# Patient Record
Sex: MALE | Race: WHITE | NOT HISPANIC OR LATINO | URBAN - METROPOLITAN AREA
[De-identification: names, ages, dates, MRNs, and addresses within clinical notes are randomized per-mention and may not be internally consistent; named-entity substitution may affect disease eponyms.]

---

## 2019-04-30 ENCOUNTER — INPATIENT (INPATIENT)
Facility: HOSPITAL | Age: 74
LOS: 3 days | Discharge: HOME | End: 2019-05-04
Attending: HOSPITALIST | Admitting: HOSPITALIST
Payer: COMMERCIAL

## 2019-04-30 VITALS
DIASTOLIC BLOOD PRESSURE: 60 MMHG | TEMPERATURE: 100 F | RESPIRATION RATE: 18 BRPM | OXYGEN SATURATION: 99 % | HEART RATE: 78 BPM | SYSTOLIC BLOOD PRESSURE: 148 MMHG

## 2019-04-30 DIAGNOSIS — I96 GANGRENE, NOT ELSEWHERE CLASSIFIED: ICD-10-CM

## 2019-04-30 LAB
ALBUMIN SERPL ELPH-MCNC: 4.1 G/DL — SIGNIFICANT CHANGE UP (ref 3.3–5)
ALP SERPL-CCNC: 60 U/L — SIGNIFICANT CHANGE UP (ref 40–120)
ALT FLD-CCNC: 32 U/L — SIGNIFICANT CHANGE UP (ref 4–41)
ANION GAP SERPL CALC-SCNC: 14 MMO/L — SIGNIFICANT CHANGE UP (ref 7–14)
APTT BLD: 29.9 SEC — SIGNIFICANT CHANGE UP (ref 27.5–36.3)
AST SERPL-CCNC: 25 U/L — SIGNIFICANT CHANGE UP (ref 4–40)
BASE EXCESS BLDV CALC-SCNC: 4.3 MMOL/L — SIGNIFICANT CHANGE UP
BASOPHILS # BLD AUTO: 0.03 K/UL — SIGNIFICANT CHANGE UP (ref 0–0.2)
BASOPHILS NFR BLD AUTO: 0.4 % — SIGNIFICANT CHANGE UP (ref 0–2)
BILIRUB SERPL-MCNC: 0.6 MG/DL — SIGNIFICANT CHANGE UP (ref 0.2–1.2)
BLD GP AB SCN SERPL QL: NEGATIVE — SIGNIFICANT CHANGE UP
BLOOD GAS VENOUS - CREATININE: 0.7 MG/DL — SIGNIFICANT CHANGE UP (ref 0.5–1.3)
BUN SERPL-MCNC: 15 MG/DL — SIGNIFICANT CHANGE UP (ref 7–23)
CALCIUM SERPL-MCNC: 9.4 MG/DL — SIGNIFICANT CHANGE UP (ref 8.4–10.5)
CHLORIDE BLDV-SCNC: 103 MMOL/L — SIGNIFICANT CHANGE UP (ref 96–108)
CHLORIDE SERPL-SCNC: 99 MMOL/L — SIGNIFICANT CHANGE UP (ref 98–107)
CO2 SERPL-SCNC: 25 MMOL/L — SIGNIFICANT CHANGE UP (ref 22–31)
CREAT SERPL-MCNC: 0.83 MG/DL — SIGNIFICANT CHANGE UP (ref 0.5–1.3)
EOSINOPHIL # BLD AUTO: 0.03 K/UL — SIGNIFICANT CHANGE UP (ref 0–0.5)
EOSINOPHIL NFR BLD AUTO: 0.4 % — SIGNIFICANT CHANGE UP (ref 0–6)
ERYTHROCYTE [SEDIMENTATION RATE] IN BLOOD: 29 MM/HR — HIGH (ref 1–15)
GAS PNL BLDV: 137 MMOL/L — SIGNIFICANT CHANGE UP (ref 136–146)
GLUCOSE BLDV-MCNC: 210 — HIGH (ref 70–99)
GLUCOSE SERPL-MCNC: 221 MG/DL — HIGH (ref 70–99)
GRAM STN SPEC: SIGNIFICANT CHANGE UP
HCO3 BLDV-SCNC: 26 MMOL/L — SIGNIFICANT CHANGE UP (ref 20–27)
HCT VFR BLD CALC: 40.4 % — SIGNIFICANT CHANGE UP (ref 39–50)
HCT VFR BLDV CALC: 42.7 % — SIGNIFICANT CHANGE UP (ref 39–51)
HGB BLD-MCNC: 13.9 G/DL — SIGNIFICANT CHANGE UP (ref 13–17)
HGB BLDV-MCNC: 13.9 G/DL — SIGNIFICANT CHANGE UP (ref 13–17)
IMM GRANULOCYTES NFR BLD AUTO: 0.8 % — SIGNIFICANT CHANGE UP (ref 0–1.5)
INR BLD: 1.04 — SIGNIFICANT CHANGE UP (ref 0.88–1.17)
LACTATE BLDV-MCNC: 1.9 MMOL/L — SIGNIFICANT CHANGE UP (ref 0.5–2)
LYMPHOCYTES # BLD AUTO: 1.6 K/UL — SIGNIFICANT CHANGE UP (ref 1–3.3)
LYMPHOCYTES # BLD AUTO: 20 % — SIGNIFICANT CHANGE UP (ref 13–44)
MCHC RBC-ENTMCNC: 31.2 PG — SIGNIFICANT CHANGE UP (ref 27–34)
MCHC RBC-ENTMCNC: 34.4 % — SIGNIFICANT CHANGE UP (ref 32–36)
MCV RBC AUTO: 90.6 FL — SIGNIFICANT CHANGE UP (ref 80–100)
MONOCYTES # BLD AUTO: 0.77 K/UL — SIGNIFICANT CHANGE UP (ref 0–0.9)
MONOCYTES NFR BLD AUTO: 9.6 % — SIGNIFICANT CHANGE UP (ref 2–14)
NEUTROPHILS # BLD AUTO: 5.5 K/UL — SIGNIFICANT CHANGE UP (ref 1.8–7.4)
NEUTROPHILS NFR BLD AUTO: 68.8 % — SIGNIFICANT CHANGE UP (ref 43–77)
NRBC # FLD: 0.02 K/UL — SIGNIFICANT CHANGE UP (ref 0–0)
PCO2 BLDV: 50 MMHG — SIGNIFICANT CHANGE UP (ref 41–51)
PH BLDV: 7.39 PH — SIGNIFICANT CHANGE UP (ref 7.32–7.43)
PLATELET # BLD AUTO: 164 K/UL — SIGNIFICANT CHANGE UP (ref 150–400)
PMV BLD: 11 FL — SIGNIFICANT CHANGE UP (ref 7–13)
PO2 BLDV: 23 MMHG — LOW (ref 35–40)
POTASSIUM BLDV-SCNC: 3.5 MMOL/L — SIGNIFICANT CHANGE UP (ref 3.4–4.5)
POTASSIUM SERPL-MCNC: 3.7 MMOL/L — SIGNIFICANT CHANGE UP (ref 3.5–5.3)
POTASSIUM SERPL-SCNC: 3.7 MMOL/L — SIGNIFICANT CHANGE UP (ref 3.5–5.3)
PROT SERPL-MCNC: 7.8 G/DL — SIGNIFICANT CHANGE UP (ref 6–8.3)
PROTHROM AB SERPL-ACNC: 11.6 SEC — SIGNIFICANT CHANGE UP (ref 9.8–13.1)
RBC # BLD: 4.46 M/UL — SIGNIFICANT CHANGE UP (ref 4.2–5.8)
RBC # FLD: 12.6 % — SIGNIFICANT CHANGE UP (ref 10.3–14.5)
RH IG SCN BLD-IMP: POSITIVE — SIGNIFICANT CHANGE UP
SAO2 % BLDV: 38.6 % — LOW (ref 60–85)
SODIUM SERPL-SCNC: 138 MMOL/L — SIGNIFICANT CHANGE UP (ref 135–145)
SPECIMEN SOURCE: SIGNIFICANT CHANGE UP
WBC # BLD: 7.99 K/UL — SIGNIFICANT CHANGE UP (ref 3.8–10.5)
WBC # FLD AUTO: 7.99 K/UL — SIGNIFICANT CHANGE UP (ref 3.8–10.5)

## 2019-04-30 PROCEDURE — 71045 X-RAY EXAM CHEST 1 VIEW: CPT | Mod: 26

## 2019-04-30 PROCEDURE — 99223 1ST HOSP IP/OBS HIGH 75: CPT | Mod: GC

## 2019-04-30 PROCEDURE — 73630 X-RAY EXAM OF FOOT: CPT | Mod: 26,LT

## 2019-04-30 RX ORDER — VANCOMYCIN HCL 1 G
1000 VIAL (EA) INTRAVENOUS ONCE
Qty: 0 | Refills: 0 | Status: COMPLETED | OUTPATIENT
Start: 2019-04-30 | End: 2019-04-30

## 2019-04-30 RX ORDER — SODIUM CHLORIDE 9 MG/ML
1000 INJECTION, SOLUTION INTRAVENOUS ONCE
Qty: 0 | Refills: 0 | Status: COMPLETED | OUTPATIENT
Start: 2019-04-30 | End: 2019-04-30

## 2019-04-30 RX ORDER — PIPERACILLIN AND TAZOBACTAM 4; .5 G/20ML; G/20ML
3.38 INJECTION, POWDER, LYOPHILIZED, FOR SOLUTION INTRAVENOUS ONCE
Qty: 0 | Refills: 0 | Status: COMPLETED | OUTPATIENT
Start: 2019-04-30 | End: 2019-04-30

## 2019-04-30 RX ADMIN — Medication 250 MILLIGRAM(S): at 21:26

## 2019-04-30 RX ADMIN — PIPERACILLIN AND TAZOBACTAM 200 GRAM(S): 4; .5 INJECTION, POWDER, LYOPHILIZED, FOR SOLUTION INTRAVENOUS at 20:24

## 2019-04-30 RX ADMIN — SODIUM CHLORIDE 1000 MILLILITER(S): 9 INJECTION, SOLUTION INTRAVENOUS at 20:24

## 2019-04-30 NOTE — H&P ADULT - NEGATIVE CARDIOVASCULAR SYMPTOMS
no chest pain/no claudication/no peripheral edema/no palpitations/no dyspnea on exertion/no orthopnea/no paroxysmal nocturnal dyspnea

## 2019-04-30 NOTE — H&P ADULT - NSHPLABSRESULTS_GEN_ALL_CORE
LABS:                        13.9   7.99  )-----------( 164      ( 30 Apr 2019 20:18 )             40.4     04-30    138  |  99  |  15  ----------------------------<  221<H>  3.7   |  25  |  0.83    Ca    9.4      30 Apr 2019 20:18    TPro  7.8  /  Alb  4.1  /  TBili  0.6  /  DBili  x   /  AST  25  /  ALT  32  /  AlkPhos  60  04-30    PT/INR - ( 30 Apr 2019 20:18 )   PT: 11.6 SEC;   INR: 1.04          PTT - ( 30 Apr 2019 20:18 )  PTT:29.9 SEC    RADIOLOGY & ADDITIONAL TESTS:  INTERPRETA    < end of copied text >  Xray L foot:  Osteomyelitis of the left distal 2nd phalanx.  Imaging Personally Reviewed:  Yes LABS:                        13.9   7.99  )-----------( 164      ( 30 Apr 2019 20:18 )             40.4     04-30    138  |  99  |  15  ----------------------------<  221<H>  3.7   |  25  |  0.83    Ca    9.4      30 Apr 2019 20:18    TPro  7.8  /  Alb  4.1  /  TBili  0.6  /  DBili  x   /  AST  25  /  ALT  32  /  AlkPhos  60  04-30    PT/INR - ( 30 Apr 2019 20:18 )   PT: 11.6 SEC;   INR: 1.04          PTT - ( 30 Apr 2019 20:18 )  PTT:29.9 SEC    EKG: NSR 73, RBBB, TWI II, III, aVF, V1-V6    RADIOLOGY & ADDITIONAL TESTS:  INTERPRETA    < end of copied text >  Xray L foot:  Osteomyelitis of the left distal 2nd phalanx.  Imaging Personally Reviewed:  Yes

## 2019-04-30 NOTE — ED PROVIDER NOTE - OBJECTIVE STATEMENT
73 y/o male 73 y/o male pmh cardiac stents, bypass surgey, was sent in by his podiatrist Dr. Nicholson, had an injury to second left toe 2 weeks ago when cutting h is toe nail, developed an infection, was seen at an urgent care, given PO oral abx, however when he went to dr nicholson today his entire second toe was erythematous, tender and infected, + has an area of cellulitis on ant chin [facility] down 3 cm x 4 cm, denies any headaches, neck pain, cough, f/c/n/v/d, chest pain, son, abdominal pain, urinary symptoms, numbness/weakness/tingling, recent travel, sick contact, social history.  sent pt in for admission for osteo, podiatry/vascular consult

## 2019-04-30 NOTE — ED ADULT TRIAGE NOTE - CHIEF COMPLAINT QUOTE
pt sent from podiatrist for left 2nd toe infection, as per rx from podiatry r/o osteo and requires IV abx. Denies DM, low grade temp in triage

## 2019-04-30 NOTE — H&P ADULT - PROBLEM SELECTOR PLAN 1
-OM found on L 2nd toe, ESR 29, gram + cocci on wound cx  -f/u CRP, BCx2  -c/w broad spec abx w/ vanco and zosyn and downgrade afterwards  -would need possible bone culture and OR management  -f/u podiatry recs  -cardio clearance? METS 4 and may not be necessary for low risk surgery -OM found on L 2nd toe, ESR 29, gram + cocci on wound cx  -f/u CRP, BCx2  -c/w broad spec abx w/ vanco and zosyn and downgrade afterwards  -would need possible bone culture and OR management  -f/u podiatry recs  -cardio clearance? METS 4 and may not be necessary for low risk surgery  -f/u LILIA/PVR and if significant may need vascular surgery consult -OM found on L 2nd toe w/ overlying leg cellulitis, ESR 29, gram + cocci on wound cx  -f/u CRP, BCx2  -c/w broad spec abx w/ vanco and zosyn and downgrade afterwards  -would need possible bone culture and OR management  -f/u podiatry recs  -cardio clearance? METS 4 and may not be necessary for low risk surgery  -f/u LILIA/PVR and if significant may need vascular surgery consult -OM found on L 2nd toe w/ overlying leg cellulitis, ESR 29, gram + cocci on wound cx  -f/u CRP, BCx2  -c/w broad spec abx w/ vanco and zosyn and downgrade afterwards  -would need possible bone culture and OR management  -f/u further podiatry recs  -f/u LILIA/PVR and if significant may need vascular surgery consult

## 2019-04-30 NOTE — H&P ADULT - NSHPPHYSICALEXAM_GEN_ALL_CORE
General: NAD, well nourished, appears stated age  HEENT:  Head: NT, AT  Eyes: EOMI, scleara non-icteric, conjunctiva clear, PERRLA, visual fields full to confrontration   Ears: hearing intact b/l  Nose: no discharge, obstruction, non-deviated  Mouth: no exudates, injected in pharynx, uvula midlines   Neck: Supple, No JVD, no carotid bruits no lymphadenopathy, no thyroidomegaly  Cardiac: RRR, S1 S2, No M/R/G, PMI in 5th IC  Pulmonary: CTA b/l, Breathing unlabored, No Rhonchi/Rales/Wheezing, diaphragm moves symmetrically b/l  Abdomen: Soft, Non -tender, +BS x 4 quads, no hepatomegaly or splenomegaly  Back: straight, no step-offs, no kyphosis, no CVA  Extremities: Warm, nontender, No Rashes, lesions, bruises, No pitting edema, venous stasis  Neuro: AO x 3, No focal deficits, CNII-CNXII grossly intact, Motor: strength 5/5 b/l UE and LE. Sensory: touch and pinprick sensation intact b/l. Cerebellar: no dysmetria, no tremor  Reflexes: 2+ b/l in LE and UE, no Babinski sign b/l General: NAD, well nourished, appears stated age  HEENT:  Head: NT, AT  Eyes: EOMI, scleara non-icteric  Neck: Supple, No JVD  Cardiac: RRR, S1 S2, No M/R/G  Pulmonary: CTA b/l, Breathing unlabored, No Rhonchi/Rales/Wheezing  Abdomen: Soft, Non -tender, +BS x 4 quads  Extremities: LLE erythematous up to his knees, nontender, warm to touch compared to R leg, wrapped in gauze  Palpable DP +2 on R and unable to feel DP on L, PT +1 on L and 2+ on R  Neuro: AO x 3, No focal deficits, CNII-CNXII grossly intact, Motor: strength 5/5 b/l UE and LE. Sensory: sensation to touch intact b/l LE General: NAD, well nourished, appears stated age  HEENT:  Head: NT, AT  Eyes: EOMI, scleara non-icteric  Neck: Supple, No JVD  Cardiac: RRR, S1 S2, No M/R/G  Pulmonary: CTA b/l, Breathing unlabored, No Rhonchi/Rales/Wheezing  Abdomen: Soft, Non -tender, +BS x 4 quads  Extremities: LLE erythematous up to his knees, nontender, nonpurulent, warm to touch compared to R leg, wrapped in gauze  Palpable DP +2 on R and unable to feel DP on L, PT +1 on L and 2+ on R  Neuro: AO x 3, No focal deficits, CNII-CNXII grossly intact, Motor: strength 5/5 b/l UE and LE. Sensory: sensation to touch intact b/l LE

## 2019-04-30 NOTE — H&P ADULT - NSHPOUTPATIENTPROVIDERS_GEN_ALL_CORE
Podiatry: Dr. Ferguson  Endo: Dr. Morgan Podiatry: Dr. Ferguson  Cardiologist: Dr. Toni Lopez ([place])   Endo: Dr. Morgan

## 2019-04-30 NOTE — ED ADULT NURSE NOTE - OBJECTIVE STATEMENT
Pt aa&ox3 PMH DM presents from podiatrist office for infection to second toe on left foot. Pt denies fevers/pain.  Podiatry at bedside. 20g IV place in RT AC, labs sent. Will monitor.

## 2019-04-30 NOTE — CONSULT NOTE ADULT - SUBJECTIVE AND OBJECTIVE BOX
Patient is a 74y old  Male who presents with a chief complaint of     HPI: 73 y/o male pmh cardiac stents, bypass surgey, was sent in by his podiatrist Dr. Nicholson, had an injury to second left toe 2 weeks ago when cutting h is toe nail, developed an infection, was seen at an urgent care, given PO oral abx, however when he went to dr nicholson today his entire second toe was erythematous, tender and infected, + has an area of cellulitis on ant chin nursing home down 3 cm x 4 cm, denies any headaches, neck pain, cough, f/c/n/v/d, chest pain, son, abdominal pain, urinary symptoms, numbness/weakness/tingling, recent travel, sick contact, social history.  sent pt in for admission for osteo, podiatry/vascular consult      PAST MEDICAL & SURGICAL HISTORY:  No pertinent past medical history      MEDICATIONS  (STANDING):  vancomycin  IVPB 1000 milliGRAM(s) IV Intermittent once    MEDICATIONS  (PRN):      Allergies    No Known Allergies    Intolerances        VITALS:    Vital Signs Last 24 Hrs  T(C): 36.6 (30 Apr 2019 18:28), Max: 37.8 (30 Apr 2019 16:37)  T(F): 97.9 (30 Apr 2019 18:28), Max: 100 (30 Apr 2019 16:37)  HR: 79 (30 Apr 2019 18:28) (78 - 79)  BP: 159/78 (30 Apr 2019 18:28) (148/60 - 159/78)  BP(mean): --  RR: 18 (30 Apr 2019 18:28) (18 - 18)  SpO2: 99% (30 Apr 2019 18:28) (99% - 99%)    LABS:                CAPILLARY BLOOD GLUCOSE              LOWER EXTREMITY PHYSICAL EXAM:    Vascular: DP 2/4 PT 0/4, B/L, CFT <3 seconds B/L, Temperature gradient increased to LLE, B/L.   Neuro: Epicritic sensation diminished to the level of toes, B/L.  Skin: erythema local to toe only, proximally noted to be some swelling at distal calf although with disconnect from digit as source possibly separate cellulitis, nonpitting edema, positive probe to bone of 2nd toe ulceration left foot  Wound #1:   Location: 2nd toe distal tipe  Size: .3cm x .3cm  Depth: bone  Wound bed: fibrous   Drainage: purulence minimal  Odor: none   Periwound: fibrous/hyperkeratotic  Etiology: neuropathic? not sensate no hx of DM    RADIOLOGY & ADDITIONAL STUDIES:

## 2019-04-30 NOTE — H&P ADULT - ASSESSMENT
75 y/o M PMH osteoporesis, hyperparathyroidism, CAD s/p cardiac stents and CABG p/w 2nd L toe OM 73 y/o M PMH CAD s/p 4 stents (2009) that failed and subsequent CABG (2009) was sent in from his podiatrist's office for 2nd L toe and found OM 75 y/o M PMH CAD s/p 4 stents (2009) that failed and subsequent CABG (2009) was sent in from his podiatrist's office for 2nd L toe infection and now found to have OM

## 2019-04-30 NOTE — H&P ADULT - HISTORY OF PRESENT ILLNESS
73 y/o M PMH osteoporesis, hyperparathyroidism, CAD s/p cardiac stents and CABG was sent in from his podiatrist's office for 2nd L toe that was injuried after cutting his toe nail and it became infected. Was given a PO abx at an urgent care center and when he went to Dr. Ferguson today was found to have an erythematous and tender toe concerning for cellulitis or osteomyelitis. No f/n/v/d/CP/SOB/abd pain/dysuria, sick contacts or recent travels. 73 y/o M PMH osteoporesis, hyperparathyroidism, CAD s/p cardiac stents and CABG was sent in from his podiatrist's office for 2nd L toe that was injuried after cutting his toe nail and it became infected. Was given a PO abx at an urgent care center and when he went to Dr. Ferguson today was found to have an erythematous and tender toe concerning for cellulitis or osteomyelitis. No f/n/v/d/CP/SOB/abd pain/dysuria, sick contacts or recent travels.     Found on X Xray of L 2nd toe to have OM. 75 y/o M PMH CAD s/p 4 stents (200() that failed and subsequent CABG (2009) was sent in from his podiatrist's office for 2nd L toe that was injuried after cutting his toe nail 2 weeks ago. He was triming his nails and then cut himself and it started to bleed. He tried putting neosporin but it kept getting worse with erythema. He denied seeing any pus but it did become more tender and painful. He went to his podiatrist Dr. Ferguson today was found to have an erythematous and tender toe concerning for cellulitis or osteomyelitis. No f/n/v/d/CP/SOB/abd pain/dysuria, sick contacts and recently came back from [place]. He denies having DM but hasn't seen a PMD in years. Found on X Xray of L 2nd toe to have OM. Given in the ED vanco and zosyn and 1L LR. 75 y/o M PMH CAD s/p 4 stents (2009) that failed and subsequent CABG (2009) was sent in from his podiatrist's office for 2nd L toe that was injured after cutting his toe nail 2 weeks ago. He was triming his nails and then cut himself and it started to bleed. He tried putting neosporin but it kept getting worse with erythema. He denied seeing any pus but it did become more tender and painful. He went to his podiatrist Dr. Ferguson today was found to have an erythematous and tender toe concerning for cellulitis or osteomyelitis and told to go to ED. No f/n/v/d/CP/SOB/abd pain/dysuria, sick contacts and recently came back from [place]. He denies having DM but hasn't seen a PMD in years. Found on X Xray of L 2nd toe to have OM. Given in the ED vanco and zosyn and 1L LR.

## 2019-04-30 NOTE — H&P ADULT - PROBLEM SELECTOR PLAN 2
-f/u A1c, elevated hyperglycemia and undiagnosed DM possibly New diagnosis of DMII, per patient he never had it before or any meds  -c/w ISS and FS monitoring and adjust  -DM education especially w/ OM and CAD

## 2019-04-30 NOTE — H&P ADULT - PROBLEM SELECTOR PLAN 3
s/p 4 stents (that failed) and subsequent CABG in 2009  -was on home ASA  -hold ASA for now pending whether OR or not s/p 4 stents (that failed) and subsequent CABG in 2009  -was on home ASA  -c/w ASA for now s/p 4 stents (that failed) and subsequent CABG in 2009, EKG: NSR 73, RBBB, TWI II, III, aVF, V1-V6  -was on home ASA  -c/w ASA for now  -monitor for CP or SOB, EKG shows ischemia from before but unclear how much is baseline

## 2019-04-30 NOTE — ED ADULT NURSE NOTE - NSIMPLEMENTINTERV_GEN_ALL_ED
Implemented All Universal Safety Interventions:  [place] to call system. Call bell, personal items and telephone within reach. Instruct patient to call for assistance. Room bathroom lighting operational. Non-slip footwear when patient is off stretcher. Physically safe environment: no spills, clutter or unnecessary equipment. Stretcher in lowest position, wheels locked, appropriate side rails in place.

## 2019-04-30 NOTE — CONSULT NOTE ADULT - ASSESSMENT
73yo M w/ OM confirmed on outpatient XR of left foot 2nd toe distal phalanx  ·	Pt seen evaluated  ·	Culture taken of 2nd toe ulceration  ·	XR, labs pending, erythema focal to toe w/ separate erythema noted at distal leg (not noted to be connected)  ·	LILIA ordered per attending w/ pt not having palpable PT pulse, if abnormal would require vascular evaluation  ·	Continue IV abx on admission  ·	Will likely plan for OR/toe amputation during this admission, please document medical & likely cardiac optimization (pt has bypass/stenting hx) for light sedation/local anesthesia  ·	Wet dry dsd applied  ·	Will follow up appearance/improvement in AM & update medicine team on likely surgical plan  ·	d/w attending

## 2019-04-30 NOTE — H&P ADULT - NEGATIVE ENMT SYMPTOMS
no nasal congestion/no nasal obstruction/no sinus symptoms/no post-nasal discharge/no hearing difficulty

## 2019-04-30 NOTE — H&P ADULT - NSHPSOCIALHISTORY_GEN_ALL_CORE
Smoking, alcohol or IVDU, lives with, works as Former smoker, quit 2009 and used smoke 1ppd for 35 yrs, ETOH quit 27 years ago and used to drink 2-3 beers a day for 30 years, no IVDU, works as an

## 2019-05-01 DIAGNOSIS — Z01.818 ENCOUNTER FOR OTHER PREPROCEDURAL EXAMINATION: ICD-10-CM

## 2019-05-01 DIAGNOSIS — I25.10 ATHEROSCLEROTIC HEART DISEASE OF NATIVE CORONARY ARTERY WITHOUT ANGINA PECTORIS: ICD-10-CM

## 2019-05-01 DIAGNOSIS — Z29.9 ENCOUNTER FOR PROPHYLACTIC MEASURES, UNSPECIFIED: ICD-10-CM

## 2019-05-01 DIAGNOSIS — M86.9 OSTEOMYELITIS, UNSPECIFIED: ICD-10-CM

## 2019-05-01 DIAGNOSIS — E11.9 TYPE 2 DIABETES MELLITUS WITHOUT COMPLICATIONS: ICD-10-CM

## 2019-05-01 DIAGNOSIS — I10 ESSENTIAL (PRIMARY) HYPERTENSION: ICD-10-CM

## 2019-05-01 DIAGNOSIS — Z95.1 PRESENCE OF AORTOCORONARY BYPASS GRAFT: Chronic | ICD-10-CM

## 2019-05-01 DIAGNOSIS — R73.9 HYPERGLYCEMIA, UNSPECIFIED: ICD-10-CM

## 2019-05-01 LAB
ANION GAP SERPL CALC-SCNC: 12 MMO/L — SIGNIFICANT CHANGE UP (ref 7–14)
BASOPHILS # BLD AUTO: 0.04 K/UL — SIGNIFICANT CHANGE UP (ref 0–0.2)
BASOPHILS NFR BLD AUTO: 0.7 % — SIGNIFICANT CHANGE UP (ref 0–2)
BUN SERPL-MCNC: 11 MG/DL — SIGNIFICANT CHANGE UP (ref 7–23)
CALCIUM SERPL-MCNC: 8.7 MG/DL — SIGNIFICANT CHANGE UP (ref 8.4–10.5)
CHLORIDE SERPL-SCNC: 102 MMOL/L — SIGNIFICANT CHANGE UP (ref 98–107)
CO2 SERPL-SCNC: 25 MMOL/L — SIGNIFICANT CHANGE UP (ref 22–31)
CREAT SERPL-MCNC: 0.77 MG/DL — SIGNIFICANT CHANGE UP (ref 0.5–1.3)
EOSINOPHIL # BLD AUTO: 0.05 K/UL — SIGNIFICANT CHANGE UP (ref 0–0.5)
EOSINOPHIL NFR BLD AUTO: 0.8 % — SIGNIFICANT CHANGE UP (ref 0–6)
GLUCOSE SERPL-MCNC: 223 MG/DL — HIGH (ref 70–99)
HBA1C BLD-MCNC: 9.1 % — HIGH (ref 4–5.6)
HCT VFR BLD CALC: 39 % — SIGNIFICANT CHANGE UP (ref 39–50)
HCV AB S/CO SERPL IA: 0.21 S/CO — SIGNIFICANT CHANGE UP (ref 0–0.99)
HCV AB SERPL-IMP: SIGNIFICANT CHANGE UP
HGB BLD-MCNC: 13.5 G/DL — SIGNIFICANT CHANGE UP (ref 13–17)
IMM GRANULOCYTES NFR BLD AUTO: 0.5 % — SIGNIFICANT CHANGE UP (ref 0–1.5)
LYMPHOCYTES # BLD AUTO: 1.42 K/UL — SIGNIFICANT CHANGE UP (ref 1–3.3)
LYMPHOCYTES # BLD AUTO: 23.8 % — SIGNIFICANT CHANGE UP (ref 13–44)
MAGNESIUM SERPL-MCNC: 1.9 MG/DL — SIGNIFICANT CHANGE UP (ref 1.6–2.6)
MCHC RBC-ENTMCNC: 31.8 PG — SIGNIFICANT CHANGE UP (ref 27–34)
MCHC RBC-ENTMCNC: 34.6 % — SIGNIFICANT CHANGE UP (ref 32–36)
MCV RBC AUTO: 92 FL — SIGNIFICANT CHANGE UP (ref 80–100)
MONOCYTES # BLD AUTO: 0.67 K/UL — SIGNIFICANT CHANGE UP (ref 0–0.9)
MONOCYTES NFR BLD AUTO: 11.2 % — SIGNIFICANT CHANGE UP (ref 2–14)
NEUTROPHILS # BLD AUTO: 3.76 K/UL — SIGNIFICANT CHANGE UP (ref 1.8–7.4)
NEUTROPHILS NFR BLD AUTO: 63 % — SIGNIFICANT CHANGE UP (ref 43–77)
NRBC # FLD: 0 K/UL — SIGNIFICANT CHANGE UP (ref 0–0)
NT-PROBNP SERPL-SCNC: 115.3 PG/ML — SIGNIFICANT CHANGE UP
PHOSPHATE SERPL-MCNC: 2.6 MG/DL — SIGNIFICANT CHANGE UP (ref 2.5–4.5)
PLATELET # BLD AUTO: 145 K/UL — LOW (ref 150–400)
PMV BLD: 10.6 FL — SIGNIFICANT CHANGE UP (ref 7–13)
POTASSIUM SERPL-MCNC: 3.7 MMOL/L — SIGNIFICANT CHANGE UP (ref 3.5–5.3)
POTASSIUM SERPL-SCNC: 3.7 MMOL/L — SIGNIFICANT CHANGE UP (ref 3.5–5.3)
RBC # BLD: 4.24 M/UL — SIGNIFICANT CHANGE UP (ref 4.2–5.8)
RBC # FLD: 12.6 % — SIGNIFICANT CHANGE UP (ref 10.3–14.5)
SODIUM SERPL-SCNC: 139 MMOL/L — SIGNIFICANT CHANGE UP (ref 135–145)
SPECIMEN SOURCE: SIGNIFICANT CHANGE UP
SPECIMEN SOURCE: SIGNIFICANT CHANGE UP
WBC # BLD: 5.97 K/UL — SIGNIFICANT CHANGE UP (ref 3.8–10.5)
WBC # FLD AUTO: 5.97 K/UL — SIGNIFICANT CHANGE UP (ref 3.8–10.5)

## 2019-05-01 PROCEDURE — 99233 SBSQ HOSP IP/OBS HIGH 50: CPT | Mod: GC

## 2019-05-01 PROCEDURE — 93923 UPR/LXTR ART STDY 3+ LVLS: CPT | Mod: 26

## 2019-05-01 RX ORDER — SODIUM CHLORIDE 9 MG/ML
1000 INJECTION, SOLUTION INTRAVENOUS
Qty: 0 | Refills: 0 | Status: DISCONTINUED | OUTPATIENT
Start: 2019-05-01 | End: 2019-05-04

## 2019-05-01 RX ORDER — INSULIN LISPRO 100/ML
VIAL (ML) SUBCUTANEOUS AT BEDTIME
Qty: 0 | Refills: 0 | Status: DISCONTINUED | OUTPATIENT
Start: 2019-05-01 | End: 2019-05-04

## 2019-05-01 RX ORDER — INSULIN LISPRO 100/ML
VIAL (ML) SUBCUTANEOUS
Qty: 0 | Refills: 0 | Status: DISCONTINUED | OUTPATIENT
Start: 2019-05-01 | End: 2019-05-04

## 2019-05-01 RX ORDER — VANCOMYCIN HCL 1 G
1500 VIAL (EA) INTRAVENOUS EVERY 12 HOURS
Qty: 0 | Refills: 0 | Status: DISCONTINUED | OUTPATIENT
Start: 2019-05-01 | End: 2019-05-04

## 2019-05-01 RX ORDER — HEPARIN SODIUM 5000 [USP'U]/ML
5000 INJECTION INTRAVENOUS; SUBCUTANEOUS EVERY 8 HOURS
Qty: 0 | Refills: 0 | Status: DISCONTINUED | OUTPATIENT
Start: 2019-05-01 | End: 2019-05-02

## 2019-05-01 RX ORDER — INSULIN LISPRO 100/ML
5 VIAL (ML) SUBCUTANEOUS
Qty: 0 | Refills: 0 | Status: DISCONTINUED | OUTPATIENT
Start: 2019-05-01 | End: 2019-05-04

## 2019-05-01 RX ORDER — DEXTROSE 50 % IN WATER 50 %
15 SYRINGE (ML) INTRAVENOUS ONCE
Qty: 0 | Refills: 0 | Status: DISCONTINUED | OUTPATIENT
Start: 2019-05-01 | End: 2019-05-04

## 2019-05-01 RX ORDER — DEXTROSE 50 % IN WATER 50 %
25 SYRINGE (ML) INTRAVENOUS ONCE
Qty: 0 | Refills: 0 | Status: DISCONTINUED | OUTPATIENT
Start: 2019-05-01 | End: 2019-05-04

## 2019-05-01 RX ORDER — ASPIRIN/CALCIUM CARB/MAGNESIUM 324 MG
81 TABLET ORAL DAILY
Qty: 0 | Refills: 0 | Status: DISCONTINUED | OUTPATIENT
Start: 2019-05-01 | End: 2019-05-04

## 2019-05-01 RX ORDER — ACETAMINOPHEN 500 MG
650 TABLET ORAL EVERY 6 HOURS
Qty: 0 | Refills: 0 | Status: DISCONTINUED | OUTPATIENT
Start: 2019-05-01 | End: 2019-05-04

## 2019-05-01 RX ORDER — ASPIRIN/CALCIUM CARB/MAGNESIUM 324 MG
1 TABLET ORAL
Qty: 0 | Refills: 0 | COMMUNITY

## 2019-05-01 RX ORDER — GLUCAGON INJECTION, SOLUTION 0.5 MG/.1ML
1 INJECTION, SOLUTION SUBCUTANEOUS ONCE
Qty: 0 | Refills: 0 | Status: DISCONTINUED | OUTPATIENT
Start: 2019-05-01 | End: 2019-05-04

## 2019-05-01 RX ORDER — DEXTROSE 50 % IN WATER 50 %
12.5 SYRINGE (ML) INTRAVENOUS ONCE
Qty: 0 | Refills: 0 | Status: DISCONTINUED | OUTPATIENT
Start: 2019-05-01 | End: 2019-05-04

## 2019-05-01 RX ORDER — PIPERACILLIN AND TAZOBACTAM 4; .5 G/20ML; G/20ML
3.38 INJECTION, POWDER, LYOPHILIZED, FOR SOLUTION INTRAVENOUS EVERY 8 HOURS
Qty: 0 | Refills: 0 | Status: DISCONTINUED | OUTPATIENT
Start: 2019-05-01 | End: 2019-05-04

## 2019-05-01 RX ORDER — METOPROLOL TARTRATE 50 MG
1 TABLET ORAL
Qty: 0 | Refills: 0 | COMMUNITY

## 2019-05-01 RX ORDER — INSULIN GLARGINE 100 [IU]/ML
15 INJECTION, SOLUTION SUBCUTANEOUS AT BEDTIME
Qty: 0 | Refills: 0 | Status: DISCONTINUED | OUTPATIENT
Start: 2019-05-01 | End: 2019-05-04

## 2019-05-01 RX ADMIN — INSULIN GLARGINE 15 UNIT(S): 100 INJECTION, SOLUTION SUBCUTANEOUS at 22:25

## 2019-05-01 RX ADMIN — Medication 5 UNIT(S): at 18:15

## 2019-05-01 RX ADMIN — PIPERACILLIN AND TAZOBACTAM 25 GRAM(S): 4; .5 INJECTION, POWDER, LYOPHILIZED, FOR SOLUTION INTRAVENOUS at 22:25

## 2019-05-01 RX ADMIN — PIPERACILLIN AND TAZOBACTAM 25 GRAM(S): 4; .5 INJECTION, POWDER, LYOPHILIZED, FOR SOLUTION INTRAVENOUS at 04:40

## 2019-05-01 RX ADMIN — HEPARIN SODIUM 5000 UNIT(S): 5000 INJECTION INTRAVENOUS; SUBCUTANEOUS at 22:24

## 2019-05-01 RX ADMIN — Medication 2: at 13:00

## 2019-05-01 RX ADMIN — Medication 300 MILLIGRAM(S): at 10:01

## 2019-05-01 RX ADMIN — PIPERACILLIN AND TAZOBACTAM 25 GRAM(S): 4; .5 INJECTION, POWDER, LYOPHILIZED, FOR SOLUTION INTRAVENOUS at 14:31

## 2019-05-01 RX ADMIN — Medication 2: at 09:16

## 2019-05-01 RX ADMIN — Medication 81 MILLIGRAM(S): at 12:59

## 2019-05-01 RX ADMIN — Medication 2: at 18:15

## 2019-05-01 RX ADMIN — HEPARIN SODIUM 5000 UNIT(S): 5000 INJECTION INTRAVENOUS; SUBCUTANEOUS at 14:33

## 2019-05-01 RX ADMIN — Medication 300 MILLIGRAM(S): at 21:45

## 2019-05-01 RX ADMIN — HEPARIN SODIUM 5000 UNIT(S): 5000 INJECTION INTRAVENOUS; SUBCUTANEOUS at 05:02

## 2019-05-01 NOTE — PROGRESS NOTE ADULT - PROBLEM SELECTOR PLAN 3
s/p 4 stents (that failed) and subsequent CABG in 2009, EKG: NSR 73, RBBB, TWI II, III, aVF, V1-V6  -was on home ASA  -c/w ASA for now  -monitor for CP or SOB, EKG shows ischemia from before but unclear how much is baseline  - will obtain records from pt's cardiologist, who he says he sees regularly every 4 or 5 months  - pending records available from outpt cardiologist, may pursue echo and further cardiac workup

## 2019-05-01 NOTE — PROGRESS NOTE ADULT - SUBJECTIVE AND OBJECTIVE BOX
Patient is a 74y old  Male who presents with a chief complaint of left 2nd toe wound (01 May 2019 08:53)     INTERVAL HPI/OVERNIGHT EVENTS: No acute events overnight. Says he feels well. No complaints.     REVIEW OF SYSTEMS:  Constitutional: Denies fever, weight loss, fatigue  Neuro: Denies HA, weakness, numbness  Resp: Denies SOB, cough, hemoptysis  CV: Denies chest pain, palpitations, dizziness  GI: Denies abdominal pain, N/V/D/C, melena, hematochezia  : Denies dysuria, increased frequency, hematuria  Skin: Denies rashes, lesions  MSK: Denies joint pain, swelling    MEDICATIONS  (STANDING):  aspirin  chewable 81 milliGRAM(s) Oral daily  dextrose 5%. 1000 milliLiter(s) (50 mL/Hr) IV Continuous <Continuous>  dextrose 50% Injectable 12.5 Gram(s) IV Push once  dextrose 50% Injectable 25 Gram(s) IV Push once  dextrose 50% Injectable 25 Gram(s) IV Push once  heparin  Injectable 5000 Unit(s) SubCutaneous every 8 hours  insulin lispro (HumaLOG) corrective regimen sliding scale   SubCutaneous three times a day before meals  insulin lispro (HumaLOG) corrective regimen sliding scale   SubCutaneous at bedtime  piperacillin/tazobactam IVPB. 3.375 Gram(s) IV Intermittent every 8 hours  vancomycin  IVPB 1500 milliGRAM(s) IV Intermittent every 12 hours    MEDICATIONS  (PRN):  acetaminophen   Tablet .. 650 milliGRAM(s) Oral every 6 hours PRN Temp greater or equal to 38C (100.4F), Mild Pain (1 - 3), Moderate Pain (4 - 6)  dextrose 40% Gel 15 Gram(s) Oral once PRN Blood Glucose LESS THAN 70 milliGRAM(s)/deciliter  glucagon  Injectable 1 milliGRAM(s) IntraMuscular once PRN Glucose LESS THAN 70 milligrams/deciliter    Allergies:  No Known Allergies      VITAL SIGNS:  T(C): 37.1 (05-01-19 @ 06:08), Max: 37.8 (04-30-19 @ 16:37)  HR: 63 (05-01-19 @ 06:08) (58 - 79)  BP: 141/67 (05-01-19 @ 06:08) (141/67 - 159/78)  RR: 19 (05-01-19 @ 06:08) (17 - 19)  SpO2: 100% (05-01-19 @ 06:08) (98% - 100%)    PHYSICAL EXAM:  General: NAD  Neuro: AAOx3. No FND.  HEENT: Normocephalic, atraumatic. EOMI. PERRL. Conjunctiva and sclera clear. Mucous membranes moist, without lesions. Neck supple. No JVD.  Resp: Non-labored breathing. Clear to auscultation bilaterally; no rales, rhonchi, or wheezing  Cardiovascular: Regular rate and rhythm; no murmurs, rubs, or gallops.   Abd: +BS, soft, nontender, obese but nondistended  Ext:  L foot wrapped in bandage, mild serosanguinous / purulence drainage from left second toe, L foot erythematous. No LE edema  Skin: Warm, dry    LABS:                        13.5   5.97  )-----------( 145      ( 01 May 2019 06:10 )             39.0     01 May 2019 06:10    139    |  102    |  11     ----------------------------<  223    3.7     |  25     |  0.77     Ca    8.7        01 May 2019 06:10  Phos  2.6       01 May 2019 06:10  Mg     1.9       01 May 2019 06:10    TPro  7.8    /  Alb  4.1    /  TBili  0.6    /  DBili  x      /  AST  25     /  ALT  32     /  AlkPhos  60     30 Apr 2019 20:18  PT/INR - ( 30 Apr 2019 20:18 )   PT: 11.6 SEC;   INR: 1.04          PTT - ( 30 Apr 2019 20:18 )  PTT:29.9 SEC  CAPILLARY BLOOD GLUCOSE      POCT Blood Glucose.: 217 mg/dL (01 May 2019 09:10)    BLOOD CULTURE    Culture - Abscess with Gram Stain (collected 30 Apr 2019 20:41)  Source: OTHER        RADIOLOGY & ADDITIONAL TESTS:      EXAM:  RAD FOOT MIN 3 VIEWS LT      PROCEDURE DATE:  Apr 30 2019     INTERPRETATION:  CLINICAL INDICATION: Left second toe injury. Evaluate   for osteomyelitis.    TECHNIQUE: 3 views of the left foot.    COMPARISON: None.    IMPRESSION:    Significant soft tissue swelling about the distal second digit. Cortical   osseous destruction of the distal second phalanx consistent with   osteomyelitis. No subcutaneous emphysema.    No acute fracture or dislocation. Joint spaces are maintained. Calcaneal   enthesophyte formation. Mild, diffuse soft tissue swelling about the   foot. Vascular calcifications. There are surgical clips within the distal   leg.        Consultant(s) Notes Reviewed:  yes    ----------------------------------------  CONTACT: Raquel Collins  Internal Medicine, PGY-1  [facility] Pager: [phone]  [facility] Pager [phone]  -----------------------------------------

## 2019-05-01 NOTE — PROGRESS NOTE ADULT - PROBLEM SELECTOR PLAN 2
New diagnosis of DMII, per patient he never had it before or any meds  -c/w ISS and FS monitoring and adjust  -DM education especially w/ OM and CAD New diagnosis of DMII, per patient he never had it before or any meds  -c/w ISS and FS monitoring and adjust  - likely will need to be on insulin as outpatient - patient had been on short course of insulin in the past.  -DM education especially w/ OM and CAD

## 2019-05-01 NOTE — PROGRESS NOTE ADULT - ASSESSMENT
73yo M w/ OM confirmed on outpatient XR of left foot 2nd toe distal phalanx  ·	Pt seen evaluated  ·	f/u culture  ·	f/u LILIA  ·	Continue IV abx on admission  ·	Will likely plan for OR/toe amputation during this admission, please document medical & likely cardiac optimization (pt has bypass/stenting hx) for light sedation/local anesthesia  ·	Wet dry dsd applied  ·	d/w attending 73yo M w/ OM confirmed on outpatient XR of left foot 2nd toe distal phalanx  ·	Pt seen evaluated  ·	f/u culture  ·	f/u LILIA  ·	Continue IV abx on admission  ·	plan for OR/toe amputation on Friday 5/3, please document medical & likely cardiac optimization (pt has bypass/stenting hx) for light sedation/local anesthesia  ·	1 cc pus expressed from left 2nd toe wound. WBC 5.97, VSS   ·	Wet dry dsd applied  ·	d/w attending

## 2019-05-01 NOTE — PROGRESS NOTE ADULT - PROBLEM SELECTOR PLAN 6
Patient states that he had TTE done last week.  Awaiting to obtain old TTE report from primary cardiologist.  Patient not exhibiting any ACS, CHF symptoms.  RCRI score of 2. Good functional status at baseline.  Review EKG.  Continue perioperative beta-blocker.  Will re-assess after obtaining old cardiac work up result.

## 2019-05-01 NOTE — PROGRESS NOTE ADULT - ASSESSMENT
75 y/o M PMH CAD s/p 4 stents (2009) that failed and subsequent CABG (2009), was sent in from his podiatrist's office for 2nd L toe infection and now found to have OM. Planning for amputation on 5/3.

## 2019-05-01 NOTE — ADVANCED PRACTICE NURSE CONSULT - REASON FOR CONSULT
Patient appropriately being seeing by Podiatry for left second toe infection with OM. Podiatry will follow up.

## 2019-05-01 NOTE — PROGRESS NOTE ADULT - SUBJECTIVE AND OBJECTIVE BOX
Patient is a 74y old  Male who presents with a chief complaint of 2nd toe wound    INTERVAL HPI/OVERNIGHT EVENTS:  Patient seen and evaluated at bedside.  Pt is resting comfortable in NAD. Denies N/V/F/C.     Allergies    No Known Allergies    Intolerances        Vital Signs Last 24 Hrs  T(C): 37.1 (01 May 2019 06:08), Max: 37.8 (30 Apr 2019 16:37)  T(F): 98.7 (01 May 2019 06:08), Max: 100 (30 Apr 2019 16:37)  HR: 63 (01 May 2019 06:08) (58 - 79)  BP: 141/67 (01 May 2019 06:08) (141/67 - 159/78)  BP(mean): --  RR: 19 (01 May 2019 06:08) (17 - 19)  SpO2: 100% (01 May 2019 06:08) (98% - 100%)    LABS:                        13.5   5.97  )-----------( 145      ( 01 May 2019 06:10 )             39.0     05-01    139  |  102  |  11  ----------------------------<  223<H>  3.7   |  25  |  0.77    Ca    8.7      01 May 2019 06:10  Phos  2.6     05-01  Mg     1.9     05-01    TPro  7.8  /  Alb  4.1  /  TBili  0.6  /  DBili  x   /  AST  25  /  ALT  32  /  AlkPhos  60  04-30    PT/INR - ( 30 Apr 2019 20:18 )   PT: 11.6 SEC;   INR: 1.04          PTT - ( 30 Apr 2019 20:18 )  PTT:29.9 SEC    CAPILLARY BLOOD GLUCOSE          Lower Extremity Physical Exam:  Vascular: DP 2/4 PT 0/4, B/L, CFT <3 seconds B/L, Temperature gradient increased to LLE, B/L.   Neuro: Epicritic sensation diminished to the level of toes, B/L.  Skin: erythema local to toe only, proximally noted to be some swelling at distal calf although with disconnect from digit as source possibly separate cellulitis, nonpitting edema, positive probe to bone of 2nd toe ulceration left foot  Wound #1:   Location: 2nd toe distal tipe  Size: .3cm x .3cm  Depth: bone  Wound bed: fibrous   Drainage: scant purulence  Odor: none   Periwound: fibrous/hyperkeratotic  Etiology: neuropathic? not sensate no hx of DM    RADIOLOGY & ADDITIONAL TESTS:

## 2019-05-02 ENCOUNTER — TRANSCRIPTION ENCOUNTER (OUTPATIENT)
Age: 74
End: 2019-05-02

## 2019-05-02 LAB
ALBUMIN SERPL ELPH-MCNC: 3.5 G/DL — SIGNIFICANT CHANGE UP (ref 3.3–5)
ALP SERPL-CCNC: 53 U/L — SIGNIFICANT CHANGE UP (ref 40–120)
ALT FLD-CCNC: 27 U/L — SIGNIFICANT CHANGE UP (ref 4–41)
ANION GAP SERPL CALC-SCNC: 11 MMO/L — SIGNIFICANT CHANGE UP (ref 7–14)
AST SERPL-CCNC: 23 U/L — SIGNIFICANT CHANGE UP (ref 4–40)
BASOPHILS # BLD AUTO: 0.04 K/UL — SIGNIFICANT CHANGE UP (ref 0–0.2)
BASOPHILS NFR BLD AUTO: 0.8 % — SIGNIFICANT CHANGE UP (ref 0–2)
BILIRUB SERPL-MCNC: 0.6 MG/DL — SIGNIFICANT CHANGE UP (ref 0.2–1.2)
BUN SERPL-MCNC: 13 MG/DL — SIGNIFICANT CHANGE UP (ref 7–23)
CALCIUM SERPL-MCNC: 8.8 MG/DL — SIGNIFICANT CHANGE UP (ref 8.4–10.5)
CHLORIDE SERPL-SCNC: 102 MMOL/L — SIGNIFICANT CHANGE UP (ref 98–107)
CO2 SERPL-SCNC: 27 MMOL/L — SIGNIFICANT CHANGE UP (ref 22–31)
CREAT SERPL-MCNC: 0.98 MG/DL — SIGNIFICANT CHANGE UP (ref 0.5–1.3)
EOSINOPHIL # BLD AUTO: 0.04 K/UL — SIGNIFICANT CHANGE UP (ref 0–0.5)
EOSINOPHIL NFR BLD AUTO: 0.8 % — SIGNIFICANT CHANGE UP (ref 0–6)
GLUCOSE SERPL-MCNC: 204 MG/DL — HIGH (ref 70–99)
HCT VFR BLD CALC: 39.3 % — SIGNIFICANT CHANGE UP (ref 39–50)
HGB BLD-MCNC: 13.3 G/DL — SIGNIFICANT CHANGE UP (ref 13–17)
IMM GRANULOCYTES NFR BLD AUTO: 0.8 % — SIGNIFICANT CHANGE UP (ref 0–1.5)
LYMPHOCYTES # BLD AUTO: 1.51 K/UL — SIGNIFICANT CHANGE UP (ref 1–3.3)
LYMPHOCYTES # BLD AUTO: 30.1 % — SIGNIFICANT CHANGE UP (ref 13–44)
MAGNESIUM SERPL-MCNC: 2 MG/DL — SIGNIFICANT CHANGE UP (ref 1.6–2.6)
MCHC RBC-ENTMCNC: 31.7 PG — SIGNIFICANT CHANGE UP (ref 27–34)
MCHC RBC-ENTMCNC: 33.8 % — SIGNIFICANT CHANGE UP (ref 32–36)
MCV RBC AUTO: 93.6 FL — SIGNIFICANT CHANGE UP (ref 80–100)
MONOCYTES # BLD AUTO: 0.52 K/UL — SIGNIFICANT CHANGE UP (ref 0–0.9)
MONOCYTES NFR BLD AUTO: 10.4 % — SIGNIFICANT CHANGE UP (ref 2–14)
NEUTROPHILS # BLD AUTO: 2.86 K/UL — SIGNIFICANT CHANGE UP (ref 1.8–7.4)
NEUTROPHILS NFR BLD AUTO: 57.1 % — SIGNIFICANT CHANGE UP (ref 43–77)
NRBC # FLD: 0 K/UL — SIGNIFICANT CHANGE UP (ref 0–0)
PHOSPHATE SERPL-MCNC: 3 MG/DL — SIGNIFICANT CHANGE UP (ref 2.5–4.5)
PLATELET # BLD AUTO: 137 K/UL — LOW (ref 150–400)
PMV BLD: 10.6 FL — SIGNIFICANT CHANGE UP (ref 7–13)
POTASSIUM SERPL-MCNC: 3.7 MMOL/L — SIGNIFICANT CHANGE UP (ref 3.5–5.3)
POTASSIUM SERPL-SCNC: 3.7 MMOL/L — SIGNIFICANT CHANGE UP (ref 3.5–5.3)
PROT SERPL-MCNC: 6.5 G/DL — SIGNIFICANT CHANGE UP (ref 6–8.3)
RBC # BLD: 4.2 M/UL — SIGNIFICANT CHANGE UP (ref 4.2–5.8)
RBC # FLD: 12.6 % — SIGNIFICANT CHANGE UP (ref 10.3–14.5)
SODIUM SERPL-SCNC: 140 MMOL/L — SIGNIFICANT CHANGE UP (ref 135–145)
VANCOMYCIN TROUGH SERPL-MCNC: 14.8 UG/ML — SIGNIFICANT CHANGE UP (ref 10–20)
WBC # BLD: 5.01 K/UL — SIGNIFICANT CHANGE UP (ref 3.8–10.5)
WBC # FLD AUTO: 5.01 K/UL — SIGNIFICANT CHANGE UP (ref 3.8–10.5)

## 2019-05-02 PROCEDURE — 93010 ELECTROCARDIOGRAM REPORT: CPT

## 2019-05-02 PROCEDURE — 99233 SBSQ HOSP IP/OBS HIGH 50: CPT | Mod: GC

## 2019-05-02 RX ORDER — ATORVASTATIN CALCIUM 80 MG/1
40 TABLET, FILM COATED ORAL AT BEDTIME
Qty: 0 | Refills: 0 | Status: DISCONTINUED | OUTPATIENT
Start: 2019-05-02 | End: 2019-05-04

## 2019-05-02 RX ORDER — SODIUM CHLORIDE 9 MG/ML
1000 INJECTION INTRAMUSCULAR; INTRAVENOUS; SUBCUTANEOUS
Qty: 0 | Refills: 0 | Status: DISCONTINUED | OUTPATIENT
Start: 2019-05-02 | End: 2019-05-04

## 2019-05-02 RX ADMIN — INSULIN GLARGINE 15 UNIT(S): 100 INJECTION, SOLUTION SUBCUTANEOUS at 22:43

## 2019-05-02 RX ADMIN — Medication 300 MILLIGRAM(S): at 21:34

## 2019-05-02 RX ADMIN — Medication 81 MILLIGRAM(S): at 11:15

## 2019-05-02 RX ADMIN — HEPARIN SODIUM 5000 UNIT(S): 5000 INJECTION INTRAVENOUS; SUBCUTANEOUS at 06:39

## 2019-05-02 RX ADMIN — Medication 1: at 18:16

## 2019-05-02 RX ADMIN — Medication 5 UNIT(S): at 18:16

## 2019-05-02 RX ADMIN — PIPERACILLIN AND TAZOBACTAM 25 GRAM(S): 4; .5 INJECTION, POWDER, LYOPHILIZED, FOR SOLUTION INTRAVENOUS at 14:42

## 2019-05-02 RX ADMIN — SODIUM CHLORIDE 82 MILLILITER(S): 9 INJECTION INTRAMUSCULAR; INTRAVENOUS; SUBCUTANEOUS at 23:32

## 2019-05-02 RX ADMIN — Medication 2: at 09:07

## 2019-05-02 RX ADMIN — Medication 5 UNIT(S): at 12:49

## 2019-05-02 RX ADMIN — Medication 2: at 12:49

## 2019-05-02 RX ADMIN — HEPARIN SODIUM 5000 UNIT(S): 5000 INJECTION INTRAVENOUS; SUBCUTANEOUS at 14:43

## 2019-05-02 RX ADMIN — PIPERACILLIN AND TAZOBACTAM 25 GRAM(S): 4; .5 INJECTION, POWDER, LYOPHILIZED, FOR SOLUTION INTRAVENOUS at 23:32

## 2019-05-02 RX ADMIN — PIPERACILLIN AND TAZOBACTAM 25 GRAM(S): 4; .5 INJECTION, POWDER, LYOPHILIZED, FOR SOLUTION INTRAVENOUS at 06:39

## 2019-05-02 RX ADMIN — Medication 300 MILLIGRAM(S): at 11:13

## 2019-05-02 RX ADMIN — Medication 5 UNIT(S): at 09:07

## 2019-05-02 NOTE — PROGRESS NOTE ADULT - PROBLEM SELECTOR PLAN 6
waiting to obtain old TTE report from primary cardiologist.  Patient not exhibiting any ACS, CHF symptoms.  RCRI score of 2. Good functional status at baseline.  Review EKG.  Continue perioperative beta-blocker.  Will re-assess after obtaining old cardiac work up result. waiting to obtain old TTE report from primary cardiologist.  Patient not exhibiting any ACS, CHF symptoms.  RCRI score of 2. Good functional status at baseline.  Reviewed EKG and old cardiology records.  Continue perioperative beta-blocker.  Medically optimized for the procedure.

## 2019-05-02 NOTE — PROGRESS NOTE ADULT - ASSESSMENT
73yo M w/ OM confirmed on outpatient XR of left foot 2nd toe distal phalanx  Pt seen and evaluated  Vitals stable, afebrile, no leukocytosis  Prelim wound culture growing STAU and serratia  Continue IV abx  Booked for OR for toe amputation Friday 5/3  Please document medical & likely cardiac optimization (pt has bypass/stenting hx) for light sedation/local anesthesia  Dressed toe w/ betadine and DSD  Discussed w/ attending

## 2019-05-02 NOTE — PROGRESS NOTE ADULT - PROBLEM SELECTOR PLAN 3
s/p 4 stents (that failed) and subsequent CABG in 2009, EKG: NSR 73, RBBB, TWI II, III, aVF, V1-V6  -c/w ASA for now  -monitor for CP or SOB, EKG shows ischemia from before but unclear how much is baseline. requested prior records from pt's cardiologist's office  - last echo in 2011. will obtain records from pt's cardiologist, who he says he sees regularly every 4 or 5 months  - started on statin s/p 4 stents (that failed) and subsequent CABG in 2009, EKG: NSR 73, RBBB, TWI II, III, aVF, V1-V6 - compared to baseline from the cardiologist's office, no change noted.  -c/w ASA for now  -monitor for CP or SOB, EKG shows ischemia from before but unclear how much is baseline. requested prior records from pt's cardiologist's office  - last echo in 2011. will obtain records from pt's cardiologist, who he says he sees regularly every 4 or 5 months  - started on statin

## 2019-05-02 NOTE — PROGRESS NOTE ADULT - PROBLEM SELECTOR PLAN 2
New diagnosis of DMII. Pt was previously diagnosed with insulin dependent diabetes many years ago, took insulin for 1 week and then self d/c'd.  -c/w ISS and FS monitoring and adjust  - likely will need to be on insulin as outpatient - patient had been on short course of insulin in the past.  -DM education especially w/ OM and CAD

## 2019-05-02 NOTE — CHART NOTE - NSCHARTNOTEFT_GEN_A_CORE
Obtained prior records from pt's cardiologist, Dr. Toni Lopez ([place])     Most recent echo 2011 (report in chart). Impression: "LV size, wall thickness, and systolic function are normal, with an EF of 60%. The diastolic filling pattern indicates impaired relaxation. The pericardium is normal."    Holter Report Summary (2011) results "normal sinus rhythm with occasional PVCs. No significant atrial or ventricular ectopy seen. No pauses."    Pt with no clinical change in cardiopulmonary status since most recent echo. Patient not exhibiting any signs of symptoms of ACS nor CHF.  RCRI score of 2. Good functional status at baseline. >5 Mets. Continuing perioperative beta-blocker. Medically optimized with beta blocker, ASA 81, statin, and glucose control. Obtained prior records from pt's cardiologist, Dr. Toni Lopez ([place])     Most recent echo 2011 (report in chart). Impression: "LV size, wall thickness, and systolic function are normal, with an EF of 60%. The diastolic filling pattern indicates impaired relaxation. The pericardium is normal."    Holter Report Summary (2011) results "normal sinus rhythm with occasional PVCs. No significant atrial or ventricular ectopy seen. No pauses."    EKG compared to ones from the office.  No change to baseline EKG - RBBB present.    Pt with no clinical change in cardiopulmonary status since most recent echo. Patient not exhibiting any signs of symptoms of ACS nor CHF.  RCRI score of 2. Good functional status at baseline. >5 Mets. Continuing perioperative beta-blocker. Medically optimized with beta blocker, ASA 81, statin, and glucose control.    Attending addendum:  Agree with resident note and place above. Patient does not require any further cardiac imaging modalities.  Medically optimized for the procedure.    Marcelino Lewis MD  pager# [phone]

## 2019-05-02 NOTE — PROGRESS NOTE ADULT - ASSESSMENT
73 y/o M PMH CAD s/p 4 stents (2009) that failed and subsequent CABG (2009), was sent in from his podiatrist's office for 2nd L toe infection and now found to have OM. Planning for amputation on 5/3.

## 2019-05-02 NOTE — PROGRESS NOTE ADULT - SUBJECTIVE AND OBJECTIVE BOX
Patient is a 74y old  Male who presents with a chief complaint of Osteomyelitis (01 May 2019 11:57)     INTERVAL HPI/OVERNIGHT EVENTS:   Yesterday evening, provider sat down with pt and wife at bedside and had very lengthy, in-depth discussion about his newly diagnosed diabetes requiring insulin. Recommended lifestyle modification in addition to strict medication compliance, and explained adverse health consequences of longstanding uncontrolled diabetes. Pt expressed understanding at the time.  No acute events overnight. This morning, pt says he feels well. No complaints.     REVIEW OF SYSTEMS:  Constitutional: Denies fever, weight loss, fatigue  Neuro: Denies HA, weakness, numbness  Resp: Denies SOB, cough, hemoptysis  CV: Denies chest pain, palpitations, dizziness  GI: Denies abdominal pain, N/V/D/C, melena, hematochezia  : Denies dysuria, increased frequency, hematuria  Skin: Denies rashes, lesions  MSK: Denies joint pain, swelling    MEDICATIONS  (STANDING):  aspirin  chewable 81 milliGRAM(s) Oral daily  atorvastatin 40 milliGRAM(s) Oral at bedtime  dextrose 5%. 1000 milliLiter(s) (50 mL/Hr) IV Continuous <Continuous>  dextrose 50% Injectable 12.5 Gram(s) IV Push once  dextrose 50% Injectable 25 Gram(s) IV Push once  dextrose 50% Injectable 25 Gram(s) IV Push once  heparin  Injectable 5000 Unit(s) SubCutaneous every 8 hours  insulin glargine Injectable (LANTUS) 15 Unit(s) SubCutaneous at bedtime  insulin lispro (HumaLOG) corrective regimen sliding scale   SubCutaneous three times a day before meals  insulin lispro (HumaLOG) corrective regimen sliding scale   SubCutaneous at bedtime  insulin lispro Injectable (HumaLOG) 5 Unit(s) SubCutaneous three times a day with meals  piperacillin/tazobactam IVPB. 3.375 Gram(s) IV Intermittent every 8 hours  vancomycin  IVPB 1500 milliGRAM(s) IV Intermittent every 12 hours    MEDICATIONS  (PRN):  acetaminophen   Tablet .. 650 milliGRAM(s) Oral every 6 hours PRN Temp greater or equal to 38C (100.4F), Mild Pain (1 - 3), Moderate Pain (4 - 6)  dextrose 40% Gel 15 Gram(s) Oral once PRN Blood Glucose LESS THAN 70 milliGRAM(s)/deciliter  glucagon  Injectable 1 milliGRAM(s) IntraMuscular once PRN Glucose LESS THAN 70 milligrams/deciliter    Allergies:  No Known Allergies      VITAL SIGNS:  T(C): 36.5 (05-02-19 @ 06:45), Max: 36.8 (05-01-19 @ 15:00)  HR: 62 (05-02-19 @ 06:45) (59 - 75)  BP: 141/61 (05-02-19 @ 06:45) (129/61 - 147/68)  RR: 18 (05-02-19 @ 06:45) (18 - 20)  SpO2: 98% (05-02-19 @ 06:45) (98% - 100%)    PHYSICAL EXAM:  General: NAD  Neuro: AAOx3. No FND.  HEENT: Normocephalic, atraumatic. EOMI. PERRL. Conjunctiva and sclera clear. Mucous membranes moist, without lesions. Neck supple. No JVD.  Resp: Non-labored breathing. Clear to auscultation bilaterally; no rales, rhonchi, or wheezing  Cardiovascular: Regular rate and rhythm; no murmurs, rubs, or gallops.   Abd: +BS, soft, nontender, obese but nondistended  Ext:  L foot wrapped in bandage, mild serosanguinous drainage from left second toe, L foot erythematous. No LE edema  Skin: Warm, dry    LABS:                        13.3   5.01  )-----------( 137      ( 02 May 2019 06:55 )             39.3     02 May 2019 06:55    140    |  102    |  13     ----------------------------<  204    3.7     |  27     |  0.98     Ca    8.8        02 May 2019 06:55  Phos  3.0       02 May 2019 06:55  Mg     2.0       02 May 2019 06:55    TPro  6.5    /  Alb  3.5    /  TBili  0.6    /  DBili  x      /  AST  23     /  ALT  27     /  AlkPhos  53     02 May 2019 06:55    CAPILLARY BLOOD GLUCOSE      POCT Blood Glucose.: 213 mg/dL (02 May 2019 09:00)  POCT Blood Glucose.: 198 mg/dL (01 May 2019 22:11)  POCT Blood Glucose.: 217 mg/dL (01 May 2019 18:12)    BLOOD CULTURE  04-30 @ 20:41   STAU^Staphylococcus aureus  SM^Serratia marcescens    NO ORGANISMS ISOLATED  NO ORGANISMS ISOLATED AT 24 HOURS  --    RADIOLOGY & ADDITIONAL TESTS:      Patient name: DEONTE PATTERSON  Date of test: 5/1/2019  MR#: [id number]  [facility] #: [id number]    Location: [facility] Physician(s): , Not Available Doctor, MD  Interpreted by: Filemon Cole MD, [facility], [facility], [facility]  Tech: Imtiaz Ponce [facility]  Type of Test: LE Arterial: LILIA/Segm.  ------------------------------------------------------------------------  Procedure: Segmental Pressure/Waveform - complete  noninvasive physiologic studies of the bilateral lower  extremity arteries.  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 (I70.25)  ------------------------------------------------------------------------  PRESSURE (mm Hg):  ------------------------------------------------------------------------  RIGHT (BP):  Brachial: 159  High Thigh:  Low Thigh:  Calf:  Ankle-PT:  Ankle-DP:  Greate Toe: 118  LILIA:  ------------------------------------------------------------------------  LEFT (BP):  Brachial: 160  High Thigh:  Low Thigh:  Calf:  Ankle-PT:  Ankle-DP:  Greate Toe: 115  LILIA:  ------------------------------------------------------------------------  WAVEFORM:  ------------------------------------------------------------------------  RIGHT:  CFA:  Popliteal:  Ankle-PT:  Ankle-DP:  ------------------------------------------------------------------------  LEFT:  CFA:  Popliteal:  Ankle-PT:  Ankle-DP:  ------------------------------------------------------------------------  PSA/AVF:  ------------------------------------------------------------------------  RIGHT:  Pseudoaneurysm:  A-V fistula:  TBI: 0.74  ------------------------------------------------------------------------  LEFT:  Pseudoaneurysm:  A-V fistula:  TBI: 0.72  ------------------------------------------------------------------------  Right Findings: The ankle-brachial index (LILIA) on the  right cannot be accurately assessed because of  non-compressible (calcified) vessels.  The toe-brachial index (TBI) on the right is normal  (0.74). The toe pressure is 118 mmHg.  Pulse volume recording (PVR) waveforms appear triphasic  with normal amplitudes throughout the right lower  extremity.  Left Findings: The ankle-brachial index (LILIA) on the left  cannot be accurately assessed because of non-compressible  (calcified) vessels.  The toe-brachial index (TBI) on the left is normal (0.72).  The toe pressure is 115 mmHg.  Pulse volume recording (PVR) waveforms appear triphasic  with normal amplitudes throughout the left lower  extremity.  ------------------------------------------------------------------------  Summary/Impressions:  1. Right LILIA was not accurately assessed because of  non-compressible (calcified) vessels. Right TBI is normal  (0.74). Waveform analysis reveals no significant occlusive  arterial disease in the right lower extremity.  2. Left LILIA was not accurately assessed because of  non-compressible (calcified) vessels. Left TBI is normal  (0.72). Waveform analysis reveals no significant occlusive  arterial disease in the left lower extremity.  ------------------------------------------------------------------------      Consultant(s) Notes Reviewed:      Care Discussed with Consultants/Other Providers:    ----------------------------------------  CONTACT: Raquel Collins  Internal Medicine, PGY-1  [facility] Pager: [phone]  [facility] Pager [phone]  -----------------------------------------

## 2019-05-02 NOTE — PROGRESS NOTE ADULT - SUBJECTIVE AND OBJECTIVE BOX
Podiatry pager #: [phone] ([place])/ [phone] ([facility])    Patient is a 74y old  Male who presents with a chief complaint of Osteomyelitis (01 May 2019 11:57)       INTERVAL HPI/OVERNIGHT EVENTS:  Patient seen and evaluated at bedside.  Pt is resting comfortable in NAD. Denies N/V/F/C.     Allergies    No Known Allergies    Intolerances        Vital Signs Last 24 Hrs  T(C): 36.5 (02 May 2019 06:45), Max: 36.8 (01 May 2019 15:00)  T(F): 97.7 (02 May 2019 06:45), Max: 98.3 (01 May 2019 15:00)  HR: 62 (02 May 2019 06:45) (59 - 75)  BP: 141/61 (02 May 2019 06:45) (129/61 - 147/68)  BP(mean): --  RR: 18 (02 May 2019 06:45) (18 - 20)  SpO2: 98% (02 May 2019 06:45) (98% - 100%)    LABS:                        13.3   5.01  )-----------( 137      ( 02 May 2019 06:55 )             39.3     05-02    140  |  102  |  13  ----------------------------<  204<H>  3.7   |  27  |  0.98    Ca    8.8      02 May 2019 06:55  Phos  3.0     05-02  Mg     2.0     05-02    TPro  6.5  /  Alb  3.5  /  TBili  0.6  /  DBili  x   /  AST  23  /  ALT  27  /  AlkPhos  53  05-02    PT/INR - ( 30 Apr 2019 20:18 )   PT: 11.6 SEC;   INR: 1.04          PTT - ( 30 Apr 2019 20:18 )  PTT:29.9 SEC    CAPILLARY BLOOD GLUCOSE      POCT Blood Glucose.: 213 mg/dL (02 May 2019 09:00)  POCT Blood Glucose.: 198 mg/dL (01 May 2019 22:11)  POCT Blood Glucose.: 217 mg/dL (01 May 2019 18:12)      Lower Extremity Physical Exam:  Vascular: DP 2/4 PT 0/4, B/L, CFT <3 seconds B/L, Temperature gradient increased to LLE, B/L.   Neuro: Epicritic sensation diminished to the level of toes, B/L.  Skin: erythema local to toe only, proximally noted to be some swelling at distal calf although with disconnect from digit as source possibly separate cellulitis, nonpitting edema, positive probe to bone of 2nd toe ulceration left foot  Wound #1:   Location: 2nd toe distal tipe  Size: .3cm x .3cm  Depth: bone  Wound bed: fibrous   Drainage: scant purulence  Odor: none   Periwound: fibrous/hyperkeratotic  Etiology: neuropathic? not sensate no hx of DM    RADIOLOGY & ADDITIONAL TESTS:  < from: Xray Foot AP + Lateral + Oblique, Left (04.30.19 @ 20:31) >    EXAM:  RAD FOOT MIN 3 VIEWS LT        PROCEDURE DATE:  Apr 30 2019         INTERPRETATION:  CLINICAL INDICATION: Left second toe injury. Evaluate   for osteomyelitis.    TECHNIQUE: 3 views of the left foot.    COMPARISON: None.    IMPRESSION:    Significant soft tissue swelling about the distal second digit. Cortical   osseous destruction of the distal second phalanx consistent with   osteomyelitis. No subcutaneous emphysema.    No acute fracture or dislocation. Joint spaces are maintained. Calcaneal   enthesophyte formation. Mild, diffuse soft tissue swelling about the   foot. Vascular calcifications. There are surgical clips within the distal   leg.              LAILA CARPIO M.D., RADIOLOGY RESIDENT  This document has been electronically signed.  ALEX BAKER M.D., ATTENDING RADIOLOGIST  This document has been electronically signed. May  1 2019  6:10AM        < end of copied text >

## 2019-05-03 ENCOUNTER — TRANSCRIPTION ENCOUNTER (OUTPATIENT)
Age: 74
End: 2019-05-03

## 2019-05-03 ENCOUNTER — RESULT REVIEW (OUTPATIENT)
Age: 74
End: 2019-05-03

## 2019-05-03 LAB
-  AMIKACIN: SIGNIFICANT CHANGE UP
-  AMPICILLIN/SULBACTAM: SIGNIFICANT CHANGE UP
-  AMPICILLIN: SIGNIFICANT CHANGE UP
-  AZTREONAM: SIGNIFICANT CHANGE UP
-  CEFAZOLIN: SIGNIFICANT CHANGE UP
-  CEFAZOLIN: SIGNIFICANT CHANGE UP
-  CEFEPIME: SIGNIFICANT CHANGE UP
-  CEFOXITIN: SIGNIFICANT CHANGE UP
-  CEFTAZIDIME: SIGNIFICANT CHANGE UP
-  CEFTRIAXONE: SIGNIFICANT CHANGE UP
-  CIPROFLOXACIN: SIGNIFICANT CHANGE UP
-  CIPROFLOXACIN: SIGNIFICANT CHANGE UP
-  CLINDAMYCIN: SIGNIFICANT CHANGE UP
-  ERTAPENEM: SIGNIFICANT CHANGE UP
-  ERYTHROMYCIN: SIGNIFICANT CHANGE UP
-  GENTAMICIN: SIGNIFICANT CHANGE UP
-  GENTAMICIN: SIGNIFICANT CHANGE UP
-  IMIPENEM: SIGNIFICANT CHANGE UP
-  LEVOFLOXACIN: SIGNIFICANT CHANGE UP
-  LEVOFLOXACIN: SIGNIFICANT CHANGE UP
-  MEROPENEM: SIGNIFICANT CHANGE UP
-  MOXIFLOXACIN(AEROBIC): SIGNIFICANT CHANGE UP
-  OXACILLIN: SIGNIFICANT CHANGE UP
-  PENICILLIN: SIGNIFICANT CHANGE UP
-  PIPERACILLIN/TAZOBACTAM: SIGNIFICANT CHANGE UP
-  RIFAMPIN.: SIGNIFICANT CHANGE UP
-  TETRACYCLINE: SIGNIFICANT CHANGE UP
-  TIGECYCLINE: SIGNIFICANT CHANGE UP
-  TOBRAMYCIN: SIGNIFICANT CHANGE UP
-  TRIMETHOPRIM/SULFAMETHOXAZOLE: SIGNIFICANT CHANGE UP
-  TRIMETHOPRIM/SULFAMETHOXAZOLE: SIGNIFICANT CHANGE UP
-  VANCOMYCIN: SIGNIFICANT CHANGE UP
ANION GAP SERPL CALC-SCNC: 10 MMO/L — SIGNIFICANT CHANGE UP (ref 7–14)
APTT BLD: 29.4 SEC — SIGNIFICANT CHANGE UP (ref 27.5–36.3)
BLD GP AB SCN SERPL QL: NEGATIVE — SIGNIFICANT CHANGE UP
BUN SERPL-MCNC: 13 MG/DL — SIGNIFICANT CHANGE UP (ref 7–23)
CALCIUM SERPL-MCNC: 8.7 MG/DL — SIGNIFICANT CHANGE UP (ref 8.4–10.5)
CHLORIDE SERPL-SCNC: 106 MMOL/L — SIGNIFICANT CHANGE UP (ref 98–107)
CHOLEST SERPL-MCNC: 115 MG/DL — LOW (ref 120–199)
CO2 SERPL-SCNC: 26 MMOL/L — SIGNIFICANT CHANGE UP (ref 22–31)
CREAT SERPL-MCNC: 0.88 MG/DL — SIGNIFICANT CHANGE UP (ref 0.5–1.3)
GLUCOSE SERPL-MCNC: 164 MG/DL — HIGH (ref 70–99)
GRAM STN SPEC: SIGNIFICANT CHANGE UP
GRAM STN WND: SIGNIFICANT CHANGE UP
GRAM STN WND: SIGNIFICANT CHANGE UP
HCT VFR BLD CALC: 38.3 % — LOW (ref 39–50)
HDLC SERPL-MCNC: 34 MG/DL — LOW (ref 35–55)
HGB BLD-MCNC: 13 G/DL — SIGNIFICANT CHANGE UP (ref 13–17)
INR BLD: 1.06 — SIGNIFICANT CHANGE UP (ref 0.88–1.17)
LIPID PNL WITH DIRECT LDL SERPL: 75 MG/DL — SIGNIFICANT CHANGE UP
MCHC RBC-ENTMCNC: 31.2 PG — SIGNIFICANT CHANGE UP (ref 27–34)
MCHC RBC-ENTMCNC: 33.9 % — SIGNIFICANT CHANGE UP (ref 32–36)
MCV RBC AUTO: 91.8 FL — SIGNIFICANT CHANGE UP (ref 80–100)
METHOD TYPE: SIGNIFICANT CHANGE UP
METHOD TYPE: SIGNIFICANT CHANGE UP
NRBC # FLD: 0.02 K/UL — SIGNIFICANT CHANGE UP (ref 0–0)
ORGANISM # SPEC MICROSCOPIC CNT: SIGNIFICANT CHANGE UP
PLATELET # BLD AUTO: 154 K/UL — SIGNIFICANT CHANGE UP (ref 150–400)
PMV BLD: 10.7 FL — SIGNIFICANT CHANGE UP (ref 7–13)
POTASSIUM SERPL-MCNC: 3.7 MMOL/L — SIGNIFICANT CHANGE UP (ref 3.5–5.3)
POTASSIUM SERPL-SCNC: 3.7 MMOL/L — SIGNIFICANT CHANGE UP (ref 3.5–5.3)
PROTHROM AB SERPL-ACNC: 12.1 SEC — SIGNIFICANT CHANGE UP (ref 9.8–13.1)
RBC # BLD: 4.17 M/UL — LOW (ref 4.2–5.8)
RBC # FLD: 12.7 % — SIGNIFICANT CHANGE UP (ref 10.3–14.5)
RH IG SCN BLD-IMP: POSITIVE — SIGNIFICANT CHANGE UP
SODIUM SERPL-SCNC: 142 MMOL/L — SIGNIFICANT CHANGE UP (ref 135–145)
SPECIMEN SOURCE: SIGNIFICANT CHANGE UP
SPECIMEN SOURCE: SIGNIFICANT CHANGE UP
TRIGL SERPL-MCNC: 117 MG/DL — SIGNIFICANT CHANGE UP (ref 10–149)
VANCOMYCIN TROUGH SERPL-MCNC: 13.9 UG/ML — SIGNIFICANT CHANGE UP (ref 10–20)
WBC # BLD: 6.07 K/UL — SIGNIFICANT CHANGE UP (ref 3.8–10.5)
WBC # FLD AUTO: 6.07 K/UL — SIGNIFICANT CHANGE UP (ref 3.8–10.5)

## 2019-05-03 PROCEDURE — 88311 DECALCIFY TISSUE: CPT | Mod: 26

## 2019-05-03 PROCEDURE — 99233 SBSQ HOSP IP/OBS HIGH 50: CPT | Mod: GC

## 2019-05-03 PROCEDURE — 88305 TISSUE EXAM BY PATHOLOGIST: CPT | Mod: 26

## 2019-05-03 PROCEDURE — 73630 X-RAY EXAM OF FOOT: CPT | Mod: 26,LT

## 2019-05-03 RX ORDER — OXYCODONE AND ACETAMINOPHEN 5; 325 MG/1; MG/1
1 TABLET ORAL EVERY 4 HOURS
Qty: 0 | Refills: 0 | Status: DISCONTINUED | OUTPATIENT
Start: 2019-05-03 | End: 2019-05-04

## 2019-05-03 RX ORDER — ACETAMINOPHEN 500 MG
650 TABLET ORAL EVERY 6 HOURS
Qty: 0 | Refills: 0 | Status: DISCONTINUED | OUTPATIENT
Start: 2019-05-03 | End: 2019-05-04

## 2019-05-03 RX ADMIN — SODIUM CHLORIDE 82 MILLILITER(S): 9 INJECTION INTRAMUSCULAR; INTRAVENOUS; SUBCUTANEOUS at 12:55

## 2019-05-03 RX ADMIN — Medication 300 MILLIGRAM(S): at 22:19

## 2019-05-03 RX ADMIN — Medication 1: at 08:56

## 2019-05-03 RX ADMIN — INSULIN GLARGINE 15 UNIT(S): 100 INJECTION, SOLUTION SUBCUTANEOUS at 22:45

## 2019-05-03 RX ADMIN — PIPERACILLIN AND TAZOBACTAM 25 GRAM(S): 4; .5 INJECTION, POWDER, LYOPHILIZED, FOR SOLUTION INTRAVENOUS at 22:16

## 2019-05-03 RX ADMIN — Medication 1: at 12:55

## 2019-05-03 RX ADMIN — PIPERACILLIN AND TAZOBACTAM 25 GRAM(S): 4; .5 INJECTION, POWDER, LYOPHILIZED, FOR SOLUTION INTRAVENOUS at 06:25

## 2019-05-03 RX ADMIN — Medication 5 UNIT(S): at 17:14

## 2019-05-03 RX ADMIN — ATORVASTATIN CALCIUM 40 MILLIGRAM(S): 80 TABLET, FILM COATED ORAL at 22:17

## 2019-05-03 RX ADMIN — PIPERACILLIN AND TAZOBACTAM 25 GRAM(S): 4; .5 INJECTION, POWDER, LYOPHILIZED, FOR SOLUTION INTRAVENOUS at 15:22

## 2019-05-03 RX ADMIN — Medication 1: at 17:14

## 2019-05-03 RX ADMIN — Medication 300 MILLIGRAM(S): at 11:04

## 2019-05-03 NOTE — DISCHARGE NOTE PROVIDER - HOSPITAL COURSE
HPI on admission:     73 y/o M PMH CAD s/p 4 stents (2009) that failed and subsequent CABG (2009) was sent in from his podiatrist's office for 2nd L toe that was injured after cutting his toe nail 2 weeks ago. He was triming his nails and then cut himself and it started to bleed. He tried putting neosporin but it kept getting worse with erythema. He denied seeing any pus but it did become more tender and painful. He went to his podiatrist Dr. Ferguson today was found to have an erythematous and tender toe concerning for cellulitis or osteomyelitis and told to go to ED. No f/n/v/d/CP/SOB/abd pain/dysuria, sick contacts and recently came back from [place]. He denies having DM but hasn't seen a PMD in years. Found on X Xray of L 2nd toe to have OM. Given in the ED vanco and zosyn and 1L LR.         Hospital Course:    Pt was started on broad spectrum antibiotics and seen by podiatry who decided to amputate 2nd toe due to evidence of osteomyelitis on XR foot. Pt was found to have an elevated 9.1% and pt was started on insulin. Pt underwent amputation of LF 2nd toe without complications. Pt underwent diabetes education and was informed to follow up with both podiatry and PCP. Pt to have 7 day course of Bactrim as per Podiatry. Pt hemodynamically stable, able to ambulate without assistance, and ready for discharge.

## 2019-05-03 NOTE — PROGRESS NOTE ADULT - PROBLEM SELECTOR PLAN 3
s/p 4 stents (that failed) and subsequent CABG in 2009, EKG: NSR 73, RBBB, TWI II, III, aVF, V1-V6 - compared to baseline from the cardiologist's office, no change noted.  -c/w ASA   -obtained records from pt's outpt cardiologist. EKG at baseline. most recent echo w/ normal LV systolic function, EF 60%.  - c/w statin

## 2019-05-03 NOTE — DISCHARGE NOTE PROVIDER - NSDCCPCAREPLAN_GEN_ALL_CORE_FT
PRINCIPAL DISCHARGE DIAGNOSIS  Diagnosis: Osteomyelitis  Assessment and Plan of Treatment: [facility]. That is to say you have an infection of your bone. For this, you were given IV antibiotics and you underwent surgery to remove the infected bone. Podiatry recomends taking 7 more days of oral antibiotics and close follow up.      SECONDARY DISCHARGE DIAGNOSES  Diagnosis: Type 2 diabetes mellitus  Assessment and Plan of Treatment: You were found to have diabetes mellitus type 2, that is to say that your blood sugar level is chronically elevated. For this, you were started on insulin, which you should continue taking at home. Please check your blood glucose 3 times a day before meals and take your insulin as directed. Please follow up with your PCP and get your A1C checked every 3 months.

## 2019-05-03 NOTE — DISCHARGE NOTE PROVIDER - NSDCFUADDINST_GEN_ALL_CORE_FT
Podiatry Discharge Instructions:  Follow up: Please follow up with Dr. Orr within 1 week of discharge from the hospital, please call [phone] for appointment and discuss that you recently were seen in the hospital. ([id number], [place])  Wound Care: Please leave your dressing clean dry intact until your follow up appointment.  Weight bearing: Please weight bear as tolerated in a surgical shoe.  Antibiotics: Please continue as instructed.

## 2019-05-03 NOTE — PROGRESS NOTE ADULT - SUBJECTIVE AND OBJECTIVE BOX
Podiatry Pager #: [phone]    Patient is a 74y old  Male who presents with a chief complaint of osteomyelitis (02 May 2019 12:10)      INTERVAL HPI/OVERNIGHT EVENTS:   Pt is scheduled for LF partial 2nd toe amputation with Dr. Orr at 1 pm.   Patient is aware of procedure and is NPO since midnight.    MEDICATIONS  (STANDING):  aspirin  chewable 81 milliGRAM(s) Oral daily  atorvastatin 40 milliGRAM(s) Oral at bedtime  dextrose 5%. 1000 milliLiter(s) (50 mL/Hr) IV Continuous <Continuous>  dextrose 50% Injectable 12.5 Gram(s) IV Push once  dextrose 50% Injectable 25 Gram(s) IV Push once  dextrose 50% Injectable 25 Gram(s) IV Push once  insulin glargine Injectable (LANTUS) 15 Unit(s) SubCutaneous at bedtime  insulin lispro (HumaLOG) corrective regimen sliding scale   SubCutaneous three times a day before meals  insulin lispro (HumaLOG) corrective regimen sliding scale   SubCutaneous at bedtime  insulin lispro Injectable (HumaLOG) 5 Unit(s) SubCutaneous three times a day with meals  piperacillin/tazobactam IVPB. 3.375 Gram(s) IV Intermittent every 8 hours  sodium chloride 0.9%. 1000 milliLiter(s) (82 mL/Hr) IV Continuous <Continuous>  vancomycin  IVPB 1500 milliGRAM(s) IV Intermittent every 12 hours    MEDICATIONS  (PRN):  acetaminophen   Tablet .. 650 milliGRAM(s) Oral every 6 hours PRN Temp greater or equal to 38C (100.4F), Mild Pain (1 - 3), Moderate Pain (4 - 6)  dextrose 40% Gel 15 Gram(s) Oral once PRN Blood Glucose LESS THAN 70 milliGRAM(s)/deciliter  glucagon  Injectable 1 milliGRAM(s) IntraMuscular once PRN Glucose LESS THAN 70 milligrams/deciliter      Allergies    No Known Allergies    Intolerances        Vital Signs Last 24 Hrs  T(C): 36.9 (03 May 2019 06:22), Max: 36.9 (02 May 2019 22:00)  T(F): 98.5 (03 May 2019 06:22), Max: 98.5 (02 May 2019 22:00)  HR: 61 (03 May 2019 06:22) (61 - 70)  BP: 127/61 (03 May 2019 06:22) (127/61 - 154/65)  BP(mean): --  RR: 18 (03 May 2019 06:22) (16 - 18)  SpO2: 100% (03 May 2019 06:22) (98% - 100%)    LABS:                        13.0   6.07  )-----------( 154      ( 03 May 2019 05:45 )             38.3     05-03    142  |  106  |  13  ----------------------------<  164<H>  3.7   |  26  |  0.88    Ca    8.7      03 May 2019 05:47  Phos  3.0     05-02  Mg     2.0     05-02    TPro  6.5  /  Alb  3.5  /  TBili  0.6  /  DBili  x   /  AST  23  /  ALT  27  /  AlkPhos  53  05-02    PT/INR - ( 03 May 2019 05:47 )   PT: 12.1 SEC;   INR: 1.06          PTT - ( 03 May 2019 05:47 )  PTT:29.4 SEC    CAPILLARY BLOOD GLUCOSE      POCT Blood Glucose.: 157 mg/dL (03 May 2019 05:36)  POCT Blood Glucose.: 166 mg/dL (03 May 2019 00:27)  POCT Blood Glucose.: 183 mg/dL (02 May 2019 22:19)  POCT Blood Glucose.: 163 mg/dL (02 May 2019 17:59)  POCT Blood Glucose.: 232 mg/dL (02 May 2019 12:28)  POCT Blood Glucose.: 213 mg/dL (02 May 2019 09:00)      RADIOLOGY & ADDITIONAL TESTS:

## 2019-05-03 NOTE — PROGRESS NOTE ADULT - PROBLEM SELECTOR PLAN 6
Patient not exhibiting any ACS, CHF symptoms.  RCRI score of 2. Good functional status at baseline.  Reviewed EKG and old cardiology records.  Continue perioperative beta-blocker.  Medically optimized for the procedure.

## 2019-05-03 NOTE — PROGRESS NOTE ADULT - SUBJECTIVE AND OBJECTIVE BOX
Patient is a 74y old  Male who presents with a chief complaint of osteomyelitis (02 May 2019 12:10)     INTERVAL HPI/OVERNIGHT EVENTS: No acute events overnight. This morning, pt feels fine. No new complaints. Demonstrates poor understanding of plan, despite having plan explained to him numerous times. Reiterated plan with patient once more. Pt expresses understanding.    REVIEW OF SYSTEMS:  Negative unless stated above    MEDICATIONS  (STANDING):  aspirin  chewable 81 milliGRAM(s) Oral daily  atorvastatin 40 milliGRAM(s) Oral at bedtime  dextrose 5%. 1000 milliLiter(s) (50 mL/Hr) IV Continuous <Continuous>  dextrose 50% Injectable 12.5 Gram(s) IV Push once  dextrose 50% Injectable 25 Gram(s) IV Push once  dextrose 50% Injectable 25 Gram(s) IV Push once  insulin glargine Injectable (LANTUS) 15 Unit(s) SubCutaneous at bedtime  insulin lispro (HumaLOG) corrective regimen sliding scale   SubCutaneous three times a day before meals  insulin lispro (HumaLOG) corrective regimen sliding scale   SubCutaneous at bedtime  insulin lispro Injectable (HumaLOG) 5 Unit(s) SubCutaneous three times a day with meals  piperacillin/tazobactam IVPB. 3.375 Gram(s) IV Intermittent every 8 hours  sodium chloride 0.9%. 1000 milliLiter(s) (82 mL/Hr) IV Continuous <Continuous>  vancomycin  IVPB 1500 milliGRAM(s) IV Intermittent every 12 hours    MEDICATIONS  (PRN):  acetaminophen   Tablet .. 650 milliGRAM(s) Oral every 6 hours PRN Temp greater or equal to 38C (100.4F), Mild Pain (1 - 3), Moderate Pain (4 - 6)  dextrose 40% Gel 15 Gram(s) Oral once PRN Blood Glucose LESS THAN 70 milliGRAM(s)/deciliter  glucagon  Injectable 1 milliGRAM(s) IntraMuscular once PRN Glucose LESS THAN 70 milligrams/deciliter    Allergies:  No Known Allergies      VITAL SIGNS:  T(C): 36.9 (05-03-19 @ 06:22), Max: 36.9 (05-02-19 @ 22:00)  HR: 61 (05-03-19 @ 06:22) (61 - 70)  BP: 127/61 (05-03-19 @ 06:22) (127/61 - 154/65)  RR: 18 (05-03-19 @ 06:22) (16 - 18)  SpO2: 100% (05-03-19 @ 06:22) (98% - 100%)    PHYSICAL EXAM:  General: NAD  Neuro: AAOx3. No FND.  HEENT: Normocephalic, atraumatic. EOMI. PERRL. Conjunctiva and sclera clear. Mucous membranes moist, without lesions. Neck supple. No JVD.  Resp: Non-labored breathing. Clear to auscultation bilaterally; no rales, rhonchi, or wheezing  Cardiovascular: Regular rate and rhythm; no murmurs, rubs, or gallops.   Abd: +BS, soft, nontender, obese but nondistended  Ext:  L foot wrapped in bandage, mild serosanguinous drainage from left second toe, L foot erythematous. No LE edema  Skin: Warm, dry    LABS:                        13.0   6.07  )-----------( 154      ( 03 May 2019 05:45 )             38.3     03 May 2019 05:47    142    |  106    |  13     ----------------------------<  164    3.7     |  26     |  0.88     Ca    8.7        03 May 2019 05:47    PT/INR - ( 03 May 2019 05:47 )   PT: 12.1 SEC;   INR: 1.06          PTT - ( 03 May 2019 05:47 )  PTT:29.4 SEC  CAPILLARY BLOOD GLUCOSE      POCT Blood Glucose.: 157 mg/dL (03 May 2019 05:36)  POCT Blood Glucose.: 166 mg/dL (03 May 2019 00:27)  POCT Blood Glucose.: 183 mg/dL (02 May 2019 22:19)  POCT Blood Glucose.: 163 mg/dL (02 May 2019 17:59)  POCT Blood Glucose.: 232 mg/dL (02 May 2019 12:28)  POCT Blood Glucose.: 213 mg/dL (02 May 2019 09:00)    BLOOD CULTURE  04-30 @ 20:41   STAU^Staphylococcus aureus  SM^Serratia marcescens    NO ORGANISMS ISOLATED  NO ORGANISMS ISOLATED AT 48 HRS.  --    ----------------------------------------  CONTACT: Raquel Collins  Internal Medicine, PGY-1  [facility] Pager: [phone]  [facility] Pager [phone]  -----------------------------------------

## 2019-05-03 NOTE — PROGRESS NOTE ADULT - ASSESSMENT
Plan:   To OR today for LF partial 2nd toe amputation with Dr. Orr at 1 pm.   Medical/Cardiac clearance since 5/2 and documented in chart.  Consent signed and in chart.  Procedure was explained to patient in detail. All alternatives, risks and complications were discussed. All questions answered.

## 2019-05-03 NOTE — DISCHARGE NOTE PROVIDER - CARE PROVIDER_API CALL
Richie Roldan)  EndocrinologyMetabDiabetes; Internal Medicine  [address], [address] [place] [address], [place] [address]  Phone: [phone]  Fax: [phone]  Follow Up Time:

## 2019-05-04 ENCOUNTER — TRANSCRIPTION ENCOUNTER (OUTPATIENT)
Age: 74
End: 2019-05-04

## 2019-05-04 VITALS
OXYGEN SATURATION: 99 % | SYSTOLIC BLOOD PRESSURE: 147 MMHG | RESPIRATION RATE: 17 BRPM | TEMPERATURE: 98 F | DIASTOLIC BLOOD PRESSURE: 76 MMHG | HEART RATE: 80 BPM

## 2019-05-04 LAB
ALBUMIN SERPL ELPH-MCNC: 3.3 G/DL — SIGNIFICANT CHANGE UP (ref 3.3–5)
ALP SERPL-CCNC: 48 U/L — SIGNIFICANT CHANGE UP (ref 40–120)
ALT FLD-CCNC: 27 U/L — SIGNIFICANT CHANGE UP (ref 4–41)
ANION GAP SERPL CALC-SCNC: 11 MMO/L — SIGNIFICANT CHANGE UP (ref 7–14)
AST SERPL-CCNC: 27 U/L — SIGNIFICANT CHANGE UP (ref 4–40)
BILIRUB SERPL-MCNC: 0.5 MG/DL — SIGNIFICANT CHANGE UP (ref 0.2–1.2)
BUN SERPL-MCNC: 10 MG/DL — SIGNIFICANT CHANGE UP (ref 7–23)
CALCIUM SERPL-MCNC: 8.6 MG/DL — SIGNIFICANT CHANGE UP (ref 8.4–10.5)
CHLORIDE SERPL-SCNC: 106 MMOL/L — SIGNIFICANT CHANGE UP (ref 98–107)
CO2 SERPL-SCNC: 24 MMOL/L — SIGNIFICANT CHANGE UP (ref 22–31)
CREAT SERPL-MCNC: 0.88 MG/DL — SIGNIFICANT CHANGE UP (ref 0.5–1.3)
CULTURE - ACID FAST SMEAR CONCENTRATED: SIGNIFICANT CHANGE UP
GLUCOSE SERPL-MCNC: 153 MG/DL — HIGH (ref 70–99)
HCT VFR BLD CALC: 38.7 % — LOW (ref 39–50)
HGB BLD-MCNC: 13.4 G/DL — SIGNIFICANT CHANGE UP (ref 13–17)
MCHC RBC-ENTMCNC: 31.8 PG — SIGNIFICANT CHANGE UP (ref 27–34)
MCHC RBC-ENTMCNC: 34.6 % — SIGNIFICANT CHANGE UP (ref 32–36)
MCV RBC AUTO: 91.7 FL — SIGNIFICANT CHANGE UP (ref 80–100)
NRBC # FLD: 0 K/UL — SIGNIFICANT CHANGE UP (ref 0–0)
PLATELET # BLD AUTO: 153 K/UL — SIGNIFICANT CHANGE UP (ref 150–400)
PMV BLD: 10.9 FL — SIGNIFICANT CHANGE UP (ref 7–13)
POTASSIUM SERPL-MCNC: 3.6 MMOL/L — SIGNIFICANT CHANGE UP (ref 3.5–5.3)
POTASSIUM SERPL-SCNC: 3.6 MMOL/L — SIGNIFICANT CHANGE UP (ref 3.5–5.3)
PROT SERPL-MCNC: 6.4 G/DL — SIGNIFICANT CHANGE UP (ref 6–8.3)
RBC # BLD: 4.22 M/UL — SIGNIFICANT CHANGE UP (ref 4.2–5.8)
RBC # FLD: 12.8 % — SIGNIFICANT CHANGE UP (ref 10.3–14.5)
SODIUM SERPL-SCNC: 141 MMOL/L — SIGNIFICANT CHANGE UP (ref 135–145)
SPECIMEN SOURCE: SIGNIFICANT CHANGE UP
WBC # BLD: 6.68 K/UL — SIGNIFICANT CHANGE UP (ref 3.8–10.5)
WBC # FLD AUTO: 6.68 K/UL — SIGNIFICANT CHANGE UP (ref 3.8–10.5)

## 2019-05-04 PROCEDURE — 99233 SBSQ HOSP IP/OBS HIGH 50: CPT | Mod: GC

## 2019-05-04 RX ORDER — INSULIN GLARGINE 100 [IU]/ML
15 INJECTION, SOLUTION SUBCUTANEOUS
Qty: 5 | Refills: 0
Start: 2019-05-04 | End: 2019-06-02

## 2019-05-04 RX ORDER — AZTREONAM 2 G
1 VIAL (EA) INJECTION
Qty: 14 | Refills: 0
Start: 2019-05-04 | End: 2019-05-10

## 2019-05-04 RX ORDER — ATORVASTATIN CALCIUM 80 MG/1
1 TABLET, FILM COATED ORAL
Qty: 30 | Refills: 0
Start: 2019-05-04 | End: 2019-06-02

## 2019-05-04 RX ORDER — INSULIN LISPRO 100/ML
5 VIAL (ML) SUBCUTANEOUS
Qty: 5 | Refills: 0
Start: 2019-05-04 | End: 2019-06-02

## 2019-05-04 RX ORDER — ATORVASTATIN CALCIUM 80 MG/1
1 TABLET, FILM COATED ORAL
Qty: 0 | Refills: 0 | COMMUNITY
Start: 2019-05-04

## 2019-05-04 RX ADMIN — Medication 5 UNIT(S): at 09:18

## 2019-05-04 RX ADMIN — PIPERACILLIN AND TAZOBACTAM 25 GRAM(S): 4; .5 INJECTION, POWDER, LYOPHILIZED, FOR SOLUTION INTRAVENOUS at 06:30

## 2019-05-04 RX ADMIN — Medication 1: at 13:46

## 2019-05-04 RX ADMIN — Medication 81 MILLIGRAM(S): at 13:46

## 2019-05-04 RX ADMIN — Medication 5 UNIT(S): at 18:23

## 2019-05-04 RX ADMIN — SODIUM CHLORIDE 82 MILLILITER(S): 9 INJECTION INTRAMUSCULAR; INTRAVENOUS; SUBCUTANEOUS at 10:11

## 2019-05-04 RX ADMIN — PIPERACILLIN AND TAZOBACTAM 25 GRAM(S): 4; .5 INJECTION, POWDER, LYOPHILIZED, FOR SOLUTION INTRAVENOUS at 13:44

## 2019-05-04 RX ADMIN — Medication 5 UNIT(S): at 13:46

## 2019-05-04 RX ADMIN — Medication 300 MILLIGRAM(S): at 10:11

## 2019-05-04 NOTE — DISCHARGE NOTE NURSING/CASE MANAGEMENT/SOCIAL WORK - NSDCPNINST_GEN_ALL_CORE
Patient a&ox4, VSS, no acute distress noted. Partial amputation to left foot 2nd toe, dressing done by podiatry. No c/o of pain noted at this time. Patient d/c home Patient a&ox4, VSS, no acute distress noted. Partial amputation to left foot 2nd toe, dressing done by podiatry. No c/o of pain noted at this time. Educational information on Bactrim provided to patient. Discharge instructions went over with patient and wife, with understanding and teach back of insulin administration

## 2019-05-04 NOTE — PROGRESS NOTE ADULT - PROBLEM SELECTOR PLAN 5
Diet: DASH/Carb consistent  DVT ppx: Hep subq   RCRI Score: 2

## 2019-05-04 NOTE — DISCHARGE NOTE NURSING/CASE MANAGEMENT/SOCIAL WORK - NSDCDPATPORTLINK_GEN_ALL_CORE
You can access the [facility], offered by [facility], by registering with the following website: [email or web address]

## 2019-05-04 NOTE — PROGRESS NOTE ADULT - ASSESSMENT
75 y/o M s/p left foot partial 2nd toe amputation (DOS 5/3/19)  -Pt seen and evaluated, POD #1  -Sutures intact, no dehiscence, no hematoma, no signs of infection  -Prelim OR cultures negative and based on OR findings, low concern for residual infection  -Podiatry stable for discharge  -Recommend PO Bactrim x7 days  -Keep dressing clean, dry, and intact until follow up  -WBAT in surgical shoe to L foot  -Follow up with Dr. Orr in 1 week of discharge. Call [phone] to make an appointment.   -Discussed w/ attending

## 2019-05-04 NOTE — PROGRESS NOTE ADULT - SUBJECTIVE AND OBJECTIVE BOX
Podiatry pager #: [phone] ([place])/ [phone] ([facility])    Patient is a 74y old  Male who presents with a chief complaint of OM (04 May 2019 07:57)       INTERVAL HPI/OVERNIGHT EVENTS:  Patient seen and evaluated at bedside.  Pt is resting comfortable in NAD. Denies N/V/F/C.     Allergies    No Known Allergies    Intolerances        Vital Signs Last 24 Hrs  T(C): 36.8 (04 May 2019 14:33), Max: 37 (03 May 2019 16:30)  T(F): 98.2 (04 May 2019 14:33), Max: 98.6 (03 May 2019 16:30)  HR: 80 (04 May 2019 14:33) (60 - 80)  BP: 147/76 (04 May 2019 14:33) (145/78 - 153/77)  BP(mean): --  RR: 17 (04 May 2019 14:33) (17 - 19)  SpO2: 99% (04 May 2019 14:33) (98% - 100%)    LABS:                        13.4   6.68  )-----------( 153      ( 04 May 2019 06:00 )             38.7     05-04    141  |  106  |  10  ----------------------------<  153<H>  3.6   |  24  |  0.88    Ca    8.6      04 May 2019 06:00    TPro  6.4  /  Alb  3.3  /  TBili  0.5  /  DBili  x   /  AST  27  /  ALT  27  /  AlkPhos  48  05-04    PT/INR - ( 03 May 2019 05:47 )   PT: 12.1 SEC;   INR: 1.06          PTT - ( 03 May 2019 05:47 )  PTT:29.4 SEC    CAPILLARY BLOOD GLUCOSE      POCT Blood Glucose.: 179 mg/dL (04 May 2019 12:54)  POCT Blood Glucose.: 149 mg/dL (04 May 2019 08:40)  POCT Blood Glucose.: 204 mg/dL (03 May 2019 22:29)  POCT Blood Glucose.: 156 mg/dL (03 May 2019 17:00)      Lower Extremity Physical Exam:  s/p left foot partial 2nd toe amputation  sutures intact, no dehiscence, no hematoma, no signs of infection    RADIOLOGY & ADDITIONAL TESTS:  < from: Xray Foot AP + Lateral + Oblique, Left (05.03.19 @ 15:37) >    EXAM:  RAD FOOT MIN 3 VIEWS LT        PROCEDURE DATE:  May  3 2019         INTERPRETATION:  CLINICAL INDICATION: baseline postoperative evaluation   status post partial left 2nd toe amputation for infection    EXAM:  Portable frontal oblique and lateral left foot from 5/3/2019 at 1537.   Compared to prior study from 4/30/2019.    IMPRESSION:  Status post partial left 2nd toe amputation through distal aspect of   proximal phalanx with sharp and smooth osseous stump margin and with post   surgical change in the overlying soft tissues.    No proximally tracking gas collections beyond the amputation margins and   no focal areas of osteomyelitis.    Remainder of foot unchanged. However correlate with intraoperative   findings.                  ANGY HARRIS M.D., ATTENDING RADIOLOGIST  This document has been electronically signed. May  3 2019  5:18PM                  < end of copied text >

## 2019-05-04 NOTE — PROGRESS NOTE ADULT - ASSESSMENT
73 y/o M PMH CAD s/p 4 stents (2009) that failed and subsequent CABG (2009), was sent in from his podiatrist's office for 2nd L toe infection and now found to have OM, s/p amputation on 5/3.

## 2019-05-04 NOTE — PROGRESS NOTE ADULT - ATTENDING COMMENTS
Patient medically optimized for discharge.  Discharge planning 40 minutes - discussed with patient and consultants.  Marcelino Lewis MD  Pager# [phone]
Marcelino Lewis MD  pager# [phone]
Marcelino Lewis MD  pager# [phone]
Marcelino Lewis MD  pager# [phone]

## 2019-05-04 NOTE — PROGRESS NOTE ADULT - SUBJECTIVE AND OBJECTIVE BOX
ANESTHESIA POSTOP CHECK    74y Male POSTOP DAY 1 S/P 2nd toe amp    Vital Signs Last 24 Hrs  T(C): 36.7 (04 May 2019 05:57), Max: 37 (03 May 2019 16:30)  T(F): 98.1 (04 May 2019 05:57), Max: 98.6 (03 May 2019 16:30)  HR: 60 (04 May 2019 05:57) (60 - 69)  BP: 147/72 (04 May 2019 05:57) (120/51 - 166/68)  BP(mean): 72 (03 May 2019 15:45) (72 - 82)  RR: 18 (04 May 2019 05:57) (14 - 19)  SpO2: 99% (04 May 2019 05:57) (95% - 100%)  I&O's Summary    03 May 2019 07:01  -  04 May 2019 07:00  --------------------------------------------------------  IN: 1184 mL / OUT: 1800 mL / NET: -616 mL        [x ] NO APPARENT ANESTHESIA COMPLICATIONS

## 2019-05-04 NOTE — PROGRESS NOTE ADULT - PROBLEM SELECTOR PLAN 4
-c/w home metoprolol ER 100mg qd

## 2019-05-04 NOTE — PROGRESS NOTE ADULT - SUBJECTIVE AND OBJECTIVE BOX
DEONTE PATTERSON  74y  Male      Patient is a 74y old  Male who presents with a chief complaint of osteomyelitis (03 May 2019 08:31)      INTERVAL HPI/OVERNIGHT EVENTS:  -pt underwent 2nd toe amputations, tolerated procedure well  -    Medications:  acetaminophen   Tablet .. 650 milliGRAM(s) Oral every 6 hours PRN  acetaminophen   Tablet .. 650 milliGRAM(s) Oral every 6 hours PRN  aspirin  chewable 81 milliGRAM(s) Oral daily  atorvastatin 40 milliGRAM(s) Oral at bedtime  dextrose 40% Gel 15 Gram(s) Oral once PRN  dextrose 5%. 1000 milliLiter(s) IV Continuous <Continuous>  dextrose 50% Injectable 12.5 Gram(s) IV Push once  dextrose 50% Injectable 25 Gram(s) IV Push once  dextrose 50% Injectable 25 Gram(s) IV Push once  glucagon  Injectable 1 milliGRAM(s) IntraMuscular once PRN  insulin glargine Injectable (LANTUS) 15 Unit(s) SubCutaneous at bedtime  insulin lispro (HumaLOG) corrective regimen sliding scale   SubCutaneous three times a day before meals  insulin lispro (HumaLOG) corrective regimen sliding scale   SubCutaneous at bedtime  insulin lispro Injectable (HumaLOG) 5 Unit(s) SubCutaneous three times a day with meals  oxyCODONE    5 mG/acetaminophen 325 mG 1 Tablet(s) Oral every 4 hours PRN  piperacillin/tazobactam IVPB. 3.375 Gram(s) IV Intermittent every 8 hours  sodium chloride 0.9%. 1000 milliLiter(s) IV Continuous <Continuous>  vancomycin  IVPB 1500 milliGRAM(s) IV Intermittent every 12 hours    T(C): 36.7 (05-04-19 @ 05:57), Max: 37 (05-03-19 @ 16:30)  HR: 60 (05-04-19 @ 05:57) (60 - 69)  BP: 147/72 (05-04-19 @ 05:57) (120/51 - 166/68)  RR: 18 (05-04-19 @ 05:57) (14 - 19)  SpO2: 99% (05-04-19 @ 05:57) (95% - 100%)  Wt(kg): --Vital Signs Last 24 Hrs  T(C): 36.7 (04 May 2019 05:57), Max: 37 (03 May 2019 16:30)  T(F): 98.1 (04 May 2019 05:57), Max: 98.6 (03 May 2019 16:30)  HR: 60 (04 May 2019 05:57) (60 - 69)  BP: 147/72 (04 May 2019 05:57) (120/51 - 166/68)  BP(mean): 72 (03 May 2019 15:45) (72 - 82)  RR: 18 (04 May 2019 05:57) (14 - 19)  SpO2: 99% (04 May 2019 05:57) (95% - 100%)    PHYSICAL EXAM:          Consultant(s) Notes Reviewed:  [x ] YES  [ ] NO  Care Discussed with Consultants/Other Providers [ x] YES  [ ] NO    LABS:                        13.4   6.68  )-----------( 153      ( 04 May 2019 06:00 )             38.7     05-04    141  |  106  |  10  ----------------------------<  153<H>  3.6   |  24  |  0.88    Ca    8.6      04 May 2019 06:00    TPro  6.4  /  Alb  3.3  /  TBili  0.5  /  DBili  x   /  AST  27  /  ALT  27  /  AlkPhos  48  05-04    PT/INR - ( 03 May 2019 05:47 )   PT: 12.1 SEC;   INR: 1.06        PTT - ( 03 May 2019 05:47 )  PTT:29.4 SEC    CAPILLARY BLOOD GLUCOSE  POCT Blood Glucose.: 204 mg/dL (03 May 2019 22:29)  POCT Blood Glucose.: 156 mg/dL (03 May 2019 17:00)  POCT Blood Glucose.: 165 mg/dL (03 May 2019 15:17)  POCT Blood Glucose.: 180 mg/dL (03 May 2019 12:48)  POCT Blood Glucose.: 167 mg/dL (03 May 2019 08:49) DEONTE PATTERSON  74y  Male      Patient is a 74y old  Male who presents with a chief complaint of osteomyelitis (03 May 2019 08:31)      INTERVAL HPI/OVERNIGHT EVENTS:  -pt underwent 2nd toe amputations, tolerated procedure well  -pt reports feeling well after surgery, able to ambulate with supportive shoe  -pt denies fevers, chills, palpitations, chest pain, SOB, abd pain, n/v/d/c     Medications:  acetaminophen   Tablet .. 650 milliGRAM(s) Oral every 6 hours PRN  acetaminophen   Tablet .. 650 milliGRAM(s) Oral every 6 hours PRN  aspirin  chewable 81 milliGRAM(s) Oral daily  atorvastatin 40 milliGRAM(s) Oral at bedtime  dextrose 40% Gel 15 Gram(s) Oral once PRN  dextrose 5%. 1000 milliLiter(s) IV Continuous <Continuous>  dextrose 50% Injectable 12.5 Gram(s) IV Push once  dextrose 50% Injectable 25 Gram(s) IV Push once  dextrose 50% Injectable 25 Gram(s) IV Push once  glucagon  Injectable 1 milliGRAM(s) IntraMuscular once PRN  insulin glargine Injectable (LANTUS) 15 Unit(s) SubCutaneous at bedtime  insulin lispro (HumaLOG) corrective regimen sliding scale   SubCutaneous three times a day before meals  insulin lispro (HumaLOG) corrective regimen sliding scale   SubCutaneous at bedtime  insulin lispro Injectable (HumaLOG) 5 Unit(s) SubCutaneous three times a day with meals  oxyCODONE    5 mG/acetaminophen 325 mG 1 Tablet(s) Oral every 4 hours PRN  piperacillin/tazobactam IVPB. 3.375 Gram(s) IV Intermittent every 8 hours  sodium chloride 0.9%. 1000 milliLiter(s) IV Continuous <Continuous>  vancomycin  IVPB 1500 milliGRAM(s) IV Intermittent every 12 hours    T(C): 36.7 (05-04-19 @ 05:57), Max: 37 (05-03-19 @ 16:30)  HR: 60 (05-04-19 @ 05:57) (60 - 69)  BP: 147/72 (05-04-19 @ 05:57) (120/51 - 166/68)  RR: 18 (05-04-19 @ 05:57) (14 - 19)  SpO2: 99% (05-04-19 @ 05:57) (95% - 100%)  Wt(kg): --Vital Signs Last 24 Hrs  T(C): 36.7 (04 May 2019 05:57), Max: 37 (03 May 2019 16:30)  T(F): 98.1 (04 May 2019 05:57), Max: 98.6 (03 May 2019 16:30)  HR: 60 (04 May 2019 05:57) (60 - 69)  BP: 147/72 (04 May 2019 05:57) (120/51 - 166/68)  BP(mean): 72 (03 May 2019 15:45) (72 - 82)  RR: 18 (04 May 2019 05:57) (14 - 19)  SpO2: 99% (04 May 2019 05:57) (95% - 100%)    PHYSICAL EXAM:  General: NAD  Neuro: AAOx3. No FND.  HEENT: Normocephalic, atraumatic. EOMI. PERRL. Conjunctiva and sclera clear. Mucous membranes moist, without lesions. Neck supple. No JVD.  Resp: Non-labored breathing. Clear to auscultation bilaterally; no rales, rhonchi, or wheezing  Cardiovascular: Regular rate and rhythm; no murmurs, rubs, or gallops.   Abd: +BS, soft, nontender, obese but nondistended  Ext:  L foot wrapped in bandage, mild serosanguinous drainage from left second toe, L foot erythematous. No LE edema  Skin: Warm, dry    Consultant(s) Notes Reviewed:  [x ] YES  [ ] NO  Care Discussed with Consultants/Other Providers [ x] YES  [ ] NO    LABS:                        13.4   6.68  )-----------( 153      ( 04 May 2019 06:00 )             38.7     05-04    141  |  106  |  10  ----------------------------<  153<H>  3.6   |  24  |  0.88    Ca    8.6      04 May 2019 06:00    TPro  6.4  /  Alb  3.3  /  TBili  0.5  /  DBili  x   /  AST  27  /  ALT  27  /  AlkPhos  48  05-04    PT/INR - ( 03 May 2019 05:47 )   PT: 12.1 SEC;   INR: 1.06        PTT - ( 03 May 2019 05:47 )  PTT:29.4 SEC    CAPILLARY BLOOD GLUCOSE  POCT Blood Glucose.: 204 mg/dL (03 May 2019 22:29)  POCT Blood Glucose.: 156 mg/dL (03 May 2019 17:00)  POCT Blood Glucose.: 165 mg/dL (03 May 2019 15:17)  POCT Blood Glucose.: 180 mg/dL (03 May 2019 12:48)  POCT Blood Glucose.: 167 mg/dL (03 May 2019 08:49)

## 2019-05-04 NOTE — PHYSICAL THERAPY INITIAL EVALUATION ADULT - PERTINENT HX OF CURRENT PROBLEM, REHAB EVAL
73 y/o M PMH CAD s/p 4 stents (2009) that failed and subsequent CABG (2009) was sent in from his podiatrist's office for 2nd L toe that was injured after cutting his toe nail 2 weeks ago.  Xray of L 2nd toe to have OM.

## 2019-05-05 LAB
BACTERIA BLD CULT: SIGNIFICANT CHANGE UP
BACTERIA BLD CULT: SIGNIFICANT CHANGE UP

## 2019-05-05 RX ORDER — INSULIN ASPART 100 [IU]/ML
10 INJECTION, SOLUTION SUBCUTANEOUS
Qty: 10 | Refills: 0
Start: 2019-05-05 | End: 2019-06-03

## 2019-05-06 LAB
GRAM STN WND: SIGNIFICANT CHANGE UP
METHOD TYPE: SIGNIFICANT CHANGE UP
ORGANISM # SPEC MICROSCOPIC CNT: SIGNIFICANT CHANGE UP

## 2019-05-07 LAB
-  AMIKACIN: SIGNIFICANT CHANGE UP
-  AMPICILLIN/SULBACTAM: SIGNIFICANT CHANGE UP
-  AMPICILLIN: SIGNIFICANT CHANGE UP
-  AZTREONAM: SIGNIFICANT CHANGE UP
-  CEFAZOLIN: SIGNIFICANT CHANGE UP
-  CEFAZOLIN: SIGNIFICANT CHANGE UP
-  CEFEPIME: SIGNIFICANT CHANGE UP
-  CEFOXITIN: SIGNIFICANT CHANGE UP
-  CEFTAZIDIME: SIGNIFICANT CHANGE UP
-  CEFTRIAXONE: SIGNIFICANT CHANGE UP
-  CIPROFLOXACIN: SIGNIFICANT CHANGE UP
-  CIPROFLOXACIN: SIGNIFICANT CHANGE UP
-  CLINDAMYCIN: SIGNIFICANT CHANGE UP
-  ERTAPENEM: SIGNIFICANT CHANGE UP
-  ERYTHROMYCIN: SIGNIFICANT CHANGE UP
-  GENTAMICIN: SIGNIFICANT CHANGE UP
-  GENTAMICIN: SIGNIFICANT CHANGE UP
-  IMIPENEM: SIGNIFICANT CHANGE UP
-  LEVOFLOXACIN: SIGNIFICANT CHANGE UP
-  LEVOFLOXACIN: SIGNIFICANT CHANGE UP
-  MEROPENEM: SIGNIFICANT CHANGE UP
-  MOXIFLOXACIN(AEROBIC): SIGNIFICANT CHANGE UP
-  OXACILLIN: SIGNIFICANT CHANGE UP
-  PENICILLIN: SIGNIFICANT CHANGE UP
-  PIPERACILLIN/TAZOBACTAM: SIGNIFICANT CHANGE UP
-  RIFAMPIN.: SIGNIFICANT CHANGE UP
-  TETRACYCLINE: SIGNIFICANT CHANGE UP
-  TIGECYCLINE: SIGNIFICANT CHANGE UP
-  TOBRAMYCIN: SIGNIFICANT CHANGE UP
-  TRIMETHOPRIM/SULFAMETHOXAZOLE: SIGNIFICANT CHANGE UP
-  TRIMETHOPRIM/SULFAMETHOXAZOLE: SIGNIFICANT CHANGE UP
-  VANCOMYCIN: SIGNIFICANT CHANGE UP
METHOD TYPE: SIGNIFICANT CHANGE UP

## 2019-05-09 LAB — CULTURE - SURGICAL SITE: SIGNIFICANT CHANGE UP

## 2019-05-10 LAB — SPECIMEN SOURCE: SIGNIFICANT CHANGE UP

## 2019-06-14 LAB — ACID FAST STN SPEC: SIGNIFICANT CHANGE UP

## 2019-07-09 NOTE — ED PROVIDER NOTE - CROS ED RESP ALL NEG
Outpatient Instructions for Monitored Anesthesia Care (MAC)    1. You will be released from the hospital in the care of a responsible adult who should remain with you for at least 6 hours.    2. You are at an increased risk for falls following anesthesia. Use care when changing from a lying to a sitting position. Use your assistive devices ( example: cane, walker or family member).    3. You must NOT drive a car, climb high places such as a ladder, or operate equipment such as electric knives,stoves etc...for at least 12 hours. If you are dizzy for longer than 24 hours, notify your doctor.    4. DO NOT drink any alcoholic beverages for at least 24 hours. Anesthesia may impair your judgement.    5. If you smoke, do not smoke alone due to increased risk of burns/fires.    6. DO NOT undertake any legally binding commitment for at least 24 hours. Anesthesia may impair your judgement.    negative...

## 2019-11-08 RX ORDER — CEPHALEXIN 500 MG
1 CAPSULE ORAL
Qty: 0 | Refills: 0 | DISCHARGE
Start: 2019-11-08 | End: 2019-11-14

## 2019-11-12 ENCOUNTER — INPATIENT (INPATIENT)
Facility: HOSPITAL | Age: 74
LOS: 7 days | Discharge: HOME | DRG: 475 | End: 2019-11-20
Attending: HOSPITALIST | Admitting: HOSPITALIST
Payer: COMMERCIAL

## 2019-11-12 VITALS
HEART RATE: 69 BPM | HEIGHT: 70 IN | SYSTOLIC BLOOD PRESSURE: 175 MMHG | OXYGEN SATURATION: 99 % | DIASTOLIC BLOOD PRESSURE: 81 MMHG | TEMPERATURE: 98 F | WEIGHT: 203.93 LBS | RESPIRATION RATE: 16 BRPM

## 2019-11-12 DIAGNOSIS — I25.810 ATHEROSCLEROSIS OF CORONARY ARTERY BYPASS GRAFT(S) WITHOUT ANGINA PECTORIS: ICD-10-CM

## 2019-11-12 DIAGNOSIS — M86.60 OTHER CHRONIC OSTEOMYELITIS, UNSPECIFIED SITE: ICD-10-CM

## 2019-11-12 DIAGNOSIS — E11.59 TYPE 2 DIABETES MELLITUS WITH OTHER CIRCULATORY COMPLICATIONS: ICD-10-CM

## 2019-11-12 DIAGNOSIS — Z95.1 PRESENCE OF AORTOCORONARY BYPASS GRAFT: Chronic | ICD-10-CM

## 2019-11-12 DIAGNOSIS — I10 ESSENTIAL (PRIMARY) HYPERTENSION: ICD-10-CM

## 2019-11-12 DIAGNOSIS — E11.628 TYPE 2 DIABETES MELLITUS WITH OTHER SKIN COMPLICATIONS: ICD-10-CM

## 2019-11-12 DIAGNOSIS — Z29.9 ENCOUNTER FOR PROPHYLACTIC MEASURES, UNSPECIFIED: ICD-10-CM

## 2019-11-12 LAB
ALBUMIN SERPL ELPH-MCNC: 4.6 G/DL — SIGNIFICANT CHANGE UP (ref 3.3–5)
ALP SERPL-CCNC: 59 U/L — SIGNIFICANT CHANGE UP (ref 40–120)
ALT FLD-CCNC: 16 U/L — SIGNIFICANT CHANGE UP (ref 10–45)
ANION GAP SERPL CALC-SCNC: 12 MMOL/L — SIGNIFICANT CHANGE UP (ref 5–17)
APPEARANCE UR: CLEAR — SIGNIFICANT CHANGE UP
APTT BLD: 32.3 SEC — SIGNIFICANT CHANGE UP (ref 27.5–36.3)
AST SERPL-CCNC: 15 U/L — SIGNIFICANT CHANGE UP (ref 10–40)
BASOPHILS # BLD AUTO: 0.04 K/UL — SIGNIFICANT CHANGE UP (ref 0–0.2)
BASOPHILS NFR BLD AUTO: 0.5 % — SIGNIFICANT CHANGE UP (ref 0–2)
BILIRUB SERPL-MCNC: 0.4 MG/DL — SIGNIFICANT CHANGE UP (ref 0.2–1.2)
BILIRUB UR-MCNC: NEGATIVE — SIGNIFICANT CHANGE UP
BUN SERPL-MCNC: 18 MG/DL — SIGNIFICANT CHANGE UP (ref 7–23)
CALCIUM SERPL-MCNC: 9.8 MG/DL — SIGNIFICANT CHANGE UP (ref 8.4–10.5)
CHLORIDE SERPL-SCNC: 101 MMOL/L — SIGNIFICANT CHANGE UP (ref 96–108)
CO2 SERPL-SCNC: 27 MMOL/L — SIGNIFICANT CHANGE UP (ref 22–31)
COLOR SPEC: SIGNIFICANT CHANGE UP
CREAT SERPL-MCNC: 0.83 MG/DL — SIGNIFICANT CHANGE UP (ref 0.5–1.3)
CRP SERPL-MCNC: 0.62 MG/DL — HIGH (ref 0–0.4)
DIFF PNL FLD: ABNORMAL
EOSINOPHIL # BLD AUTO: 0.11 K/UL — SIGNIFICANT CHANGE UP (ref 0–0.5)
EOSINOPHIL NFR BLD AUTO: 1.3 % — SIGNIFICANT CHANGE UP (ref 0–6)
ERYTHROCYTE [SEDIMENTATION RATE] IN BLOOD: 38 MM/HR — HIGH (ref 0–20)
GLUCOSE SERPL-MCNC: 107 MG/DL — HIGH (ref 70–99)
GLUCOSE UR QL: NEGATIVE — SIGNIFICANT CHANGE UP
HCT VFR BLD CALC: 42.1 % — SIGNIFICANT CHANGE UP (ref 39–50)
HGB BLD-MCNC: 14.3 G/DL — SIGNIFICANT CHANGE UP (ref 13–17)
IMM GRANULOCYTES NFR BLD AUTO: 0.4 % — SIGNIFICANT CHANGE UP (ref 0–1.5)
INR BLD: 0.93 RATIO — SIGNIFICANT CHANGE UP (ref 0.88–1.16)
KETONES UR-MCNC: NEGATIVE — SIGNIFICANT CHANGE UP
LACTATE BLDV-MCNC: 2 MMOL/L — SIGNIFICANT CHANGE UP (ref 0.7–2)
LEUKOCYTE ESTERASE UR-ACNC: NEGATIVE — SIGNIFICANT CHANGE UP
LYMPHOCYTES # BLD AUTO: 1.98 K/UL — SIGNIFICANT CHANGE UP (ref 1–3.3)
LYMPHOCYTES # BLD AUTO: 23.7 % — SIGNIFICANT CHANGE UP (ref 13–44)
MCHC RBC-ENTMCNC: 30.8 PG — SIGNIFICANT CHANGE UP (ref 27–34)
MCHC RBC-ENTMCNC: 34 GM/DL — SIGNIFICANT CHANGE UP (ref 32–36)
MCV RBC AUTO: 90.7 FL — SIGNIFICANT CHANGE UP (ref 80–100)
MONOCYTES # BLD AUTO: 0.7 K/UL — SIGNIFICANT CHANGE UP (ref 0–0.9)
MONOCYTES NFR BLD AUTO: 8.4 % — SIGNIFICANT CHANGE UP (ref 2–14)
NEUTROPHILS # BLD AUTO: 5.48 K/UL — SIGNIFICANT CHANGE UP (ref 1.8–7.4)
NEUTROPHILS NFR BLD AUTO: 65.7 % — SIGNIFICANT CHANGE UP (ref 43–77)
NITRITE UR-MCNC: NEGATIVE — SIGNIFICANT CHANGE UP
NRBC # BLD: 0 /100 WBCS — SIGNIFICANT CHANGE UP (ref 0–0)
PH UR: 6 — SIGNIFICANT CHANGE UP (ref 5–8)
PLATELET # BLD AUTO: 191 K/UL — SIGNIFICANT CHANGE UP (ref 150–400)
POTASSIUM SERPL-MCNC: 3.9 MMOL/L — SIGNIFICANT CHANGE UP (ref 3.5–5.3)
POTASSIUM SERPL-SCNC: 3.9 MMOL/L — SIGNIFICANT CHANGE UP (ref 3.5–5.3)
PROT SERPL-MCNC: 8.4 G/DL — HIGH (ref 6–8.3)
PROT UR-MCNC: NEGATIVE — SIGNIFICANT CHANGE UP
PROTHROM AB SERPL-ACNC: 10.7 SEC — SIGNIFICANT CHANGE UP (ref 10–12.9)
RBC # BLD: 4.64 M/UL — SIGNIFICANT CHANGE UP (ref 4.2–5.8)
RBC # FLD: 13.2 % — SIGNIFICANT CHANGE UP (ref 10.3–14.5)
SODIUM SERPL-SCNC: 140 MMOL/L — SIGNIFICANT CHANGE UP (ref 135–145)
SP GR SPEC: 1.01 — SIGNIFICANT CHANGE UP (ref 1.01–1.02)
UROBILINOGEN FLD QL: NEGATIVE — SIGNIFICANT CHANGE UP
WBC # BLD: 8.34 K/UL — SIGNIFICANT CHANGE UP (ref 3.8–10.5)
WBC # FLD AUTO: 8.34 K/UL — SIGNIFICANT CHANGE UP (ref 3.8–10.5)

## 2019-11-12 PROCEDURE — 93010 ELECTROCARDIOGRAM REPORT: CPT | Mod: NC

## 2019-11-12 PROCEDURE — 73630 X-RAY EXAM OF FOOT: CPT | Mod: 26,RT

## 2019-11-12 PROCEDURE — 99285 EMERGENCY DEPT VISIT HI MDM: CPT

## 2019-11-12 PROCEDURE — 71045 X-RAY EXAM CHEST 1 VIEW: CPT | Mod: 26

## 2019-11-12 PROCEDURE — 99223 1ST HOSP IP/OBS HIGH 75: CPT

## 2019-11-12 RX ORDER — METOPROLOL TARTRATE 50 MG
100 TABLET ORAL DAILY
Refills: 0 | Status: DISCONTINUED | OUTPATIENT
Start: 2019-11-13 | End: 2019-11-14

## 2019-11-12 RX ORDER — INSULIN LISPRO 100/ML
VIAL (ML) SUBCUTANEOUS AT BEDTIME
Refills: 0 | Status: DISCONTINUED | OUTPATIENT
Start: 2019-11-12 | End: 2019-11-14

## 2019-11-12 RX ORDER — GLUCAGON INJECTION, SOLUTION 0.5 MG/.1ML
1 INJECTION, SOLUTION SUBCUTANEOUS ONCE
Refills: 0 | Status: DISCONTINUED | OUTPATIENT
Start: 2019-11-12 | End: 2019-11-14

## 2019-11-12 RX ORDER — DEXTROSE 50 % IN WATER 50 %
12.5 SYRINGE (ML) INTRAVENOUS ONCE
Refills: 0 | Status: DISCONTINUED | OUTPATIENT
Start: 2019-11-12 | End: 2019-11-14

## 2019-11-12 RX ORDER — ASPIRIN/CALCIUM CARB/MAGNESIUM 324 MG
81 TABLET ORAL DAILY
Refills: 0 | Status: DISCONTINUED | OUTPATIENT
Start: 2019-11-12 | End: 2019-11-14

## 2019-11-12 RX ORDER — SODIUM CHLORIDE 9 MG/ML
500 INJECTION INTRAMUSCULAR; INTRAVENOUS; SUBCUTANEOUS ONCE
Refills: 0 | Status: COMPLETED | OUTPATIENT
Start: 2019-11-12 | End: 2019-11-12

## 2019-11-12 RX ORDER — DEXTROSE 50 % IN WATER 50 %
25 SYRINGE (ML) INTRAVENOUS ONCE
Refills: 0 | Status: DISCONTINUED | OUTPATIENT
Start: 2019-11-12 | End: 2019-11-14

## 2019-11-12 RX ORDER — VANCOMYCIN HCL 1 G
1000 VIAL (EA) INTRAVENOUS EVERY 12 HOURS
Refills: 0 | Status: DISCONTINUED | OUTPATIENT
Start: 2019-11-12 | End: 2019-11-14

## 2019-11-12 RX ORDER — VANCOMYCIN HCL 1 G
1000 VIAL (EA) INTRAVENOUS ONCE
Refills: 0 | Status: COMPLETED | OUTPATIENT
Start: 2019-11-12 | End: 2019-11-12

## 2019-11-12 RX ORDER — INSULIN LISPRO 100/ML
VIAL (ML) SUBCUTANEOUS
Refills: 0 | Status: DISCONTINUED | OUTPATIENT
Start: 2019-11-12 | End: 2019-11-14

## 2019-11-12 RX ORDER — ACETAMINOPHEN 500 MG
650 TABLET ORAL EVERY 6 HOURS
Refills: 0 | Status: DISCONTINUED | OUTPATIENT
Start: 2019-11-12 | End: 2019-11-14

## 2019-11-12 RX ORDER — CEFEPIME 1 G/1
2000 INJECTION, POWDER, FOR SOLUTION INTRAMUSCULAR; INTRAVENOUS ONCE
Refills: 0 | Status: COMPLETED | OUTPATIENT
Start: 2019-11-12 | End: 2019-11-12

## 2019-11-12 RX ORDER — SODIUM CHLORIDE 9 MG/ML
1000 INJECTION, SOLUTION INTRAVENOUS
Refills: 0 | Status: DISCONTINUED | OUTPATIENT
Start: 2019-11-12 | End: 2019-11-14

## 2019-11-12 RX ORDER — ENOXAPARIN SODIUM 100 MG/ML
40 INJECTION SUBCUTANEOUS DAILY
Refills: 0 | Status: COMPLETED | OUTPATIENT
Start: 2019-11-12 | End: 2019-11-13

## 2019-11-12 RX ORDER — DEXTROSE 50 % IN WATER 50 %
15 SYRINGE (ML) INTRAVENOUS ONCE
Refills: 0 | Status: DISCONTINUED | OUTPATIENT
Start: 2019-11-12 | End: 2019-11-14

## 2019-11-12 RX ORDER — CEFEPIME 1 G/1
2000 INJECTION, POWDER, FOR SOLUTION INTRAMUSCULAR; INTRAVENOUS EVERY 12 HOURS
Refills: 0 | Status: DISCONTINUED | OUTPATIENT
Start: 2019-11-12 | End: 2019-11-13

## 2019-11-12 RX ADMIN — Medication 250 MILLIGRAM(S): at 18:55

## 2019-11-12 RX ADMIN — SODIUM CHLORIDE 500 MILLILITER(S): 9 INJECTION INTRAMUSCULAR; INTRAVENOUS; SUBCUTANEOUS at 18:20

## 2019-11-12 RX ADMIN — CEFEPIME 100 MILLIGRAM(S): 1 INJECTION, POWDER, FOR SOLUTION INTRAMUSCULAR; INTRAVENOUS at 18:21

## 2019-11-12 NOTE — H&P ADULT - PROBLEM SELECTOR PLAN 1
RIGHT forefoot.   See above.   IV antibiotics as above.  Would consider formal ID evaluation in the AM.

## 2019-11-12 NOTE — ED PROVIDER NOTE - OBJECTIVE STATEMENT
75 y/o M PMHx of DM on metformin, HTN, CAD s/p CABG, prior L foot infx s/p 2nd distal phalanx amputation, s/p recent infx of R 2nd phalanx s/p distal phalanx amputation 9/24 presents c/o foot redness, swelling, and pain. Pain is minimal at rest, worse with ambulation, no pain meds taken today. Pain began 9/28, originally radiated to the knee, but pt denies recent radiation of pain. Pain described as tight and throbbing. Pt notes increased redness yesterday. Had MRI outpt yesterday showing infx and pt was referred to ED for evaluation. Pt believes he has been on abx for the past 6 days, unable to remember the name. Pt denies CP, SOB, hemoptysis, leg pain/swelling, h/o DVT/PE, fever, chills, numbness, paresthesias, weakness, difficulty moving the foot/ankle.  Podiatry: Dr. Orr

## 2019-11-12 NOTE — H&P ADULT - NSHPREVIEWOFSYSTEMS_GEN_ALL_CORE
No fever, no chills, no rigors.  No HA< no focal weakness.  No chest pain/pressure.   NO palpitations.  NO abdominal pain, no red blood per rectum or melena.  No back pain, no tearing back pain.    NO dysuria, no hematuria.  NO weight loss or anorexia.  NO SI/HI.  NO thyroid symptoms.  NO node swelling.

## 2019-11-12 NOTE — ED PROVIDER NOTE - PROGRESS NOTE DETAILS
Podiatry resident evaluated pt at bedside, requesting admission to hospitalist, pt currently on the schedule for procedure on 11/15 but they will try moving pt sooner. Requesting vanc + cefepime, podiatry cultured the area and sent to lab. -Carlos Manuel Rutledge PA-C

## 2019-11-12 NOTE — H&P ADULT - NSHPPHYSICALEXAM_GEN_ALL_CORE
Physical exam with an elderly M appears younger than stated age.    Afebrile.   HR  65  RR 14.  BP  135/75  100% on RA    HEENT< PERRL< EOMI, oropharynx clear  Neck supple  No thyromegaly  Chest clear  Cor s1 s2 healed sternotomy scar.  Abdomen soft nontender, normal bowel sounds, no rebound  Skin dry.  Ext with LEFT 2nd toe amputation stump c/d/i.  RIGHT forefoot with dressing c/d/i.  Poor nail hygiene.  Neurologic exam AxOx3.  Speech fluent.  cognition intact.  UE/LE 5/5.  NO SI/HI.

## 2019-11-12 NOTE — ED PROVIDER NOTE - ATTENDING CONTRIBUTION TO CARE
Pt with RIGHT foot red, swollen,, drainage from 2nd digit stump 2d ago, on antibiotics oral for one week without much improvement sent for MRI showing infection of foot and likely OR intervention.  No fever, vomiting, cp, sob.  Appears well, nontoxic.

## 2019-11-12 NOTE — H&P ADULT - NSHPSOCIALHISTORY_GEN_ALL_CORE
NO ethanol.  Quit cigarettes in 2007 following CABG with 1 pack /day since age 16.   Supportive spouse--patient did not wish for examiner to contact spouse at this hour.

## 2019-11-12 NOTE — ED PROVIDER NOTE - PHYSICAL EXAMINATION
GEN: Pt in NAD, A&O x3.  PSYCH: Affect appropriate.  EYES: Sclera white w/o injection.   RESP: No chest wall tenderness, CTA b/l, no wheezes, rales, or rhonchi.   CARDIAC: RRR, clear distinct S1, S2, no appreciable murmurs.  VASC: 2+ dorsalis pedis pulses b/l. 1+ R sided pedal edema, no calf tenderness b/l.  MSK: No obvious joint erythema or deformity. Edema of R foot, mild ttp. FROM b/l LE w/o pain.  NEURO: Normal and equal sensation and 5/5 strength b/l LE.  SKIN: Erythema of R foot extending to ankle, no streaking, no crepitus. s/p distal phalanx amputation 2nd digit b/l. +Maceration interdigital spaces R foot, no obvious skin breaks or DC. Warmth of R foot. L foot normal color for race.

## 2019-11-12 NOTE — ED PROVIDER NOTE - CARE PLAN
Principal Discharge DX:	Diabetic foot infection  Secondary Diagnosis:	Failure of outpatient treatment

## 2019-11-12 NOTE — H&P ADULT - ATTENDING COMMENTS
NIGHT HOSPITALIST:   Patient / spouse aware of course and agree with plan/care as above.  Emotional support provided to patient.   Care reviewed with covering house staff.   Care assumed by the DAY PROVIDER.    Biju Keller MD  [phone] NIGHT HOSPITALIST:   Patient / spouse aware of course and agree with plan/care as above.  Emotional support provided to patient.   Care reviewed with covering house staff, MAR.   Care assumed by the DAY PROVIDER.    Biju Keller MD  [phone]

## 2019-11-12 NOTE — H&P ADULT - HISTORY OF PRESENT ILLNESS
NIGHT HOSPITALIST:   Patient UNKNOWN to me previously, assigned to me at this point via the ER to admit this independent 73 y/o M--followed by Dr. Zuniga in the office but apparently was seeing a podiatrist in [place] in the setting of this patient with a CABG in 2007, essential HTN, apparently recently diagnosed DM2 following a Hgb A1C following a routine blood test during foot procedures, with past LEFT 2nd toe amputation following infection, with patient with an amputation of the RIGHT 2nd toe on Sept 24 2019 in [place] but with persistent pain, swelling in the RIGHT forefoot, but worsening for the past 6 days, with an outpatient MRI (provided by patient to podiatry in the ER) with apparent osteomyelitis.  Patient had been on oral antibiotics with no improvement.  Patient denies fever, chills, rigors at present.  NO foot pain.

## 2019-11-12 NOTE — CONSULT NOTE ADULT - ASSESSMENT
75 yo M w/ RF 2nd toe osteomyelitis and cellulitis to midfoot   - Pt seen and evaluated in ED  - Vitals stable  - erythema and edema of R fore foot, RF partal 2nd toe amp site with pin point opening, serous drainage and crusts noted.  - XR ordered  - start Vanco + Cefepime- Rec ID c/s for abx management   - Pt tentatively booked for RF partial 2nd ray resection on Fri 11/15 at 2:30 pm. Will try to reschedule to Thurs  - Please document medical optimization for local anesthesia with sedation.   - Pod will follow  - D/w attending

## 2019-11-12 NOTE — CONSULT NOTE ADULT - SUBJECTIVE AND OBJECTIVE BOX
Podiatry pager #: [phone] Pager:  [phone] [facility] Pager: [phone]    Patient is a 74y old  Male who presents with a chief complaint of  R foot pain     HPI: Pt presents for RF infection. Pt was seen by Dr. Orr (Podiatrist) who sent him in for surgery. Had outpatient MRI + for OM of 2nd met head and digit. Has been on Clindamycin since Thursday. Denies any f/n/v/chills.       PAST MEDICAL & SURGICAL HISTORY:  Coronary artery disease  Hypertension  Diabetes  S/P CABG (coronary artery bypass graft)      MEDICATIONS  (STANDING):  cefepime   IVPB 2000 milliGRAM(s) IV Intermittent once  sodium chloride 0.9% Bolus 500 milliLiter(s) IV Bolus once  vancomycin  IVPB 1000 milliGRAM(s) IV Intermittent once    MEDICATIONS  (PRN):      Allergies    No Known Allergies    Intolerances        VITALS:    Vital Signs Last 24 Hrs  T(C): 36.5 (12 Nov 2019 17:15), Max: 36.5 (12 Nov 2019 17:15)  T(F): 97.7 (12 Nov 2019 17:15), Max: 97.7 (12 Nov 2019 17:15)  HR: 69 (12 Nov 2019 17:15) (69 - 69)  BP: 175/81 (12 Nov 2019 17:15) (175/81 - 175/81)  BP(mean): --  RR: 16 (12 Nov 2019 17:15) (16 - 16)  SpO2: 99% (12 Nov 2019 17:15) (99% - 99%)    LABS:                CAPILLARY BLOOD GLUCOSE              LOWER EXTREMITY PHYSICAL EXAM:    Vascular: DP/PT 1/4, B/L, CFT <3seconds B/L, Temperature gradient warm to cool, B/L.   Neuro: Epicritic sensation diminished to the level of digits, B/L.  Musculoskeletal/Ortho: s/p partial 2nd toe amp b/l  Skin: erythema and edema of R fore foot, RF partal 2nd toe amp site with pin point opening, serous drainage and crusts noted.          RADIOLOGY & ADDITIONAL STUDIES:  XR pending

## 2019-11-12 NOTE — H&P ADULT - ASSESSMENT
NIGHT HOSPITALIST:  Suspected osteo of the RIGHT fore foot NIGHT HOSPITALIST:  Suspected osteo of the RIGHT fore foot 2nd and 3rd digits--appreciate podiatry evaluation.   Will continue with IV antibiotics as above and would consider formal ID evaluation in the AM.  Echo ordered for patient.  Would consider contacting patient's PCP / cardiologist at [facility] in the AM of patient's hospitalization.    Will HOLD metformin with FS S/S for now, and consider low dose Lantus tomorrow at bedtime.

## 2019-11-12 NOTE — H&P ADULT - NSHPLABSRESULTS_GEN_ALL_CORE
WBC 8.3.  hgb 14.3.  Platelets of 191K>    INR 0.9.    K+ 3.9.    Random glucose of 107.  Cr 0.8    alb 4.6.    Lactate 2.0.    U/A negative.  Chest radiograph reviewed with no infiltrate or effusion. WBC 8.3.  hgb 14.3.  Platelets of 191K>    INR 0.9.    K+ 3.9.    Random glucose of 107.  Cr 0.8    alb 4.6.    Lactate 2.0.    U/A negative.  Chest radiograph reviewed with no infiltrate or effusion.    RIGHT foot radiograph with S/P 2nd toe amputation to the mid prox phalanx with cortical erosions 2nd and 3rd digits. WBC 8.3.  hgb 14.3.  Platelets of 191K>    INR 0.9.    K+ 3.9.    Random glucose of 107.  Cr 0.8    alb 4.6.    Lactate 2.0.    U/A negative.  Chest radiograph reviewed with no infiltrate or effusion.    RIGHT foot radiograph with S/P 2nd toe amputation to the mid prox phalanx with cortical erosions 2nd and 3rd digits.    EKG tracing reviewed with NSR at 70 with RBBB.

## 2019-11-12 NOTE — ED ADULT NURSE NOTE - OBJECTIVE STATEMENT
74y male with hx of DM2 presents to the ER c/o infection right foot. pt is alert and oriented x 4 and speaking coherently. pt states on 9/24 he had sx on his right foot. on 9/28 he began to have redness extending up his right leg with swelling to the right foot. pt states his foot remained red but the redness of his calf has subsided. pt states yesterday he noticed a small amount of purulent drainage from his 2nd toe. pt denies cp, sob, fevers, chills. vss.

## 2019-11-12 NOTE — ED PROVIDER NOTE - CLINICAL SUMMARY MEDICAL DECISION MAKING FREE TEXT BOX
Dr. Crews Note: diabetic foot infection post-op on antibx one week with persistent reddness and swelling isolated to foot.  No systemic symptoms, no leg symptoms to suggest DVT, MRI outpatient showing infection of foot, IV antibiotics and likely OR intervention.

## 2019-11-13 ENCOUNTER — TRANSCRIPTION ENCOUNTER (OUTPATIENT)
Age: 74
End: 2019-11-13

## 2019-11-13 DIAGNOSIS — M86.9 OSTEOMYELITIS, UNSPECIFIED: ICD-10-CM

## 2019-11-13 DIAGNOSIS — L03.115 CELLULITIS OF RIGHT LOWER LIMB: ICD-10-CM

## 2019-11-13 DIAGNOSIS — Z02.9 ENCOUNTER FOR ADMINISTRATIVE EXAMINATIONS, UNSPECIFIED: ICD-10-CM

## 2019-11-13 DIAGNOSIS — Z01.818 ENCOUNTER FOR OTHER PREPROCEDURAL EXAMINATION: ICD-10-CM

## 2019-11-13 LAB
ANION GAP SERPL CALC-SCNC: 11 MMOL/L — SIGNIFICANT CHANGE UP (ref 5–17)
BASOPHILS # BLD AUTO: 0.03 K/UL — SIGNIFICANT CHANGE UP (ref 0–0.2)
BASOPHILS NFR BLD AUTO: 0.6 % — SIGNIFICANT CHANGE UP (ref 0–2)
BUN SERPL-MCNC: 14 MG/DL — SIGNIFICANT CHANGE UP (ref 7–23)
CALCIUM SERPL-MCNC: 9.1 MG/DL — SIGNIFICANT CHANGE UP (ref 8.4–10.5)
CHLORIDE SERPL-SCNC: 105 MMOL/L — SIGNIFICANT CHANGE UP (ref 96–108)
CO2 SERPL-SCNC: 26 MMOL/L — SIGNIFICANT CHANGE UP (ref 22–31)
CREAT SERPL-MCNC: 0.77 MG/DL — SIGNIFICANT CHANGE UP (ref 0.5–1.3)
CULTURE RESULTS: NO GROWTH — SIGNIFICANT CHANGE UP
EOSINOPHIL # BLD AUTO: 0.08 K/UL — SIGNIFICANT CHANGE UP (ref 0–0.5)
EOSINOPHIL NFR BLD AUTO: 1.5 % — SIGNIFICANT CHANGE UP (ref 0–6)
GLUCOSE SERPL-MCNC: 125 MG/DL — HIGH (ref 70–99)
HBA1C BLD-MCNC: 6.1 % — HIGH (ref 4–5.6)
HCT VFR BLD CALC: 37.4 % — LOW (ref 39–50)
HCV AB S/CO SERPL IA: 0.27 S/CO — SIGNIFICANT CHANGE UP (ref 0–0.99)
HCV AB SERPL-IMP: SIGNIFICANT CHANGE UP
HGB BLD-MCNC: 12.7 G/DL — LOW (ref 13–17)
IMM GRANULOCYTES NFR BLD AUTO: 0.6 % — SIGNIFICANT CHANGE UP (ref 0–1.5)
LYMPHOCYTES # BLD AUTO: 1.31 K/UL — SIGNIFICANT CHANGE UP (ref 1–3.3)
LYMPHOCYTES # BLD AUTO: 24.2 % — SIGNIFICANT CHANGE UP (ref 13–44)
MCHC RBC-ENTMCNC: 30.4 PG — SIGNIFICANT CHANGE UP (ref 27–34)
MCHC RBC-ENTMCNC: 34 GM/DL — SIGNIFICANT CHANGE UP (ref 32–36)
MCV RBC AUTO: 89.5 FL — SIGNIFICANT CHANGE UP (ref 80–100)
MONOCYTES # BLD AUTO: 0.54 K/UL — SIGNIFICANT CHANGE UP (ref 0–0.9)
MONOCYTES NFR BLD AUTO: 10 % — SIGNIFICANT CHANGE UP (ref 2–14)
NEUTROPHILS # BLD AUTO: 3.43 K/UL — SIGNIFICANT CHANGE UP (ref 1.8–7.4)
NEUTROPHILS NFR BLD AUTO: 63.1 % — SIGNIFICANT CHANGE UP (ref 43–77)
NRBC # BLD: 0 /100 WBCS — SIGNIFICANT CHANGE UP (ref 0–0)
PLATELET # BLD AUTO: 157 K/UL — SIGNIFICANT CHANGE UP (ref 150–400)
POTASSIUM SERPL-MCNC: 4.3 MMOL/L — SIGNIFICANT CHANGE UP (ref 3.5–5.3)
POTASSIUM SERPL-SCNC: 4.3 MMOL/L — SIGNIFICANT CHANGE UP (ref 3.5–5.3)
RBC # BLD: 4.18 M/UL — LOW (ref 4.2–5.8)
RBC # FLD: 12.9 % — SIGNIFICANT CHANGE UP (ref 10.3–14.5)
SODIUM SERPL-SCNC: 142 MMOL/L — SIGNIFICANT CHANGE UP (ref 135–145)
SPECIMEN SOURCE: SIGNIFICANT CHANGE UP
WBC # BLD: 5.42 K/UL — SIGNIFICANT CHANGE UP (ref 3.8–10.5)
WBC # FLD AUTO: 5.42 K/UL — SIGNIFICANT CHANGE UP (ref 3.8–10.5)

## 2019-11-13 PROCEDURE — 99233 SBSQ HOSP IP/OBS HIGH 50: CPT | Mod: GC

## 2019-11-13 PROCEDURE — 99255 IP/OBS CONSLTJ NEW/EST HI 80: CPT

## 2019-11-13 RX ORDER — SODIUM CHLORIDE 9 MG/ML
1000 INJECTION INTRAMUSCULAR; INTRAVENOUS; SUBCUTANEOUS
Refills: 0 | Status: DISCONTINUED | OUTPATIENT
Start: 2019-11-14 | End: 2019-11-14

## 2019-11-13 RX ORDER — OXYCODONE HYDROCHLORIDE 5 MG/1
5 TABLET ORAL EVERY 4 HOURS
Refills: 0 | Status: DISCONTINUED | OUTPATIENT
Start: 2019-11-13 | End: 2019-11-14

## 2019-11-13 RX ORDER — PIPERACILLIN AND TAZOBACTAM 4; .5 G/20ML; G/20ML
3.38 INJECTION, POWDER, LYOPHILIZED, FOR SOLUTION INTRAVENOUS EVERY 8 HOURS
Refills: 0 | Status: DISCONTINUED | OUTPATIENT
Start: 2019-11-13 | End: 2019-11-14

## 2019-11-13 RX ORDER — ACETAMINOPHEN 500 MG
1000 TABLET ORAL ONCE
Refills: 0 | Status: COMPLETED | OUTPATIENT
Start: 2019-11-13 | End: 2019-11-13

## 2019-11-13 RX ORDER — ACETAMINOPHEN 500 MG
1000 TABLET ORAL ONCE
Refills: 0 | Status: DISCONTINUED | OUTPATIENT
Start: 2019-11-13 | End: 2019-11-13

## 2019-11-13 RX ORDER — ACETAMINOPHEN 500 MG
650 TABLET ORAL EVERY 6 HOURS
Refills: 0 | Status: DISCONTINUED | OUTPATIENT
Start: 2019-11-13 | End: 2019-11-14

## 2019-11-13 RX ADMIN — PIPERACILLIN AND TAZOBACTAM 25 GRAM(S): 4; .5 INJECTION, POWDER, LYOPHILIZED, FOR SOLUTION INTRAVENOUS at 21:08

## 2019-11-13 RX ADMIN — Medication 400 MILLIGRAM(S): at 13:36

## 2019-11-13 RX ADMIN — OXYCODONE HYDROCHLORIDE 5 MILLIGRAM(S): 5 TABLET ORAL at 17:00

## 2019-11-13 RX ADMIN — OXYCODONE HYDROCHLORIDE 5 MILLIGRAM(S): 5 TABLET ORAL at 16:44

## 2019-11-13 RX ADMIN — CEFEPIME 100 MILLIGRAM(S): 1 INJECTION, POWDER, FOR SOLUTION INTRAMUSCULAR; INTRAVENOUS at 05:49

## 2019-11-13 RX ADMIN — OXYCODONE HYDROCHLORIDE 5 MILLIGRAM(S): 5 TABLET ORAL at 22:07

## 2019-11-13 RX ADMIN — ENOXAPARIN SODIUM 40 MILLIGRAM(S): 100 INJECTION SUBCUTANEOUS at 13:23

## 2019-11-13 RX ADMIN — Medication 250 MILLIGRAM(S): at 18:14

## 2019-11-13 RX ADMIN — Medication 250 MILLIGRAM(S): at 06:28

## 2019-11-13 RX ADMIN — Medication 81 MILLIGRAM(S): at 13:23

## 2019-11-13 RX ADMIN — Medication 1000 MILLIGRAM(S): at 13:55

## 2019-11-13 RX ADMIN — OXYCODONE HYDROCHLORIDE 5 MILLIGRAM(S): 5 TABLET ORAL at 22:37

## 2019-11-13 RX ADMIN — Medication 100 MILLIGRAM(S): at 05:50

## 2019-11-13 NOTE — CONSULT NOTE ADULT - SUBJECTIVE AND OBJECTIVE BOX
HPI:  74 m with DM, HTN, CAD s/p CABG, previous L 2nd toe amp, developed R toe swelling and pain 2019 s/p partial amputation but never improved and for the last week had worsening edema and pain, an outside xray was s/o osteo, he was started on clinda and when the MRI showed osteo and pt did not improve, came to ER. He denied fever, chills, diarrhea   here afebrile, WBC normal, ESR: 28, CRP: 0.62  xray: Bony destruction and lucency at the base of the proximal phalanges of the second and third digits as well as the distal aspects of the second and third metatarsals consistent with osteomyelitis.    PAST MEDICAL & SURGICAL HISTORY:  Coronary artery disease  Hypertension  Diabetes  S/P CABG (coronary artery bypass graft)      Allergies    No Known Allergies    Intolerances        ANTIMICROBIALS:  cefepime   IVPB 2000 every 12 hours  vancomycin  IVPB 1000 every 12 hours      OTHER MEDS:  acetaminophen   Tablet .. 650 milliGRAM(s) Oral every 6 hours PRN  acetaminophen   Tablet .. 650 milliGRAM(s) Oral every 6 hours PRN  aspirin enteric coated 81 milliGRAM(s) Oral daily  dextrose 40% Gel 15 Gram(s) Oral once PRN  dextrose 5%. 1000 milliLiter(s) IV Continuous <Continuous>  dextrose 50% Injectable 12.5 Gram(s) IV Push once  dextrose 50% Injectable 25 Gram(s) IV Push once  dextrose 50% Injectable 25 Gram(s) IV Push once  glucagon  Injectable 1 milliGRAM(s) IntraMuscular once PRN  insulin lispro (HumaLOG) corrective regimen sliding scale   SubCutaneous three times a day before meals  insulin lispro (HumaLOG) corrective regimen sliding scale   SubCutaneous at bedtime  metoprolol succinate  milliGRAM(s) Oral daily      SOCIAL HISTORY:  , lives with wife, no pets at home, former smoker  no alcohol or drug abuse  no recent travel    FAMILY HISTORY:  Family history of diabetes mellitus in mother      ROS:     All other systems negative     Constitutional: no fever, no chills, no weight loss, no night sweats  Eye: no eye pain, no redness, no vision changes  ENT:  no sore throat, no rhinorrhea  Cardiovascular:  no chest pain, no palpitation  Respiratory:  no SOB, no cough  GI:  no abd pain, no vomiting, no diarrhea  urinary: no dysuria, no hematuria, no flank pain  : no penile discharge or bleeding  musculoskeletal:  R foot swelling and pain  skin:  no rash  neurology:  no headache, no seizure, no change in mental status  psych: no anxiety, no depression     Physical Exam:    General:    NAD, non toxic  Head: atraumatic, normocephalic  Eyes: normal sclera and conjunctiva  ENT:   no oropharyngeal lesions, no LAD, neck supple  Cardio:   sternotomy scar, regular S1,S2, no murmur  Respiratory:   clear b/l, no wheezing  abd:   soft, BS +, not tender, no hepatosplenomegaly  :     no CVAT, no suprapubic tenderness, no eduardo  Musculoskeletal : s/p L 2nd partial toe amp, R partial 2nd amp with edema, erythema, warmth and tenseness of the foot, no draining wound  Skin:    no rash  vascular: no phlebitis, normal pulses  Neurologic:     no focal deficits  psych: normal affect      Drug Dosing Weight  Height (cm): 177.8 (2019 13:38)  Weight (kg): 92.5 (2019 13:38)  BMI (kg/m2): 29.3 (2019 13:38)  BSA (m2): 2.1 (2019 13:38)    Vital Signs Last 24 Hrs  T(F): 97.7 (19 @ 13:30), Max: 98 (19 @ 21:20)    Vital Signs Last 24 Hrs  HR: 62 (19 @ 13:30) (62 - 74)  BP: 151/77 (19 @ 13:30) (134/77 - 175/81)  RR: 19 (19 @ 13:30)  SpO2: 99% (19 @ 13:30) (97% - 100%)  Wt(kg): --                          12.7   5.42  )-----------( 157      ( 2019 06:59 )             37.4           142  |  105  |  14  ----------------------------<  125<H>  4.3   |  26  |  0.77    Ca    9.1      2019 06:58    TPro  8.4<H>  /  Alb  4.6  /  TBili  0.4  /  DBili  x   /  AST  15  /  ALT  16  /  AlkPhos  59  -12      Urinalysis Basic - ( 2019 18:42 )    Color: Light Yellow / Appearance: Clear / S.015 / pH: x  Gluc: x / Ketone: Negative  / Bili: Negative / Urobili: Negative   Blood: x / Protein: Negative / Nitrite: Negative   Leuk Esterase: Negative / RBC: 9 /hpf / WBC 1 /HPF   Sq Epi: x / Non Sq Epi: 0 /hpf / Bacteria: Negative        MICROBIOLOGY:  v              RADIOLOGY:    Images below reviewed personally  < from: Xray Foot AP + Lateral + Oblique, Right (19 @ 19:29) >  TECHNIQUE:   3 views right foot    IMPRESSION:  Bony destruction and lucency at the base of the proximal   phalanges of the second and third digits as well as the distal aspects of   the second and third metatarsals consistent with osteomyelitis.    Status post amputation of the second ray at the level of the midshaft of   the proximal phalanx.    Vascular calcifications.

## 2019-11-13 NOTE — PROGRESS NOTE ADULT - PROBLEM SELECTOR PLAN 3
ON Toprol as above. -pt taking metformin 500 qid at home  -c/w FS qAC and qHS with ISS for now RCRI score of 1 due to CAD w/ CABG Which represents at least 6% risk of death, MI or cardiac arrest within 30 days. Patient is moderate risk for a low to moderate risk procedure.   greats METs >10, works out 3 times a weeks, no limitation to functional status denies CP, SOB,    -Recent EKG performed on 11/12 showed NSR with right bundle branch block which is chronic. Also reports have "normal echo" a few months with his cardiologist Dr Allen. CXR clear. Furthermore, patient with recent podiatry surgery in September with no previous surgical or anesthetic complications.  -currently medically optimized for podiatric surgery of right foot.

## 2019-11-13 NOTE — PROGRESS NOTE ADULT - PROBLEM SELECTOR PROBLEM 3
Essential hypertension Type 2 diabetes mellitus with other circulatory complication, without long-term current use of insulin Preoperative clearance

## 2019-11-13 NOTE — PROGRESS NOTE ADULT - PROBLEM SELECTOR PROBLEM 5
Coronary artery disease involving coronary bypass graft of native heart without angina pectoris Essential hypertension

## 2019-11-13 NOTE — DIETITIAN INITIAL EVALUATION ADULT. - PERTINENT LABORATORY DATA
CAPILLARY BLOOD GLUCOSE  POCT Blood Glucose.: 132 mg/dL (13 Nov 2019 08:15)  POCT Blood Glucose.: 130 mg/dL (12 Nov 2019 22:15)

## 2019-11-13 NOTE — PROGRESS NOTE ADULT - SUBJECTIVE AND OBJECTIVE BOX
Patient is a 74y old  Male who presents with a chief complaint of Persistent RIGHT forefoot pain, swelling for the past 6 days but worsening since 19 (2019 08:48)       INTERVAL HPI/OVERNIGHT EVENTS:  Patient seen and evaluated at bedside.  Pt is resting comfortable in NAD. Denies N/V/F/C.  Pain rated at X/10    Allergies    No Known Allergies    Intolerances        Vital Signs Last 24 Hrs  T(C): 36.5 (2019 09:15), Max: 36.7 (2019 21:20)  T(F): 97.7 (2019 09:15), Max: 98 (2019 21:20)  HR: 67 (2019 09:15) (64 - 74)  BP: 137/74 (2019 09:15) (134/77 - 175/81)  BP(mean): --  RR: 18 (2019 09:15) (16 - 19)  SpO2: 99% (2019 09:15) (97% - 100%)    LABS:                        12.7   5.42  )-----------( 157      ( 2019 06:59 )             37.4     11-    142  |  105  |  14  ----------------------------<  125<H>  4.3   |  26  |  0.77    Ca    9.1      2019 06:58    TPro  8.4<H>  /  Alb  4.6  /  TBili  0.4  /  DBili  x   /  AST  15  /  ALT  16  /  AlkPhos  59  11-12    PT/INR - ( 2019 18:42 )   PT: 10.7 sec;   INR: 0.93 ratio         PTT - ( 2019 18:42 )  PTT:32.3 sec  Urinalysis Basic - ( 2019 18:42 )    Color: Light Yellow / Appearance: Clear / S.015 / pH: x  Gluc: x / Ketone: Negative  / Bili: Negative / Urobili: Negative   Blood: x / Protein: Negative / Nitrite: Negative   Leuk Esterase: Negative / RBC: 9 /hpf / WBC 1 /HPF   Sq Epi: x / Non Sq Epi: 0 /hpf / Bacteria: Negative      CAPILLARY BLOOD GLUCOSE      POCT Blood Glucose.: 132 mg/dL (2019 08:15)  POCT Blood Glucose.: 130 mg/dL (2019 22:15)      Lower Extremity Physical Exam:    Vascular: DP/PT 1/4, B/L, CFT <3seconds B/L, Temperature gradient warm to cool, B/L.   Neuro: Epicritic sensation diminished to the level of digits, B/L.  Musculoskeletal/Ortho: s/p partial 2nd toe amp b/l  Skin: erythema and edema of R fore foot, RF partal 2nd toe amp site with pin point opening, serous drainage and crusts noted.  RADIOLOGY & ADDITIONAL TESTS: Patient is a 74y old  Male who presents with a chief complaint of Persistent RIGHT forefoot pain, swelling for the past 6 days but worsening since 19 (2019 08:48)       INTERVAL HPI/OVERNIGHT EVENTS:  Patient seen and evaluated at bedside.  Pt is resting comfortable in NAD. Denies N/V/F/C.  Pain rated at X/10    Allergies    No Known Allergies    Intolerances        Vital Signs Last 24 Hrs  T(C): 36.5 (2019 09:15), Max: 36.7 (2019 21:20)  T(F): 97.7 (2019 09:15), Max: 98 (2019 21:20)  HR: 67 (2019 09:15) (64 - 74)  BP: 137/74 (2019 09:15) (134/77 - 175/81)  BP(mean): --  RR: 18 (2019 09:15) (16 - 19)  SpO2: 99% (2019 09:15) (97% - 100%)    LABS:                        12.7   5.42  )-----------( 157      ( 2019 06:59 )             37.4     11-    142  |  105  |  14  ----------------------------<  125<H>  4.3   |  26  |  0.77    Ca    9.1      2019 06:58    TPro  8.4<H>  /  Alb  4.6  /  TBili  0.4  /  DBili  x   /  AST  15  /  ALT  16  /  AlkPhos  59  11-12    PT/INR - ( 2019 18:42 )   PT: 10.7 sec;   INR: 0.93 ratio         PTT - ( 2019 18:42 )  PTT:32.3 sec  Urinalysis Basic - ( 2019 18:42 )    Color: Light Yellow / Appearance: Clear / S.015 / pH: x  Gluc: x / Ketone: Negative  / Bili: Negative / Urobili: Negative   Blood: x / Protein: Negative / Nitrite: Negative   Leuk Esterase: Negative / RBC: 9 /hpf / WBC 1 /HPF   Sq Epi: x / Non Sq Epi: 0 /hpf / Bacteria: Negative      CAPILLARY BLOOD GLUCOSE      POCT Blood Glucose.: 132 mg/dL (2019 08:15)  POCT Blood Glucose.: 130 mg/dL (2019 22:15)      Lower Extremity Physical Exam:    Vascular: DP/PT 1/4, B/L, CFT <3seconds B/L, Temperature gradient warm to cool, B/L.   Neuro: Epicritic sensation diminished to the level of digits, B/L.  Musculoskeletal/Ortho: s/p partial 2nd toe amp b/l  Skin: erythema and edema of R fore foot, RF partal 2nd toe amp site with pin point opening latearl side, serous drainage and crusts noted.  RADIOLOGY & ADDITIONAL TESTS:

## 2019-11-13 NOTE — PROGRESS NOTE ADULT - PROBLEM SELECTOR PLAN 1
- currently receiving vancomycin 1g q12 and cefepime 2g q12  - check vanc troughs  - podiatry following, originally planned for partial 2nd ray resection on 11/15 - currently receiving vancomycin 1g q12 and cefepime 2g q12  - check vanc trough prior to 3rd dose  - podiatry following, going for partial 2nd ray resection on 11/14.  - ID consulted for antibiotic management for chronic OM. outpatient MRI with signs of 2nd R toe osteo, +ESR/CRP   -discussed with podiatry attending Dr Light, for OR scottie for plan for partial 2nd ray resection and 3rd met bone biopsy and/or met head resection, at risk for TMA but patient refusing for now thus will require long term IV antibiotics with PICC/midline, requested ID eval, NPO after MN  -antibiotic as below  --f/u OR cultures  -afebrile, no leukocytosis, HD stable

## 2019-11-13 NOTE — PROGRESS NOTE ADULT - PROBLEM SELECTOR PROBLEM 2
Type 2 diabetes mellitus with other circulatory complication, without long-term current use of insulin Preoperative clearance Cellulitis of right lower extremity

## 2019-11-13 NOTE — PROGRESS NOTE ADULT - ASSESSMENT
75 y/o M w/ hx of HTN, T2DM, CAD s/p CABG (2007), with previous left 2nd toe amputation and right 2nd toe amputation now presenting for worsening right foot pain. Pt with recent outpatient MRI showing OM now s/p short course of clindamycin with no improvement, currently pending medical clearance for podiatric surgery. 75 y/o M w/ hx of HTN, T2DM, CAD s/p CABG (2007), with previous left 2nd toe amputation and right 2nd toe amputation now presenting for worsening right foot pain with recent outpatient MRI showing R 2nd digit OM and foot cellulitis now s/p short course of clindamycin with no improvement, podiatric surgery tomorrow and likely long term IV antibiotics

## 2019-11-13 NOTE — PHYSICAL THERAPY INITIAL EVALUATION ADULT - ADDITIONAL COMMENTS
Pt lives in a private home with no steps to negotiate. PTA pt was (I) with ADLs and funcitonal mobility. Pt lives in a private home with no steps to negotiate. PTA pt was (I) with ADLs and functional mobility with no assistive device. Pt goes to the gym regularly.

## 2019-11-13 NOTE — PROGRESS NOTE ADULT - PROBLEM SELECTOR PROBLEM 4
Preoperative clearance Essential hypertension Type 2 diabetes mellitus with other circulatory complication, without long-term current use of insulin

## 2019-11-13 NOTE — PROGRESS NOTE ADULT - PROBLEM SELECTOR PLAN 2
FS S/S for now, would consider low dose Lantus tomorrow at bedtime. Pt with RCRI score of 1 due to CAD w/ CABG with good functional status. Which represents a 6% risk of death, MI or cardiac arrest within 30 days. Patient is moderate risk for a low to moderate risk procedure. Pt is without CP, SOB, or fever. He is able to ambulate independently and can climb 1 flight of stairs with no difficulty. Good functional status. Recent EKG performed on 11/12 showed NSR with right bundle branch block. Pt is currently medically optimized for podiatric surgery of right foot. Pt with recent podiatry surgery in September with no previous surgical or anesthetic complications. mild cellulitis to R midfoot with osteo as above  - c/w vanc 1 gram q12, monitor vanc trough  - per ID, switch to zosyn 3.375gram q8  -pain control with tylenol and oxy prn

## 2019-11-13 NOTE — DIETITIAN INITIAL EVALUATION ADULT. - PERTINENT MEDS FT
MEDICATIONS  (STANDING):  aspirin enteric coated 81 milliGRAM(s) Oral daily  cefepime   IVPB 2000 milliGRAM(s) IV Intermittent every 12 hours  dextrose 5%. 1000 milliLiter(s) (50 mL/Hr) IV Continuous <Continuous>  dextrose 50% Injectable 12.5 Gram(s) IV Push once  dextrose 50% Injectable 25 Gram(s) IV Push once  dextrose 50% Injectable 25 Gram(s) IV Push once  enoxaparin Injectable 40 milliGRAM(s) SubCutaneous daily  insulin lispro (HumaLOG) corrective regimen sliding scale   SubCutaneous three times a day before meals  insulin lispro (HumaLOG) corrective regimen sliding scale   SubCutaneous at bedtime  metoprolol succinate  milliGRAM(s) Oral daily  vancomycin  IVPB 1000 milliGRAM(s) IV Intermittent every 12 hours    MEDICATIONS  (PRN):  acetaminophen   Tablet .. 650 milliGRAM(s) Oral every 6 hours PRN Temp greater or equal to 38C (100.4F)  dextrose 40% Gel 15 Gram(s) Oral once PRN Blood Glucose LESS THAN 70 milliGRAM(s)/deciliter  glucagon  Injectable 1 milliGRAM(s) IntraMuscular once PRN Glucose LESS THAN 70 milligrams/deciliter

## 2019-11-13 NOTE — PROGRESS NOTE ADULT - SUBJECTIVE AND OBJECTIVE BOX
Patient is a 74y old  Male who presents with a chief complaint of Persistent RIGHT forefoot pain, swelling for the past 6 days but worsening since 9/28/19 (12 Nov 2019 22:49)     INTERVAL HPI/OVERNIGHT EVENTS: No acute events overnight.    SUBJECTIVE: Pt seen and examined this morning.    MEDICATIONS  (STANDING):  aspirin enteric coated 81 milliGRAM(s) Oral daily  cefepime   IVPB 2000 milliGRAM(s) IV Intermittent every 12 hours  dextrose 5%. 1000 milliLiter(s) (50 mL/Hr) IV Continuous <Continuous>  dextrose 50% Injectable 12.5 Gram(s) IV Push once  dextrose 50% Injectable 25 Gram(s) IV Push once  dextrose 50% Injectable 25 Gram(s) IV Push once  enoxaparin Injectable 40 milliGRAM(s) SubCutaneous daily  insulin lispro (HumaLOG) corrective regimen sliding scale   SubCutaneous three times a day before meals  insulin lispro (HumaLOG) corrective regimen sliding scale   SubCutaneous at bedtime  metoprolol succinate  milliGRAM(s) Oral daily  vancomycin  IVPB 1000 milliGRAM(s) IV Intermittent every 12 hours    MEDICATIONS  (PRN):  acetaminophen   Tablet .. 650 milliGRAM(s) Oral every 6 hours PRN Temp greater or equal to 38C (100.4F)  dextrose 40% Gel 15 Gram(s) Oral once PRN Blood Glucose LESS THAN 70 milliGRAM(s)/deciliter  glucagon  Injectable 1 milliGRAM(s) IntraMuscular once PRN Glucose LESS THAN 70 milligrams/deciliter    Allergies    No Known Allergies    Intolerances    REVIEW OF SYSTEMS:  CONSTITUTIONAL: No fever, weight loss, or fatigue  EYES: No eye pain, visual disturbances, or discharge  ENMT:  No difficulty hearing, tinnitus, vertigo; No sinus or throat pain  RESPIRATORY: No cough, wheezing, chills or hemoptysis; No shortness of breath  CARDIOVASCULAR: No chest pain, palpitations, dizziness, or leg swelling  GASTROINTESTINAL: No abdominal or epigastric pain. No nausea, vomiting, or hematemesis; No diarrhea or constipation. No melena or hematochezia.  GENITOURINARY: No dysuria, frequency, hematuria, or incontinence  NEUROLOGICAL: No headaches, loss of strength, numbness, or tremors  SKIN: No itching, burning, rashes, or lesions   LYMPH NODES: No enlarged glands  ENDOCRINE: No heat or cold intolerance; No polydipsia or polyuria  MUSCULOSKELETAL: No joint pain or swelling;   PSYCHIATRIC: Denies depression, anxiety  HEME/LYMPH: No easy bruising, or bleeding gums  ALLERGY AND IMMUNOLOGIC: No hives or eczema    Vital Signs Last 24 Hrs  T(C): 36.5 (13 Nov 2019 04:55), Max: 36.7 (12 Nov 2019 21:20)  T(F): 97.7 (13 Nov 2019 04:55), Max: 98 (12 Nov 2019 21:20)  HR: 64 (13 Nov 2019 04:55) (64 - 74)  BP: 147/72 (13 Nov 2019 04:55) (134/77 - 175/81)  BP(mean): --  RR: 19 (13 Nov 2019 04:55) (16 - 19)  SpO2: 97% (13 Nov 2019 04:55) (97% - 100%)    PHYSICAL EXAM:  GENERAL: NAD, well-developed  HEENT: NC/AT, EOMI, PERRL  NECK: Supple, No JVD  CHEST/LUNG: Clear to auscultation bilaterally; No rales, rhonchi, wheezing, or rubs  CARD: Regular rate and rhythm; No murmurs, rubs, or gallops. No LE edema  ABDOMEN: Soft, Nontender, Nondistended; Bowel sounds present  EXTREMITIES:  2+ Peripheral Pulses, No clubbing, cyanosis  SKIN: No rashes or lesions  NEURO: AOx3, moving all extremities    LABS:                        12.7   5.42  )-----------( 157      ( 13 Nov 2019 06:59 )             37.4     13 Nov 2019 06:58    142    |  105    |  14     ----------------------------<  125    4.3     |  26     |  0.77     Ca    9.1        13 Nov 2019 06:58    TPro  8.4    /  Alb  4.6    /  TBili  0.4    /  DBili  x      /  AST  15     /  ALT  16     /  AlkPhos  59     12 Nov 2019 18:42    PT/INR - ( 12 Nov 2019 18:42 )   PT: 10.7 sec;   INR: 0.93 ratio         PTT - ( 12 Nov 2019 18:42 )  PTT:32.3 sec  CAPILLARY BLOOD GLUCOSE      POCT Blood Glucose.: 132 mg/dL (13 Nov 2019 08:15)  POCT Blood Glucose.: 130 mg/dL (12 Nov 2019 22:15)    BLOOD CULTURE    RADIOLOGY & ADDITIONAL TESTS:    Imaging Personally Reviewed:  [ ] YES     Consultant(s) Notes Reviewed:      Care Discussed with Consultants/Other Providers: Patient is a 74y old  Male who presents with a chief complaint of Persistent RIGHT forefoot pain, swelling for the past 6 days but worsening since 9/28/19 (12 Nov 2019 22:49)     INTERVAL HPI/OVERNIGHT EVENTS: No acute events overnight.    SUBJECTIVE: Pt seen and examined this morning. States he feels well overall however does endorse some  He denies any CP, SOB, fever/chills, N/V or abdominal pain.     MEDICATIONS  (STANDING):  aspirin enteric coated 81 milliGRAM(s) Oral daily  cefepime   IVPB 2000 milliGRAM(s) IV Intermittent every 12 hours  dextrose 5%. 1000 milliLiter(s) (50 mL/Hr) IV Continuous <Continuous>  dextrose 50% Injectable 12.5 Gram(s) IV Push once  dextrose 50% Injectable 25 Gram(s) IV Push once  dextrose 50% Injectable 25 Gram(s) IV Push once  enoxaparin Injectable 40 milliGRAM(s) SubCutaneous daily  insulin lispro (HumaLOG) corrective regimen sliding scale   SubCutaneous three times a day before meals  insulin lispro (HumaLOG) corrective regimen sliding scale   SubCutaneous at bedtime  metoprolol succinate  milliGRAM(s) Oral daily  vancomycin  IVPB 1000 milliGRAM(s) IV Intermittent every 12 hours    MEDICATIONS  (PRN):  acetaminophen   Tablet .. 650 milliGRAM(s) Oral every 6 hours PRN Temp greater or equal to 38C (100.4F)  dextrose 40% Gel 15 Gram(s) Oral once PRN Blood Glucose LESS THAN 70 milliGRAM(s)/deciliter  glucagon  Injectable 1 milliGRAM(s) IntraMuscular once PRN Glucose LESS THAN 70 milligrams/deciliter    Allergies    No Known Allergies    Intolerances    REVIEW OF SYSTEMS:  CONSTITUTIONAL: No fever, weight loss, or fatigue  EYES: No eye pain, visual disturbances, or discharge  ENMT:  No difficulty hearing, tinnitus, vertigo; No sinus or throat pain  RESPIRATORY: No cough, wheezing, chills or hemoptysis; No shortness of breath  CARDIOVASCULAR: No chest pain, palpitations, dizziness, or leg swelling  GASTROINTESTINAL: No abdominal or epigastric pain. No nausea, vomiting, or hematemesis; No diarrhea or constipation. No melena or hematochezia.  GENITOURINARY: No dysuria, frequency, hematuria, or incontinence  NEUROLOGICAL: No headaches, loss of strength, numbness, or tremors  SKIN: No itching, burning, rashes, or lesions   LYMPH NODES: No enlarged glands  ENDOCRINE: No heat or cold intolerance; No polydipsia or polyuria  MUSCULOSKELETAL: No joint pain or swelling;   PSYCHIATRIC: Denies depression, anxiety  HEME/LYMPH: No easy bruising, or bleeding gums  ALLERGY AND IMMUNOLOGIC: No hives or eczema    Vital Signs Last 24 Hrs  T(C): 36.5 (13 Nov 2019 04:55), Max: 36.7 (12 Nov 2019 21:20)  T(F): 97.7 (13 Nov 2019 04:55), Max: 98 (12 Nov 2019 21:20)  HR: 64 (13 Nov 2019 04:55) (64 - 74)  BP: 147/72 (13 Nov 2019 04:55) (134/77 - 175/81)  BP(mean): --  RR: 19 (13 Nov 2019 04:55) (16 - 19)  SpO2: 97% (13 Nov 2019 04:55) (97% - 100%)    PHYSICAL EXAM:  GENERAL: NAD, well-developed  HEENT: NC/AT, EOMI, PERRL  NECK: Supple, No JVD  CHEST/LUNG: Clear to auscultation bilaterally; No rales, rhonchi, wheezing, or rubs  CARD: Regular rate and rhythm; No murmurs, rubs, or gallops. No LE edema  ABDOMEN: Soft, Nontender, Nondistended; Bowel sounds present  EXTREMITIES:  2+ Peripheral Pulses, No clubbing, cyanosis  SKIN: No rashes or lesions  NEURO: AOx3, moving all extremities    LABS:                        12.7   5.42  )-----------( 157      ( 13 Nov 2019 06:59 )             37.4     13 Nov 2019 06:58    142    |  105    |  14     ----------------------------<  125    4.3     |  26     |  0.77     Ca    9.1        13 Nov 2019 06:58    TPro  8.4    /  Alb  4.6    /  TBili  0.4    /  DBili  x      /  AST  15     /  ALT  16     /  AlkPhos  59     12 Nov 2019 18:42    PT/INR - ( 12 Nov 2019 18:42 )   PT: 10.7 sec;   INR: 0.93 ratio      PTT - ( 12 Nov 2019 18:42 )  PTT:32.3 sec  CAPILLARY BLOOD GLUCOSE      POCT Blood Glucose.: 132 mg/dL (13 Nov 2019 08:15)  POCT Blood Glucose.: 130 mg/dL (12 Nov 2019 22:15)    BLOOD CULTURE    RADIOLOGY & ADDITIONAL TESTS:  Bony destruction and lucency at the base of the proximal phalanges of the second and third digits as well as the distal aspects of the second and third metatarsals consistent with osteomyelitis.    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    Vascular calcifications.    Imaging Personally Reviewed:  [ ] YES     Consultant(s) Notes Reviewed:      Care Discussed with Consultants/Other Providers: Patient is a 74y old  Male who presents with a chief complaint of Persistent RIGHT forefoot pain, swelling for the past 6 days but worsening since 9/28/19 (12 Nov 2019 22:49)     INTERVAL HPI/OVERNIGHT EVENTS: No acute events overnight.    SUBJECTIVE: Pt seen and examined this morning. States he feels well overall however does endorse some  He denies any CP, SOB, fever/chills, N/V or abdominal pain.     MEDICATIONS  (STANDING):  aspirin enteric coated 81 milliGRAM(s) Oral daily  cefepime   IVPB 2000 milliGRAM(s) IV Intermittent every 12 hours  dextrose 5%. 1000 milliLiter(s) (50 mL/Hr) IV Continuous <Continuous>  dextrose 50% Injectable 12.5 Gram(s) IV Push once  dextrose 50% Injectable 25 Gram(s) IV Push once  dextrose 50% Injectable 25 Gram(s) IV Push once  enoxaparin Injectable 40 milliGRAM(s) SubCutaneous daily  insulin lispro (HumaLOG) corrective regimen sliding scale   SubCutaneous three times a day before meals  insulin lispro (HumaLOG) corrective regimen sliding scale   SubCutaneous at bedtime  metoprolol succinate  milliGRAM(s) Oral daily  vancomycin  IVPB 1000 milliGRAM(s) IV Intermittent every 12 hours    MEDICATIONS  (PRN):  acetaminophen   Tablet .. 650 milliGRAM(s) Oral every 6 hours PRN Temp greater or equal to 38C (100.4F)  dextrose 40% Gel 15 Gram(s) Oral once PRN Blood Glucose LESS THAN 70 milliGRAM(s)/deciliter  glucagon  Injectable 1 milliGRAM(s) IntraMuscular once PRN Glucose LESS THAN 70 milligrams/deciliter    Allergies    No Known Allergies    Intolerances    REVIEW OF SYSTEMS:  CONSTITUTIONAL: No fever, weight loss, or fatigue  EYES: No eye pain, visual disturbances, or discharge  ENMT:  No difficulty hearing, tinnitus, vertigo; No sinus or throat pain  RESPIRATORY: No cough, wheezing, chills or hemoptysis; No shortness of breath  CARDIOVASCULAR: No chest pain, palpitations, dizziness, or leg swelling  GASTROINTESTINAL: No abdominal or epigastric pain. No nausea, vomiting, or hematemesis; No diarrhea or constipation. No melena or hematochezia.  GENITOURINARY: No dysuria, frequency, hematuria, or incontinence  NEUROLOGICAL: No headaches, loss of strength, numbness, or tremors  SKIN: No itching, burning, rashes, or lesions   LYMPH NODES: No enlarged glands  ENDOCRINE: No heat or cold intolerance; No polydipsia or polyuria  MUSCULOSKELETAL: No joint pain or swelling;   PSYCHIATRIC: Denies depression, anxiety  HEME/LYMPH: No easy bruising, or bleeding gums  ALLERGY AND IMMUNOLOGIC: No hives or eczema    Vital Signs Last 24 Hrs  T(C): 36.5 (13 Nov 2019 04:55), Max: 36.7 (12 Nov 2019 21:20)  T(F): 97.7 (13 Nov 2019 04:55), Max: 98 (12 Nov 2019 21:20)  HR: 64 (13 Nov 2019 04:55) (64 - 74)  BP: 147/72 (13 Nov 2019 04:55) (134/77 - 175/81)  BP(mean): --  RR: 19 (13 Nov 2019 04:55) (16 - 19)  SpO2: 97% (13 Nov 2019 04:55) (97% - 100%)    PHYSICAL EXAM:  GENERAL: NAD, well-developed  HEENT: NC/AT, EOMI, PERRL  NECK: Supple, No JVD  CHEST/LUNG: Clear to auscultation bilaterally; No rales, rhonchi, wheezing, or rubs  CARD: Regular rate and rhythm; No murmurs, rubs, or gallops. No LE edema  ABDOMEN: Soft, Nontender, Nondistended; Bowel sounds present  EXTREMITIES:  2+ Peripheral Pulses, 2nd toes on both feet resected. Right foot appears more swollen than Left. R second toe and metatarsal joint with redness and tenderness to palpation. No gangrene or discharge.  SKIN: No rashes or lesions  NEURO: AOx3, moving all extremities    LABS:                        12.7   5.42  )-----------( 157      ( 13 Nov 2019 06:59 )             37.4     13 Nov 2019 06:58    142    |  105    |  14     ----------------------------<  125    4.3     |  26     |  0.77     Ca    9.1        13 Nov 2019 06:58    TPro  8.4    /  Alb  4.6    /  TBili  0.4    /  DBili  x      /  AST  15     /  ALT  16     /  AlkPhos  59     12 Nov 2019 18:42    PT/INR - ( 12 Nov 2019 18:42 )   PT: 10.7 sec;   INR: 0.93 ratio      PTT - ( 12 Nov 2019 18:42 )  PTT:32.3 sec  CAPILLARY BLOOD GLUCOSE      POCT Blood Glucose.: 132 mg/dL (13 Nov 2019 08:15)  POCT Blood Glucose.: 130 mg/dL (12 Nov 2019 22:15)    BLOOD CULTURE    RADIOLOGY & ADDITIONAL TESTS:  Bony destruction and lucency at the base of the proximal phalanges of the second and third digits as well as the distal aspects of the second and third metatarsals consistent with osteomyelitis.    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    Vascular calcifications.    Imaging Personally Reviewed:  [ ] YES     Consultant(s) Notes Reviewed:      Care Discussed with Consultants/Other Providers: Patient is a 74y old  Male who presents with a chief complaint of Persistent RIGHT forefoot pain, swelling for the past 6 days but worsening since 9/28/19 (12 Nov 2019 22:49)     INTERVAL HPI/OVERNIGHT EVENTS: No acute events overnight.    SUBJECTIVE: Pt seen and examined this morning. States he feels well overall however does endorse some R foot pain.  He denies any CP, SOB, fever/chills, N/V or abdominal pain.     MEDICATIONS  (STANDING):  aspirin enteric coated 81 milliGRAM(s) Oral daily  cefepime   IVPB 2000 milliGRAM(s) IV Intermittent every 12 hours  dextrose 5%. 1000 milliLiter(s) (50 mL/Hr) IV Continuous <Continuous>  dextrose 50% Injectable 12.5 Gram(s) IV Push once  dextrose 50% Injectable 25 Gram(s) IV Push once  dextrose 50% Injectable 25 Gram(s) IV Push once  enoxaparin Injectable 40 milliGRAM(s) SubCutaneous daily  insulin lispro (HumaLOG) corrective regimen sliding scale   SubCutaneous three times a day before meals  insulin lispro (HumaLOG) corrective regimen sliding scale   SubCutaneous at bedtime  metoprolol succinate  milliGRAM(s) Oral daily  vancomycin  IVPB 1000 milliGRAM(s) IV Intermittent every 12 hours    MEDICATIONS  (PRN):  acetaminophen   Tablet .. 650 milliGRAM(s) Oral every 6 hours PRN Temp greater or equal to 38C (100.4F)  dextrose 40% Gel 15 Gram(s) Oral once PRN Blood Glucose LESS THAN 70 milliGRAM(s)/deciliter  glucagon  Injectable 1 milliGRAM(s) IntraMuscular once PRN Glucose LESS THAN 70 milligrams/deciliter    Allergies        Vital Signs Last 24 Hrs  T(C): 36.5 (13 Nov 2019 04:55), Max: 36.7 (12 Nov 2019 21:20)  T(F): 97.7 (13 Nov 2019 04:55), Max: 98 (12 Nov 2019 21:20)  HR: 64 (13 Nov 2019 04:55) (64 - 74)  BP: 147/72 (13 Nov 2019 04:55) (134/77 - 175/81)  BP(mean): --  RR: 19 (13 Nov 2019 04:55) (16 - 19)  SpO2: 97% (13 Nov 2019 04:55) (97% - 100%)    PHYSICAL EXAM:  GENERAL: NAD, well-developed  HEENT: NC/AT,  NECK: Supple, No JVD, no carotid bruits b/l  CHEST/LUNG: Clear to auscultation bilaterally; No rales, rhonchi, wheezing, or rubs  CARD: Regular rate and rhythm; No murmurs, rubs, or gallops. No LE edema  ABDOMEN: Soft, Nontender, Nondistended; Bowel sounds present  EXTREMITIES:  2+ Peripheral Pulses, 2nd toes on both feet resected. Right foot appears more swollen than Left. R second toe and metatarsal joint with redness and tenderness to palpation. No gangrene or discharge.  SKIN: No rashes or lesions  NEURO: AOx3, moving all extremities    LABS:                        12.7   5.42  )-----------( 157      ( 13 Nov 2019 06:59 )             37.4     13 Nov 2019 06:58    142    |  105    |  14     ----------------------------<  125    4.3     |  26     |  0.77     Ca    9.1        13 Nov 2019 06:58    TPro  8.4    /  Alb  4.6    /  TBili  0.4    /  DBili  x      /  AST  15     /  ALT  16     /  AlkPhos  59     12 Nov 2019 18:42    PT/INR - ( 12 Nov 2019 18:42 )   PT: 10.7 sec;   INR: 0.93 ratio      PTT - ( 12 Nov 2019 18:42 )  PTT:32.3 sec  CAPILLARY BLOOD GLUCOSE      POCT Blood Glucose.: 132 mg/dL (13 Nov 2019 08:15)  POCT Blood Glucose.: 130 mg/dL (12 Nov 2019 22:15)    BLOOD CULTURE    RADIOLOGY & ADDITIONAL TESTS:  Bony destruction and lucency at the base of the proximal phalanges of the second and third digits as well as the distal aspects of the second and third metatarsals consistent with osteomyelitis.    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    Vascular calcifications.    Imaging Personally Reviewed:  [ ] YES     Consultant(s) Notes Reviewed:  all    Care Discussed with Consultants/Other Providers: podiatry and ID

## 2019-11-13 NOTE — DIETITIAN INITIAL EVALUATION ADULT. - OTHER INFO
Pt reported good appetite and po intake PTA, followed a Regular diet but watched his sugar intake, and ate 2 meals per day; breakfast and dinner. Stated he checked his FS 1x/d in the morning, BG range 131-137 mg/dL. Denied any food allergies, chewing or swallowing difficulties. Pt reported good appetite and po intake PTA, followed a Regular diet but watched his sugar intake, and ate 2 meals per day; breakfast and dinner. Stated he checked his FS 1x/d in the morning, BG range 131-137 mg/dL. Noted A1C 6.2% (11-13). Denied any food allergies, chewing or swallowing difficulties. Denied taking any micronutrient or oral supplements PTA. Denied any nausea, vomiting, constipation, or diarrhea PTA. Denied any wt changes in the past 3-6 months, reported UBW 91.8 kg/202#, admit wt 92.5 kg/203#, wt stable. Noted with edema 1+ right foot. No pressure ulcers noted. Provided education on DASH, Consistent Carbohydrate diet. Pt was receptive, engaged, and appreciated education. Handout provided.

## 2019-11-13 NOTE — PROGRESS NOTE ADULT - PROBLEM SELECTOR PLAN 4
Pt with RCRI score of - c/w patient's home metoprolol succinate 100 qd -pt taking metformin 500 qid at home, hold for now  -monitor FS qAC and qHS   -c/w NAY

## 2019-11-13 NOTE — PROGRESS NOTE ADULT - ASSESSMENT
73 yo M w/ RF 2nd toe osteomyelitis and cellulitis to midfoot   - Pt seen and evaluated   - XR reviewed; outpt MRI placed in chart - OM of 2nd and 3rd MPJs  - Pt booked for Right foot partial 2nd ray and 3rd mpj resection with possible placement of antibiotic beads tomorrow @130pm  - pt will need long term antibiotics - rec ID c/s   - Please document medical optimization for local anesthesia with sedation.   - seen w attending 75 yo M w/ RF 2nd toe osteomyelitis and cellulitis to midfoot   - Pt seen and evaluated   - XR reviewed; outpt MRI placed in chart - OM of 2nd and 3rd MPJs  - Pt booked for Right foot partial 2nd ray and 3rd bone biospy/mpj resection with possible placement of antibiotic beads tomorrow @130pm  - pt will need long term antibiotics - rec ID c/s   - Please document medical optimization for local anesthesia with sedation.   - seen w attending

## 2019-11-13 NOTE — PROGRESS NOTE ADULT - PROBLEM SELECTOR PLAN 7
Transitions of Care Status:  1.  Name of PCP:  2.  PCP Contacted on Admission: [ ] Y    [ ] N    3.  PCP contacted at Discharge: [ ] Y    [ ] N    [ ] N/A  4.  Post-Discharge Appointment Date and Location:  5.  Summary of Handoff given to PCP: DVT: Lovenox held after todays dose for OR

## 2019-11-14 ENCOUNTER — RESULT REVIEW (OUTPATIENT)
Age: 74
End: 2019-11-14

## 2019-11-14 ENCOUNTER — TRANSCRIPTION ENCOUNTER (OUTPATIENT)
Age: 74
End: 2019-11-14

## 2019-11-14 LAB
ANION GAP SERPL CALC-SCNC: 13 MMOL/L — SIGNIFICANT CHANGE UP (ref 5–17)
APTT BLD: 31.2 SEC — SIGNIFICANT CHANGE UP (ref 27.5–36.3)
BLD GP AB SCN SERPL QL: NEGATIVE — SIGNIFICANT CHANGE UP
BUN SERPL-MCNC: 16 MG/DL — SIGNIFICANT CHANGE UP (ref 7–23)
CALCIUM SERPL-MCNC: 8.9 MG/DL — SIGNIFICANT CHANGE UP (ref 8.4–10.5)
CHLORIDE SERPL-SCNC: 104 MMOL/L — SIGNIFICANT CHANGE UP (ref 96–108)
CO2 SERPL-SCNC: 24 MMOL/L — SIGNIFICANT CHANGE UP (ref 22–31)
CREAT SERPL-MCNC: 0.78 MG/DL — SIGNIFICANT CHANGE UP (ref 0.5–1.3)
GLUCOSE SERPL-MCNC: 126 MG/DL — HIGH (ref 70–99)
HCT VFR BLD CALC: 36.2 % — LOW (ref 39–50)
HGB BLD-MCNC: 12.4 G/DL — LOW (ref 13–17)
INR BLD: 1.08 RATIO — SIGNIFICANT CHANGE UP (ref 0.88–1.16)
MAGNESIUM SERPL-MCNC: 2 MG/DL — SIGNIFICANT CHANGE UP (ref 1.6–2.6)
MCHC RBC-ENTMCNC: 30.3 PG — SIGNIFICANT CHANGE UP (ref 27–34)
MCHC RBC-ENTMCNC: 34.3 GM/DL — SIGNIFICANT CHANGE UP (ref 32–36)
MCV RBC AUTO: 88.5 FL — SIGNIFICANT CHANGE UP (ref 80–100)
NRBC # BLD: 0 /100 WBCS — SIGNIFICANT CHANGE UP (ref 0–0)
PHOSPHATE SERPL-MCNC: 3.2 MG/DL — SIGNIFICANT CHANGE UP (ref 2.5–4.5)
PLATELET # BLD AUTO: 159 K/UL — SIGNIFICANT CHANGE UP (ref 150–400)
POTASSIUM SERPL-MCNC: 4.3 MMOL/L — SIGNIFICANT CHANGE UP (ref 3.5–5.3)
POTASSIUM SERPL-SCNC: 4.3 MMOL/L — SIGNIFICANT CHANGE UP (ref 3.5–5.3)
PROTHROM AB SERPL-ACNC: 12.3 SEC — SIGNIFICANT CHANGE UP (ref 10–12.9)
RBC # BLD: 4.09 M/UL — LOW (ref 4.2–5.8)
RBC # FLD: 12.9 % — SIGNIFICANT CHANGE UP (ref 10.3–14.5)
RH IG SCN BLD-IMP: POSITIVE — SIGNIFICANT CHANGE UP
SODIUM SERPL-SCNC: 141 MMOL/L — SIGNIFICANT CHANGE UP (ref 135–145)
VANCOMYCIN TROUGH SERPL-MCNC: 9.3 UG/ML — LOW (ref 10–20)
WBC # BLD: 5.65 K/UL — SIGNIFICANT CHANGE UP (ref 3.8–10.5)
WBC # FLD AUTO: 5.65 K/UL — SIGNIFICANT CHANGE UP (ref 3.8–10.5)

## 2019-11-14 PROCEDURE — 88311 DECALCIFY TISSUE: CPT | Mod: 26

## 2019-11-14 PROCEDURE — 73630 X-RAY EXAM OF FOOT: CPT | Mod: 26,RT

## 2019-11-14 PROCEDURE — 99233 SBSQ HOSP IP/OBS HIGH 50: CPT

## 2019-11-14 PROCEDURE — 88304 TISSUE EXAM BY PATHOLOGIST: CPT | Mod: 26

## 2019-11-14 PROCEDURE — 99233 SBSQ HOSP IP/OBS HIGH 50: CPT | Mod: GC

## 2019-11-14 RX ORDER — DEXTROSE 50 % IN WATER 50 %
15 SYRINGE (ML) INTRAVENOUS ONCE
Refills: 0 | Status: DISCONTINUED | OUTPATIENT
Start: 2019-11-14 | End: 2019-11-20

## 2019-11-14 RX ORDER — OXYCODONE HYDROCHLORIDE 5 MG/1
5 TABLET ORAL EVERY 4 HOURS
Refills: 0 | Status: DISCONTINUED | OUTPATIENT
Start: 2019-11-14 | End: 2019-11-20

## 2019-11-14 RX ORDER — ACETAMINOPHEN 500 MG
650 TABLET ORAL ONCE
Refills: 0 | Status: COMPLETED | OUTPATIENT
Start: 2019-11-14 | End: 2019-11-14

## 2019-11-14 RX ORDER — DEXTROSE 50 % IN WATER 50 %
12.5 SYRINGE (ML) INTRAVENOUS ONCE
Refills: 0 | Status: DISCONTINUED | OUTPATIENT
Start: 2019-11-14 | End: 2019-11-20

## 2019-11-14 RX ORDER — METFORMIN HYDROCHLORIDE 850 MG/1
1 TABLET ORAL
Qty: 0 | Refills: 0 | DISCHARGE

## 2019-11-14 RX ORDER — HYDROMORPHONE HYDROCHLORIDE 2 MG/ML
0.5 INJECTION INTRAMUSCULAR; INTRAVENOUS; SUBCUTANEOUS
Refills: 0 | Status: DISCONTINUED | OUTPATIENT
Start: 2019-11-14 | End: 2019-11-14

## 2019-11-14 RX ORDER — VANCOMYCIN HCL 1 G
1000 VIAL (EA) INTRAVENOUS EVERY 12 HOURS
Refills: 0 | Status: DISCONTINUED | OUTPATIENT
Start: 2019-11-14 | End: 2019-11-17

## 2019-11-14 RX ORDER — SODIUM CHLORIDE 9 MG/ML
1000 INJECTION INTRAMUSCULAR; INTRAVENOUS; SUBCUTANEOUS
Refills: 0 | Status: DISCONTINUED | OUTPATIENT
Start: 2019-11-14 | End: 2019-11-16

## 2019-11-14 RX ORDER — INSULIN LISPRO 100/ML
VIAL (ML) SUBCUTANEOUS
Refills: 0 | Status: DISCONTINUED | OUTPATIENT
Start: 2019-11-14 | End: 2019-11-20

## 2019-11-14 RX ORDER — ACETAMINOPHEN 500 MG
650 TABLET ORAL EVERY 6 HOURS
Refills: 0 | Status: DISCONTINUED | OUTPATIENT
Start: 2019-11-14 | End: 2019-11-20

## 2019-11-14 RX ORDER — PIPERACILLIN AND TAZOBACTAM 4; .5 G/20ML; G/20ML
3.38 INJECTION, POWDER, LYOPHILIZED, FOR SOLUTION INTRAVENOUS EVERY 8 HOURS
Refills: 0 | Status: DISCONTINUED | OUTPATIENT
Start: 2019-11-14 | End: 2019-11-19

## 2019-11-14 RX ORDER — DEXTROSE 50 % IN WATER 50 %
25 SYRINGE (ML) INTRAVENOUS ONCE
Refills: 0 | Status: DISCONTINUED | OUTPATIENT
Start: 2019-11-14 | End: 2019-11-20

## 2019-11-14 RX ORDER — INSULIN LISPRO 100/ML
VIAL (ML) SUBCUTANEOUS AT BEDTIME
Refills: 0 | Status: DISCONTINUED | OUTPATIENT
Start: 2019-11-14 | End: 2019-11-20

## 2019-11-14 RX ORDER — ONDANSETRON 8 MG/1
4 TABLET, FILM COATED ORAL
Refills: 0 | Status: DISCONTINUED | OUTPATIENT
Start: 2019-11-14 | End: 2019-11-14

## 2019-11-14 RX ORDER — VANCOMYCIN HCL 1 G
1250 VIAL (EA) INTRAVENOUS EVERY 12 HOURS
Refills: 0 | Status: DISCONTINUED | OUTPATIENT
Start: 2019-11-14 | End: 2019-11-14

## 2019-11-14 RX ORDER — SODIUM CHLORIDE 9 MG/ML
1000 INJECTION, SOLUTION INTRAVENOUS
Refills: 0 | Status: DISCONTINUED | OUTPATIENT
Start: 2019-11-14 | End: 2019-11-20

## 2019-11-14 RX ORDER — ASPIRIN/CALCIUM CARB/MAGNESIUM 324 MG
81 TABLET ORAL DAILY
Refills: 0 | Status: DISCONTINUED | OUTPATIENT
Start: 2019-11-14 | End: 2019-11-20

## 2019-11-14 RX ORDER — METOPROLOL TARTRATE 50 MG
100 TABLET ORAL DAILY
Refills: 0 | Status: DISCONTINUED | OUTPATIENT
Start: 2019-11-14 | End: 2019-11-20

## 2019-11-14 RX ORDER — GLUCAGON INJECTION, SOLUTION 0.5 MG/.1ML
1 INJECTION, SOLUTION SUBCUTANEOUS ONCE
Refills: 0 | Status: DISCONTINUED | OUTPATIENT
Start: 2019-11-14 | End: 2019-11-20

## 2019-11-14 RX ADMIN — PIPERACILLIN AND TAZOBACTAM 25 GRAM(S): 4; .5 INJECTION, POWDER, LYOPHILIZED, FOR SOLUTION INTRAVENOUS at 05:04

## 2019-11-14 RX ADMIN — Medication 250 MILLIGRAM(S): at 20:51

## 2019-11-14 RX ADMIN — Medication 260 MILLIGRAM(S): at 22:23

## 2019-11-14 RX ADMIN — PIPERACILLIN AND TAZOBACTAM 25 GRAM(S): 4; .5 INJECTION, POWDER, LYOPHILIZED, FOR SOLUTION INTRAVENOUS at 23:03

## 2019-11-14 RX ADMIN — OXYCODONE HYDROCHLORIDE 5 MILLIGRAM(S): 5 TABLET ORAL at 23:12

## 2019-11-14 RX ADMIN — OXYCODONE HYDROCHLORIDE 5 MILLIGRAM(S): 5 TABLET ORAL at 23:42

## 2019-11-14 RX ADMIN — Medication 250 MILLIGRAM(S): at 06:28

## 2019-11-14 RX ADMIN — Medication 100 MILLIGRAM(S): at 05:04

## 2019-11-14 RX ADMIN — SODIUM CHLORIDE 75 MILLILITER(S): 9 INJECTION INTRAMUSCULAR; INTRAVENOUS; SUBCUTANEOUS at 00:13

## 2019-11-14 RX ADMIN — SODIUM CHLORIDE 75 MILLILITER(S): 9 INJECTION INTRAMUSCULAR; INTRAVENOUS; SUBCUTANEOUS at 05:04

## 2019-11-14 RX ADMIN — OXYCODONE HYDROCHLORIDE 5 MILLIGRAM(S): 5 TABLET ORAL at 20:40

## 2019-11-14 RX ADMIN — OXYCODONE HYDROCHLORIDE 5 MILLIGRAM(S): 5 TABLET ORAL at 21:40

## 2019-11-14 RX ADMIN — Medication 650 MILLIGRAM(S): at 23:00

## 2019-11-14 NOTE — BRIEF OPERATIVE NOTE - SPECIMENS
path: dirty 2nd and 3rd partial ray, 2nd met brandon, 3rd met brandon, 3rd proximal phalanx brandon. Culture: deep wound culture, 2nd met brandon, 3rd met brandon and proximal phalanx base brandon

## 2019-11-14 NOTE — PROGRESS NOTE ADULT - PROBLEM SELECTOR PLAN 7
DVT: Lovenox to be held today prior to going to OR  Diet: NPO for OR today  Dispo: PT to evaluate DVT: Lovenox to be held today prior to going to OR  Diet: NPO for OR today

## 2019-11-14 NOTE — DISCHARGE NOTE PROVIDER - NSDCMRMEDTOKEN_GEN_ALL_CORE_FT
Alcohol Swab Pads: Alcohol Swab Pads  Sig: Use as directed  Qty UOM #100  aspirin 81 mg oral delayed release tablet: 1 tab(s) orally once a day  aspirin 81 mg oral delayed release tablet: 1 tab(s) orally once a day  atorvastatin 40 mg oral tablet: 1 tab(s) orally once a day (at bedtime)  atorvastatin 40 mg oral tablet: 1 tab(s) orally once a day (at bedtime)  Bactrim  mg-160 mg oral tablet: 1 tab(s) orally 2 times a day   Basaglar KwikPen 100 units/mL subcutaneous solution: Inject 15 units subcutaneously once per day at bedtime.   clindamycin 300 mg oral capsule: 1 cap(s) orally 3 times a day for 7 days  Glucometer (per patient&#x27;s insurance): Glucometer (per patient&#x27;s insurance)  Sig: Use as directed 4 times a day  Qty UOM: #1 kit  Glucose Test Strips (per patient&#x27;s insurance): Glucose Test Strips (per patient&#x27;s insurance)  Sig: Test blood sugar #4 times a day   Qty UOM #100 strips  HumaLOG KwikPen 200 units/mL (Concentrated) subcutaneous solution: 5 unit(s) subcutaneous 3 times a day (before meals)   Insulin Pen Needles, 4mm: Insulin Pen Needles, 4mm  Sig: Use as directed with insulin pen #4 times a day  Qty UOM #100 pen needles  Lancets: Lancets  Sig: Test blood sugar #4 times a day  Qty UOM #100 lancets  metFORMIN 500 mg oral tablet, extended release: 1 tab(s) orally 4 times a day  metoprolol succinate 100 mg oral tablet, extended release: 1 tab(s) orally once a day  metoprolol succinate 100 mg oral tablet, extended release: 1 tab(s) orally once a day  NovoLOG FlexPen 100 units/mL injectable solution: 10 unit(s) injectable 3 times a day (with meals) Alcohol Swab Pads: Alcohol Swab Pads  Sig: Use as directed  Qty UOM #100  aspirin 81 mg oral delayed release tablet: 1 tab(s) orally once a day  aspirin 81 mg oral delayed release tablet: 1 tab(s) orally once a day  atorvastatin 40 mg oral tablet: 1 tab(s) orally once a day (at bedtime)  atorvastatin 40 mg oral tablet: 1 tab(s) orally once a day (at bedtime)  Bactrim  mg-160 mg oral tablet: 1 tab(s) orally 2 times a day   Basaglar KwikPen 100 units/mL subcutaneous solution: Inject 15 units subcutaneously once per day at bedtime.   cefepime 2 g intravenous injection: 2 gram(s) intravenously 2 times a day for the treatment of Osteomyelitis of the Right Foot (M86).     Follow up with Dr. Uma Ureña (Infectious Disease)  Please continue course until 12/26 to complete 6 weeks.   clindamycin 300 mg oral capsule: 1 cap(s) orally 3 times a day for 7 days  Glucometer (per patient&#x27;s insurance): Glucometer (per patient&#x27;s insurance)  Sig: Use as directed 4 times a day  Qty UOM: #1 kit  Glucose Test Strips (per patient&#x27;s insurance): Glucose Test Strips (per patient&#x27;s insurance)  Sig: Test blood sugar #4 times a day   Qty UOM #100 strips  HumaLOG KwikPen 200 units/mL (Concentrated) subcutaneous solution: 5 unit(s) subcutaneous 3 times a day (before meals)   Insulin Pen Needles, 4mm: Insulin Pen Needles, 4mm  Sig: Use as directed with insulin pen #4 times a day  Qty UOM #100 pen needles  Lancets: Lancets  Sig: Test blood sugar #4 times a day  Qty UOM #100 lancets  metFORMIN 500 mg oral tablet, extended release: 1 tab(s) orally 4 times a day  metoprolol succinate 100 mg oral tablet, extended release: 1 tab(s) orally once a day  metoprolol succinate 100 mg oral tablet, extended release: 1 tab(s) orally once a day  NovoLOG FlexPen 100 units/mL injectable solution: 10 unit(s) injectable 3 times a day (with meals) aspirin 81 mg oral delayed release tablet: 1 tab(s) orally once a day  atorvastatin 40 mg oral tablet: 1 tab(s) orally once a day (at bedtime)  Basaglar KwikPen 100 units/mL subcutaneous solution: Inject 15 units subcutaneously once per day at bedtime.   cefepime 2 g intravenous injection: 2 gram(s) intravenously 2 times a day for the treatment of Osteomyelitis of the Right Foot (M86).     Follow up with Dr. Uma Ureña (Infectious Disease)  Please continue course until 12/26 to complete 6 weeks.   metFORMIN 500 mg oral tablet, extended release: 1 tab(s) orally 4 times a day  metoprolol succinate 100 mg oral tablet, extended release: 1 tab(s) orally once a day  NovoLOG FlexPen 100 units/mL injectable solution: 10 unit(s) injectable 3 times a day (with meals) aspirin 81 mg oral delayed release tablet: 1 tab(s) orally once a day  atorvastatin 40 mg oral tablet: 1 tab(s) orally once a day (at bedtime)  cefepime 2 g intravenous injection: 2 gram(s) intravenously 2 times a day for the treatment of Osteomyelitis of the Right Foot (M86).     Follow up with Dr. Uma Ureña (Infectious Disease)  Please continue course until 12/26 to complete 6 weeks.   metFORMIN 500 mg oral tablet, extended release: 1 tab(s) orally 4 times a day  metoprolol succinate 100 mg oral tablet, extended release: 1 tab(s) orally once a day

## 2019-11-14 NOTE — BRIEF OPERATIVE NOTE - OPERATION/FINDINGS
- high concerns for residual infection because of soft 3rd proximal phalanx base   - no concerns for healing ability

## 2019-11-14 NOTE — DISCHARGE NOTE PROVIDER - CARE PROVIDERS DIRECT ADDRESSES
[email or web address]

## 2019-11-14 NOTE — PRE-ANESTHESIA EVALUATION ADULT - NSRADCARDRESULTSFT_GEN_ALL_CORE
< from: 12 Lead ECG (11.12.19 @ 18:43) >    NORMAL SINUS RHYTHM  LEFT AXIS DEVIATION  RIGHT BUNDLE BRANCH BLOCK  ABNORMAL ECG    < end of copied text >

## 2019-11-14 NOTE — PROGRESS NOTE ADULT - PROBLEM SELECTOR PLAN 1
outpatient MRI with signs of 2nd R toe osteo, +ESR/CRP   -discussed with podiatry attending Dr Light, for OR today for plan for partial 2nd ray resection and 3rd met bone biopsy and/or met head resection, at risk for TMA but patient refusing for now thus will require long term IV antibiotics with PICC/midline, requested ID eval, NPO after MN  -antibiotic as below  --f/u OR cultures  -afebrile, no leukocytosis, HD stable outpatient MRI with signs of 2nd R toe osteo, +ESR/CRP   - podiatry plan for OR today for plan for partial 2nd ray resection and 3rd met bone biopsy and/or met head resection, at risk for TMA but patient refusing for now thus will require long term IV antibiotics with PICC/midline  -antibiotic as below  --f/u OR cultures  -afebrile, no leukocytosis, HD stable

## 2019-11-14 NOTE — PROGRESS NOTE ADULT - ASSESSMENT
Plan:   To OR today RF partial 2nd ray resection w/ 3rd metatarsal head resection w/ Dr Ferguson at 1:30 pm .  CXR on sunrise.  EKG on sunrise.  Medical clearance since 11/13 and documented in chart.  Consent signed and in chart.  Procedure was explained to patient in detail. All alternatives, risks and complications were discussed. All questions answered. Plan:   To OR today RF partial 2nd ray resection w/ 3rd metatarsal head resection and application of antibiotic beads w/ Dr Ferguson at 1:30 pm .  - explained to pt he will likely need IV abx via PICC post operatively depending on bone culture/path  CXR on sunrise.  EKG on sunrise.  Medical clearance since 11/13 and documented in chart.  Consent signed and in chart.  Procedure was explained to patient in detail. All alternatives, risks and complications were discussed. All questions answered.

## 2019-11-14 NOTE — PROGRESS NOTE ADULT - SUBJECTIVE AND OBJECTIVE BOX
Follow Up:  diabetic foot infection and osteo    Interval History: pt stable and afebrile, going to OR today    ROS:      All other systems negative    Constitutional: no fever, no chills  Head: no trauma  Eyes: no vision changes, no eye pain  ENT:  no sore throat, no rhinorrhea  Cardiovascular:  no chest pain, no palpitation  Respiratory:  no SOB, no cough  GI:  no abd pain, no vomiting, no diarrhea  urinary: no dysuria, no hematuria, no flank pain  musculoskeletal:  R foot edema, erythema and pain  skin:  no rash  neurology:  no headache, no seizure  psych: no anxiety, no depression         Allergies  No Known Allergies        ANTIMICROBIALS:  piperacillin/tazobactam IVPB.. 3.375 every 8 hours  vancomycin  IVPB 1000 every 12 hours      OTHER MEDS:  acetaminophen   Tablet .. 650 milliGRAM(s) Oral every 6 hours PRN  acetaminophen   Tablet .. 650 milliGRAM(s) Oral every 6 hours PRN  aspirin enteric coated 81 milliGRAM(s) Oral daily  dextrose 40% Gel 15 Gram(s) Oral once PRN  dextrose 5%. 1000 milliLiter(s) IV Continuous <Continuous>  dextrose 50% Injectable 12.5 Gram(s) IV Push once  dextrose 50% Injectable 25 Gram(s) IV Push once  dextrose 50% Injectable 25 Gram(s) IV Push once  glucagon  Injectable 1 milliGRAM(s) IntraMuscular once PRN  insulin lispro (HumaLOG) corrective regimen sliding scale   SubCutaneous three times a day before meals  insulin lispro (HumaLOG) corrective regimen sliding scale   SubCutaneous at bedtime  metoprolol succinate  milliGRAM(s) Oral daily  oxyCODONE    IR 5 milliGRAM(s) Oral every 4 hours PRN  sodium chloride 0.9%. 1000 milliLiter(s) IV Continuous <Continuous>      Vital Signs Last 24 Hrs  T(C): 36.7 (2019 01:03), Max: 36.7 (2019 01:03)  T(F): 98 (2019 01:03), Max: 98 (2019 01:03)  HR: 63 (2019 01:03) (62 - 67)  BP: 139/74 (2019 01:03) (138/71 - 151/77)  BP(mean): --  RR: 19 (2019 01:03) (18 - 19)  SpO2: 98% (2019 01:03) (97% - 99%)    Physical Exam:  General:    NAD, non toxic  Head: atraumatic, normocephalic  Eyes: normal sclera and conjunctiva  ENT:   no oropharyngeal lesions, no LAD, neck supple  Cardio:   sternotomy scar, regular S1,S2, no murmur  Respiratory:   clear b/l, no wheezing  abd:   soft, BS +, not tender, no hepatosplenomegaly  :     no CVAT, no suprapubic tenderness, no eduardo  Musculoskeletal : s/p L 2nd partial toe amp, R partial 2nd amp with edema, erythema, warmth and tenseness of the foot, no draining wound  Skin:    no rash  vascular: no phlebitis, normal pulses  Neurologic:     no focal deficits  psych: normal affect                          12.4   5.65  )-----------( 159      ( 2019 05:11 )             36.2       -    141  |  104  |  16  ----------------------------<  126<H>  4.3   |  24  |  0.78    Ca    8.9      2019 05:11  Phos  3.2       Mg     2.0         TPro  8.4<H>  /  Alb  4.6  /  TBili  0.4  /  DBili  x   /  AST  15  /  ALT  16  /  AlkPhos  59        Urinalysis Basic - ( 2019 18:42 )    Color: Light Yellow / Appearance: Clear / S.015 / pH: x  Gluc: x / Ketone: Negative  / Bili: Negative / Urobili: Negative   Blood: x / Protein: Negative / Nitrite: Negative   Leuk Esterase: Negative / RBC: 9 /hpf / WBC 1 /HPF   Sq Epi: x / Non Sq Epi: 0 /hpf / Bacteria: Negative        MICROBIOLOGY:  Vancomycin Level, Trough: 9.3 ug/mL (19 @ 05:11)  v  .Blood Blood-Peripheral  19   No growth to date.  --  --      .Urine Clean Catch (Midstream)  19   No growth  --  --                RADIOLOGY:  Images below reviewed personally  < from: Xray Foot AP + Lateral + Oblique, Right (19 @ 19:29) >  IMPRESSION:  Bony destruction and lucency at the base of the proximal   phalanges of the second and third digits as well as the distal aspects of   the second and third metatarsals consistent with osteomyelitis.    Status post amputation of the second ray at the level of the midshaft of   the proximal phalanx.    Vascular calcifications.

## 2019-11-14 NOTE — DISCHARGE NOTE PROVIDER - CARE PROVIDER_API CALL
Jorge Luis Ferguson (DPM)  Podiatric Medicine and Surgery  [address] [place] [address]  Phone: [phone]  Fax: [phone]  Follow Up Time: 1 week Jorge Luis Ferguson (DPM)  Podiatric Medicine and Surgery  [address] [place] [address]  Phone: [phone]  Fax: [phone]  Follow Up Time: 1 week    Uma Ureña)  Internal Medicine  [address] [place] [address]  Phone: [phone]  Fax: [phone]  Follow Up Time: 1 month Jorge Luis Ferguson (DPM)  Podiatric Medicine and Surgery  [address] [place] [address]  Phone: [phone]  Fax: [phone]  Established Patient  Follow Up Time: 1 week    Uma Ureña)  Internal Medicine  [address] [place] [address]  Phone: [phone]  Fax: [phone]  Established Patient  Follow Up Time: 1 month    Chirag Cedeno)  Internal Medicine  [address] [place] [address]  Phone: [phone]  Fax: [phone]  Established Patient  Follow Up Time: 1 week

## 2019-11-14 NOTE — PRE-ANESTHESIA EVALUATION ADULT - NSANTHOSAYNRD_GEN_A_CORE
No. RINKU screening performed.  STOP BANG Legend: 0-2 = LOW Risk; 3-4 = INTERMEDIATE Risk; 5-8 = HIGH Risk

## 2019-11-14 NOTE — DISCHARGE NOTE PROVIDER - NSDCCPCAREPLAN_GEN_ALL_CORE_FT
PRINCIPAL DISCHARGE DIAGNOSIS  Diagnosis: Osteomyelitis  Assessment and Plan of Treatment: In the hospital, you were treated for a condition known as osteomyelitis, which is an infection of the bone. This infection likely occured due to slow healing of a wound or surgical site after your previous toe surgery. The most likely cause of your slow healing is likely due to diabetes, which can impair healing processes and reduce blood flow to your extremities. During this admission you were seen by our podiatry specialists who performed additional resections and removed portions of bone that they believed were infected. We have also tested several of these samples of bone for infection in order to identify the organism causing the infection. In order to complete your treatment course, you will require several weeks of intravenous antibiotics which are to be administered via the PICC line that we placed in the hospital. You will require close follow up with your podiatrist as well as the infectious disease doctors to monitor for improvement in your bone infection and make any neccessary adjustments to your antibiotic regimen. Please follow up with both podiatry and infectious disease within 1-2 weeks of hospital discharge. Please also follow up with your primary care doctor within 2 weeks of discharge for routine care and health maintenance.      SECONDARY DISCHARGE DIAGNOSES  Diagnosis: Type 2 diabetes mellitus with other circulatory complication, without long-term current use of insulin  Assessment and Plan of Treatment: The most likely underlying cause of your recurrent foot infections is diabetes. Diabetes can impair your body's ability to heal itself and can reduce blood flow to your hands and feet. Please be sure to check your feet regularly for any wounds or cuts and clean them appropriately. Please follow up with your primary care doctor and podiatrists for routine foot exams. Be sure to continue to take medications for your diabetes and stick to a healthy diet.

## 2019-11-14 NOTE — BRIEF OPERATIVE NOTE - NSICDXBRIEFPROCEDURE_GEN_ALL_CORE_FT
PROCEDURES:  Amputation of toe of right foot 14-Nov-2019 17:53:16 right partial 2nd ray amp and 3rd MTPJ arthroplasty Leidy Wright

## 2019-11-14 NOTE — PRE-ANESTHESIA EVALUATION ADULT - NSANTHPMHFT_GEN_ALL_CORE
recent outpatient MRI showing R 2nd digit OM and foot cellulitis s/p short course of clindamycin with no improvement

## 2019-11-14 NOTE — PROGRESS NOTE ADULT - PROBLEM SELECTOR PLAN 2
mild cellulitis to R midfoot with osteo as above  - c/w vanc 1 gram q12, monitor vanc trough  - per ID, switch to zosyn 3.375gram q8  -pain control with tylenol and oxy prn mild cellulitis to R midfoot with osteo, cellulitis improving  - c/w vanc 1 gram q12, monitor vanc trough, 9.3  - per ID, c/w zosyn 3.375gram q8  -discussed with ID and podiatry, ok to get PICC line, discussed r/b/a of PICC line with pt/wife who wish to proceed  -pain control with tylenol and oxy prn

## 2019-11-14 NOTE — DISCHARGE NOTE PROVIDER - PROVIDER TOKENS
PROVIDER:[TOKEN:[[id number]],FOLLOWUP:[1 week]] PROVIDER:[TOKEN:[[id number]],FOLLOWUP:[1 week]],PROVIDER:[TOKEN:[[id number]],FOLLOWUP:[1 month]] PROVIDER:[TOKEN:[[id number]],FOLLOWUP:[1 week],ESTABLISHEDPATIENT:[T]],PROVIDER:[TOKEN:[[id number]],FOLLOWUP:[1 month],ESTABLISHEDPATIENT:[T]],PROVIDER:[TOKEN:[[id number]],FOLLOWUP:[1 week],ESTABLISHEDPATIENT:[T]]

## 2019-11-14 NOTE — BRIEF OPERATIVE NOTE - DISPOSITION
My Surgical Experience    The following information was developed to assist you to prepare for your operation  What do I need to do before coming to the hospital?   Arrange for a responsible person to drive you to and from the hospital    Arrange care for your children at home  Children are not allowed in the recovery areas of the hospital   Plan to wear clothing that is easy to put on and take off  If you are having shoulder surgery, wear a shirt that buttons or zippers in the front  Bathing  o Shower the evening before and the morning of your surgery with an antibacterial soap  Please refer to the Pre Op Showering Instructions for Surgery Patients Sheet   o Remove nail polish and all body piercing jewelry  o Do not shave any body part for at least 24 hours before surgery-this includes face, arms, legs and upper body  Food  o Nothing to eat or drink after midnight the night before your surgery  This includes candy and chewing gum  o Exception: If your surgery is after 12:00pm (noon), you may have clear liquids such as 7-Up®, ginger ale, apple or cranberry juice, Jell-O®, water, or clear broth until 8:00 am  o Do not drink milk or juice with pulp on the morning before surgery  o Do not drink alcohol 24 hours before surgery  Medicine  o Follow instructions you received from your surgeon about which medicines you may take on the day of surgery  o If instructed to take medicine on the morning of surgery, take pills with just a small sip of water  Call your prescribing doctor for specific infroamtion on what to do if you take insulin    What should I bring to the hospital?    Bring:  Gopal Sutherland or a walker, if you have them, for foot or knee surgery   A list of the daily medicines, vitamins, minerals, herbals and nutritional supplements you take   Include the dosages of medicines and the time you take them each day   Glasses, dentures or hearing aids   Minimal clothing; you will be wearing hospital sleepwear   Photo ID; required to verify your identity   If you have a Living Will or Power of , bring a copy of the documents   If you have an ostomy, bring an extra pouch and any supplies you use    Do not bring   Medicines or inhalers   Money, valuables or jewelry    What other information should I know about the day of surgery?  Notify your surgeons if you develop a cold, sore throat, cough, fever, rash or any other illness   Report to the Ambulatory Surgical/Same Day Surgery Unit   You will be instructed to stop at Registration only if you have not been pre-registered   Inform your  fi they do not stay that they will be asked by the staff to leave a phone number where they can be reached   Be available to be reached before surgery  In the event the operating room schedule changes, you may be asked to come in earlier or later than expected    *It is important to tell your doctor and others involved in your health care if you are taking or have been taking any non-prescription drugs, vitamins, minerals, herbals or other nutritional supplements  Any of these may interact with some food or medicines and cause a reaction      Pre-Surgery Instructions:   Medication Instructions    amLODIPine (NORVASC) 10 mg tablet Instructed patient per Anesthesia Guidelines   cholecalciferol (VITAMIN D3) 1,000 units tablet Instructed patient per Anesthesia Guidelines   hydrochlorothiazide (HYDRODIURIL) 12 5 mg tablet Instructed patient per Anesthesia Guidelines   metoprolol tartrate (LOPRESSOR) 25 mg tablet Instructed patient per Anesthesia Guidelines  To take metoprolol, and amlodipine a m   Of surgery PACU then floor

## 2019-11-14 NOTE — DISCHARGE NOTE PROVIDER - NSDCFUADDAPPT_GEN_ALL_CORE_FT
Please be sure to follow up with your podiatrist within 1 week of discharge for wound monitoring and for dressing changes.     Please be sure to follow up with the infectious disease doctor within 1 month of discharge to see if your antibiotics require any adjustment.

## 2019-11-14 NOTE — PROGRESS NOTE ADULT - ASSESSMENT
74 m with DM, HTN, CAD s/p CABG, previous L 2nd toe amp, developed R toe swelling and pain 9/2019 s/p partial amputation but never improved and for the last week had worsening edema and pain, an outside xray was s/o osteo, he was started on clinda and when the MRI showed osteo and pt did not improve, came to ER. He denied fever, chills, diarrhea   here afebrile, WBC normal, ESR: 28, CRP: 0.62  xray: Bony destruction and lucency at the base of the proximal phalanges of the second and third digits as well as the distal aspects of the second and third metatarsals consistent with osteomyelitis.    diabetic foot infection, R foot cellulitis and osteomyelitis (had partial 2nd toe amp 9/2019 with worsening R foot edema, erythema and pain)  blood cx negative    * c/w vanco 1 q 12 and check the trough tomorrow  * c/w zosyn 3.375 q 8 for now, will adjust as per the cultures  * plan for OR tomorrow, will follow the OR cultures  * podiatry believes after 2nd ray resection there would still be residual infection and pt will need more antibiotics

## 2019-11-14 NOTE — DISCHARGE NOTE PROVIDER - NSDCFUADDINST_GEN_ALL_CORE_FT
Podiatry Discharge Instructions:  Follow up: Please follow up with Dr. Ferguson/ Kirby within 1 week of discharge from the hospital, please call [phone] for appointment and discuss that you recently were seen in the hospital.  Wound Care: Please have your right foot dressing change once a week with 4x4 gauze, dontrell and ACE   Weight bearing: Please weight bear as tolerated in a surgical shoe.  Antibiotics: Please continue as instructed.

## 2019-11-14 NOTE — PROGRESS NOTE ADULT - ASSESSMENT
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 now presenting for worsening right foot pain with recent outpatient MRI showing R 2nd digit OM and foot cellulitis now s/p short course of clindamycin with no improvement, podiatric surgery tomorrow and likely long term IV antibiotics

## 2019-11-14 NOTE — PRE-ANESTHESIA EVALUATION ADULT - NSANTHPEFT_GEN_ALL_CORE
PHYSICAL EXAM:  GENERAL: NAD, well-developed/nourished and groomed, afebrile  HEAD:  Atraumatic, Normocephalic  EYES: EOMI, PERRLA, conjunctiva and sclera clear  NECK: Supple, No JVD, no thyroid enlargement  CHEST/LUNG: Clear to auscultation bilaterally; No wheeze/rhonchi/rales  HEART: Regular rate and rhythm; No murmurs, rubs, or gallops  EXTREMITIES:  2+ Peripheral Pulses, No clubbing, cyanosis, or edema  PSYCH: appropriate mood and affect for age and condition  NEUROLOGY: AAO*3, non-focal  SKIN: No rashes or lesions

## 2019-11-14 NOTE — PROGRESS NOTE ADULT - SUBJECTIVE AND OBJECTIVE BOX
Podiatry Pager #: [phone]    Patient is a 74y old  Male who presents with a chief complaint of Persistent RIGHT forefoot pain, swelling for the past 6 days but worsening since 19 (2019 07:25)      INTERVAL HPI/OVERNIGHT EVENTS:   Pt is scheduled for RF partial 2nd ray resection w/ 3rd metatarsal head resection w/ Dr Ferguson at 1:30 pm .  Patient is aware of procedure and is NPO since midnight.    MEDICATIONS  (STANDING):  aspirin enteric coated 81 milliGRAM(s) Oral daily  dextrose 5%. 1000 milliLiter(s) (50 mL/Hr) IV Continuous <Continuous>  dextrose 50% Injectable 12.5 Gram(s) IV Push once  dextrose 50% Injectable 25 Gram(s) IV Push once  dextrose 50% Injectable 25 Gram(s) IV Push once  insulin lispro (HumaLOG) corrective regimen sliding scale   SubCutaneous three times a day before meals  insulin lispro (HumaLOG) corrective regimen sliding scale   SubCutaneous at bedtime  metoprolol succinate  milliGRAM(s) Oral daily  piperacillin/tazobactam IVPB.. 3.375 Gram(s) IV Intermittent every 8 hours  sodium chloride 0.9%. 1000 milliLiter(s) (75 mL/Hr) IV Continuous <Continuous>  vancomycin  IVPB 1000 milliGRAM(s) IV Intermittent every 12 hours    MEDICATIONS  (PRN):  acetaminophen   Tablet .. 650 milliGRAM(s) Oral every 6 hours PRN Temp greater or equal to 38C (100.4F)  acetaminophen   Tablet .. 650 milliGRAM(s) Oral every 6 hours PRN Moderate Pain (4 - 6)  dextrose 40% Gel 15 Gram(s) Oral once PRN Blood Glucose LESS THAN 70 milliGRAM(s)/deciliter  glucagon  Injectable 1 milliGRAM(s) IntraMuscular once PRN Glucose LESS THAN 70 milligrams/deciliter  oxyCODONE    IR 5 milliGRAM(s) Oral every 4 hours PRN Moderate Pain (4 - 6)      Allergies    No Known Allergies    Intolerances        Vital Signs Last 24 Hrs  T(C): 36.7 (2019 01:03), Max: 36.7 (2019 01:03)  T(F): 98 (2019 01:03), Max: 98 (2019 01:03)  HR: 63 (2019 01:03) (62 - 67)  BP: 139/74 (2019 01:03) (138/71 - 151/77)  BP(mean): --  RR: 19 (2019 01:03) (18 - 19)  SpO2: 98% (2019 01:03) (97% - 99%)    LABS:                        12.4   5.65  )-----------( 159      ( 2019 05:11 )             36.2     -    141  |  104  |  16  ----------------------------<  126<H>  4.3   |  24  |  0.78    Ca    8.9      2019 05:11  Phos  3.2     -  Mg     2.0         TPro  8.4<H>  /  Alb  4.6  /  TBili  0.4  /  DBili  x   /  AST  15  /  ALT  16  /  AlkPhos  59  11-12    PT/INR - ( 2019 05:11 )   PT: 12.3 sec;   INR: 1.08 ratio         PTT - ( 2019 05:11 )  PTT:31.2 sec  Urinalysis Basic - ( 2019 18:42 )    Color: Light Yellow / Appearance: Clear / S.015 / pH: x  Gluc: x / Ketone: Negative  / Bili: Negative / Urobili: Negative   Blood: x / Protein: Negative / Nitrite: Negative   Leuk Esterase: Negative / RBC: 9 /hpf / WBC 1 /HPF   Sq Epi: x / Non Sq Epi: 0 /hpf / Bacteria: Negative      CAPILLARY BLOOD GLUCOSE      POCT Blood Glucose.: 135 mg/dL (2019 06:10)  POCT Blood Glucose.: 152 mg/dL (2019 21:49)  POCT Blood Glucose.: 114 mg/dL (2019 17:49)  POCT Blood Glucose.: 118 mg/dL (2019 13:27)      RADIOLOGY & ADDITIONAL TESTS:

## 2019-11-14 NOTE — PROGRESS NOTE ADULT - PROBLEM SELECTOR PLAN 3
RCRI score of 1 due to CAD w/ CABG Which represents at least 6% risk of death, MI or cardiac arrest within 30 days. Patient is moderate risk for a low to moderate risk procedure.   greats METs >10, works out 3 times a weeks, no limitation to functional status denies CP, SOB,    -Recent EKG performed on 11/12 showed NSR with right bundle branch block which is chronic. Also reports have "normal echo" a few months with his cardiologist Dr Allen. CXR clear. Furthermore, patient with recent podiatry surgery in September with no previous surgical or anesthetic complications.  -currently medically optimized for podiatric surgery of right foot.

## 2019-11-14 NOTE — DISCHARGE NOTE PROVIDER - HOSPITAL COURSE
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 The patient had a recent outpatient MRI showing right 2nd digit osteomyelitis and right foot cellulitis with no improvement after outpatient course of antibiotics. The patient underwent partial right 2nd ray amputation and amputation of right 3rd metatarsophalangeal joint by podiatry on 11/14. Bone cultures were sent at the time and showed _______. The patient had a PICC line placed on 11/16 with plan for outpatient IV antibiotics for ____ weeks. The patient was seen by infectious disease with initial plan to start with vancomycin and zosyn with final plan to switch to IV ______ and ______ for remainder of antibiotic course. He will require close outpatient follow up with podiatry and infectious disease for wound care and adjustment of antibiotic regimen. 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 The patient had a recent outpatient MRI showing right 2nd digit osteomyelitis and right foot cellulitis with no improvement after outpatient course of antibiotics. The patient underwent partial right 2nd ray amputation and amputation of right 3rd metatarsophalangeal joint by podiatry on 11/14. Bone cultures were sent at the time and showed _______. The patient had a PICC line placed on 11/16 with plan for outpatient IV antibiotics for ____ weeks. The patient was seen by infectious disease with initial plan to start with vancomycin and zosyn with final plan to switch to IV ______ and ______ for remainder of antibiotic course. He will require close outpatient follow up with podiatry and infectious disease for wound care and adjustment of antibiotic regimen.        The patient was seen and examined and is currently hemodynamically stable and medically safe for discharge to home. He is scheduled for close follow up with podiatry and primary care. 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 The patient had a recent outpatient MRI showing right 2nd digit osteomyelitis and right foot cellulitis with no improvement after outpatient course of antibiotics. The patient underwent partial right 2nd ray amputation and amputation of right 3rd metatarsophalangeal joint by podiatry on 11/14. Bone and tissue cultures were sent at the time and showed pseudomonas. The patient had a PICC line placed on 11/16 with plan for outpatient IV antibiotics for 6 weeks. The patient was seen by infectious disease with initial plan to start with vancomycin and zosyn with final plan to switch to IV cefepime 2g BID until 12/26. He will require close outpatient follow up with podiatry and infectious disease for wound care and adjustment of antibiotic regimen.        The patient was seen and examined and is currently hemodynamically stable and medically safe for discharge to home. He is scheduled for close follow up with podiatry and primary care.

## 2019-11-14 NOTE — PROGRESS NOTE ADULT - SUBJECTIVE AND OBJECTIVE BOX
Patient is a 74y old  Male who presents with a chief complaint of Persistent RIGHT forefoot pain, swelling for the past 6 days but worsening since 9/28/19 (13 Nov 2019 14:15)     INTERVAL HPI/OVERNIGHT EVENTS: No acute events overnight.    SUBJECTIVE: Pt seen and examined this morning.    MEDICATIONS  (STANDING):  aspirin enteric coated 81 milliGRAM(s) Oral daily  dextrose 5%. 1000 milliLiter(s) (50 mL/Hr) IV Continuous <Continuous>  dextrose 50% Injectable 12.5 Gram(s) IV Push once  dextrose 50% Injectable 25 Gram(s) IV Push once  dextrose 50% Injectable 25 Gram(s) IV Push once  insulin lispro (HumaLOG) corrective regimen sliding scale   SubCutaneous three times a day before meals  insulin lispro (HumaLOG) corrective regimen sliding scale   SubCutaneous at bedtime  metoprolol succinate  milliGRAM(s) Oral daily  piperacillin/tazobactam IVPB.. 3.375 Gram(s) IV Intermittent every 8 hours  sodium chloride 0.9%. 1000 milliLiter(s) (75 mL/Hr) IV Continuous <Continuous>  vancomycin  IVPB 1000 milliGRAM(s) IV Intermittent every 12 hours    MEDICATIONS  (PRN):  acetaminophen   Tablet .. 650 milliGRAM(s) Oral every 6 hours PRN Temp greater or equal to 38C (100.4F)  acetaminophen   Tablet .. 650 milliGRAM(s) Oral every 6 hours PRN Moderate Pain (4 - 6)  dextrose 40% Gel 15 Gram(s) Oral once PRN Blood Glucose LESS THAN 70 milliGRAM(s)/deciliter  glucagon  Injectable 1 milliGRAM(s) IntraMuscular once PRN Glucose LESS THAN 70 milligrams/deciliter  oxyCODONE    IR 5 milliGRAM(s) Oral every 4 hours PRN Moderate Pain (4 - 6)    Allergies    No Known Allergies    Intolerances    REVIEW OF SYSTEMS:  CONSTITUTIONAL: No fever, weight loss, or fatigue  EYES: No eye pain, visual disturbances, or discharge  ENMT:  No difficulty hearing, tinnitus, vertigo; No sinus or throat pain  RESPIRATORY: No cough, wheezing, chills or hemoptysis; No shortness of breath  CARDIOVASCULAR: No chest pain, palpitations, dizziness, or leg swelling  GASTROINTESTINAL: No abdominal or epigastric pain. No nausea, vomiting, or hematemesis; No diarrhea or constipation. No melena or hematochezia.  GENITOURINARY: No dysuria, frequency, hematuria, or incontinence  NEUROLOGICAL: No headaches, loss of strength, numbness, or tremors  SKIN: No itching, burning, rashes, or lesions   LYMPH NODES: No enlarged glands  ENDOCRINE: No heat or cold intolerance; No polydipsia or polyuria  MUSCULOSKELETAL: No joint pain or swelling;   PSYCHIATRIC: Denies depression, anxiety  HEME/LYMPH: No easy bruising, or bleeding gums  ALLERGY AND IMMUNOLOGIC: No hives or eczema    Vital Signs Last 24 Hrs  T(C): 36.7 (14 Nov 2019 01:03), Max: 36.7 (14 Nov 2019 01:03)  T(F): 98 (14 Nov 2019 01:03), Max: 98 (14 Nov 2019 01:03)  HR: 63 (14 Nov 2019 01:03) (62 - 67)  BP: 139/74 (14 Nov 2019 01:03) (137/74 - 151/77)  BP(mean): --  RR: 19 (14 Nov 2019 01:03) (18 - 19)  SpO2: 98% (14 Nov 2019 01:03) (97% - 99%)    PHYSICAL EXAM:  GENERAL: NAD, well-developed  HEENT: NC/AT,  NECK: Supple, No JVD, no carotid bruits b/l  CHEST/LUNG: Clear to auscultation bilaterally; No rales, rhonchi, wheezing, or rubs  CARD: Regular rate and rhythm; No murmurs, rubs, or gallops. No LE edema  ABDOMEN: Soft, Nontender, Nondistended; Bowel sounds present  EXTREMITIES:  2+ Peripheral Pulses, 2nd toes on both feet resected. Right foot appears more swollen than Left. R second toe and metatarsal joint with redness and tenderness to palpation. No gangrene or discharge.  SKIN: No rashes or lesions  NEURO: AOx3, moving all extremities    LABS:                        12.4   5.65  )-----------( 159      ( 14 Nov 2019 05:11 )             36.2     14 Nov 2019 05:11    141    |  104    |  16     ----------------------------<  126    4.3     |  24     |  0.78     Ca    8.9        14 Nov 2019 05:11  Phos  3.2       14 Nov 2019 05:11  Mg     2.0       14 Nov 2019 05:11    PT/INR - ( 14 Nov 2019 05:11 )   PT: 12.3 sec;   INR: 1.08 ratio      PTT - ( 14 Nov 2019 05:11 )  PTT:31.2 sec  CAPILLARY BLOOD GLUCOSE    POCT Blood Glucose.: 135 mg/dL (14 Nov 2019 06:10)  POCT Blood Glucose.: 152 mg/dL (13 Nov 2019 21:49)  POCT Blood Glucose.: 114 mg/dL (13 Nov 2019 17:49)  POCT Blood Glucose.: 118 mg/dL (13 Nov 2019 13:27)  POCT Blood Glucose.: 132 mg/dL (13 Nov 2019 08:15)    BLOOD CULTURE  11-12 @ 23:11   No growth to date.  --  --  11-12 @ 22:06   No growth  --  --    RADIOLOGY & ADDITIONAL TESTS:  Foot XR 11/12:  Bony destruction and lucency at the base of the proximal phalanges of the second and third digits as well as the distal aspects of the second and third metatarsals consistent with osteomyelitis.    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    Vascular calcifications.    Imaging Personally Reviewed:  [ ] YES     Consultant(s) Notes Reviewed:      Care Discussed with Consultants/Other Providers: Patient is a 74y old  Male who presents with a chief complaint of Persistent RIGHT forefoot pain, swelling for the past 6 days but worsening since 9/28/19 (13 Nov 2019 14:15)     INTERVAL HPI/OVERNIGHT EVENTS: No acute events overnight.    SUBJECTIVE: Pt seen and examined this morning. Denies foot pain, fever/chills, cp, sob.    MEDICATIONS  (STANDING):  aspirin enteric coated 81 milliGRAM(s) Oral daily  dextrose 5%. 1000 milliLiter(s) (50 mL/Hr) IV Continuous <Continuous>  dextrose 50% Injectable 12.5 Gram(s) IV Push once  dextrose 50% Injectable 25 Gram(s) IV Push once  dextrose 50% Injectable 25 Gram(s) IV Push once  insulin lispro (HumaLOG) corrective regimen sliding scale   SubCutaneous three times a day before meals  insulin lispro (HumaLOG) corrective regimen sliding scale   SubCutaneous at bedtime  metoprolol succinate  milliGRAM(s) Oral daily  piperacillin/tazobactam IVPB.. 3.375 Gram(s) IV Intermittent every 8 hours  sodium chloride 0.9%. 1000 milliLiter(s) (75 mL/Hr) IV Continuous <Continuous>  vancomycin  IVPB 1000 milliGRAM(s) IV Intermittent every 12 hours    MEDICATIONS  (PRN):  acetaminophen   Tablet .. 650 milliGRAM(s) Oral every 6 hours PRN Temp greater or equal to 38C (100.4F)  acetaminophen   Tablet .. 650 milliGRAM(s) Oral every 6 hours PRN Moderate Pain (4 - 6)  dextrose 40% Gel 15 Gram(s) Oral once PRN Blood Glucose LESS THAN 70 milliGRAM(s)/deciliter  glucagon  Injectable 1 milliGRAM(s) IntraMuscular once PRN Glucose LESS THAN 70 milligrams/deciliter  oxyCODONE    IR 5 milliGRAM(s) Oral every 4 hours PRN Moderate Pain (4 - 6)        Vital Signs Last 24 Hrs  T(C): 36.7 (14 Nov 2019 01:03), Max: 36.7 (14 Nov 2019 01:03)  T(F): 98 (14 Nov 2019 01:03), Max: 98 (14 Nov 2019 01:03)  HR: 63 (14 Nov 2019 01:03) (62 - 67)  BP: 139/74 (14 Nov 2019 01:03) (137/74 - 151/77)  BP(mean): --  RR: 19 (14 Nov 2019 01:03) (18 - 19)  SpO2: 98% (14 Nov 2019 01:03) (97% - 99%)    PHYSICAL EXAM:  GENERAL: NAD, well-developed  HEENT: NC/AT,  NECK: Supple, No JVD, no carotid bruits b/l  CHEST/LUNG: Clear to auscultation bilaterally; No rales, rhonchi, wheezing, or rubs  CARD: Regular rate and rhythm; No murmurs, rubs, or gallops. No LE edema  ABDOMEN: Soft, Nontender, Nondistended; Bowel sounds present  EXTREMITIES:  2+ Peripheral Pulses, 2nd toes on both feet resected. Right foot appears more swollen than Left. R second toe and metatarsal joint with redness and tenderness to palpation. No gangrene or discharge.  SKIN: No rashes or lesions  NEURO: AOx3, moving all extremities    LABS:                        12.4   5.65  )-----------( 159      ( 14 Nov 2019 05:11 )             36.2     14 Nov 2019 05:11    141    |  104    |  16     ----------------------------<  126    4.3     |  24     |  0.78     Ca    8.9        14 Nov 2019 05:11  Phos  3.2       14 Nov 2019 05:11  Mg     2.0       14 Nov 2019 05:11    PT/INR - ( 14 Nov 2019 05:11 )   PT: 12.3 sec;   INR: 1.08 ratio      PTT - ( 14 Nov 2019 05:11 )  PTT:31.2 sec  CAPILLARY BLOOD GLUCOSE    POCT Blood Glucose.: 135 mg/dL (14 Nov 2019 06:10)  POCT Blood Glucose.: 152 mg/dL (13 Nov 2019 21:49)  POCT Blood Glucose.: 114 mg/dL (13 Nov 2019 17:49)  POCT Blood Glucose.: 118 mg/dL (13 Nov 2019 13:27)  POCT Blood Glucose.: 132 mg/dL (13 Nov 2019 08:15)    BLOOD CULTURE  11-12 @ 23:11   No growth to date.  --  --  11-12 @ 22:06   No growth  --  --    RADIOLOGY & ADDITIONAL TESTS:  Foot XR 11/12:  Bony destruction and lucency at the base of the proximal phalanges of the second and third digits as well as the distal aspects of the second and third metatarsals consistent with osteomyelitis.    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    Vascular calcifications.    Imaging Personally Reviewed:  [ ] YES     Consultant(s) Notes Reviewed:      Care Discussed with Consultants/Other Providers:

## 2019-11-14 NOTE — PROGRESS NOTE ADULT - PROBLEM SELECTOR PROBLEM 4
Type 2 diabetes mellitus with other circulatory complication, without long-term current use of insulin Complex Repair And Double Advancement Flap Text: The defect edges were debeveled with a #15 scalpel blade.  The primary defect was closed partially with a complex linear closure.  Given the location of the remaining defect, shape of the defect and the proximity to free margins a double advancement flap was deemed most appropriate for complete closure of the defect.  Using a sterile surgical marker, an appropriate advancement flap was drawn incorporating the defect and placing the expected incisions within the relaxed skin tension lines where possible.    The area thus outlined was incised deep to adipose tissue with a #15 scalpel blade.  The skin margins were undermined to an appropriate distance in all directions utilizing iris scissors.

## 2019-11-14 NOTE — DISCHARGE NOTE PROVIDER - NSDCHHHOMEBOUNDOTHER_GEN_ALL_CORE_FT
Pt with poorly healing foot wound, now s/p podiatric surgery. Will require wound care and dressing changes.

## 2019-11-15 PROBLEM — I10 ESSENTIAL (PRIMARY) HYPERTENSION: Chronic | Status: ACTIVE | Noted: 2019-05-01

## 2019-11-15 PROBLEM — I25.10 ATHEROSCLEROTIC HEART DISEASE OF NATIVE CORONARY ARTERY WITHOUT ANGINA PECTORIS: Chronic | Status: ACTIVE | Noted: 2019-05-01

## 2019-11-15 LAB
ANION GAP SERPL CALC-SCNC: 6 MMOL/L — SIGNIFICANT CHANGE UP (ref 5–17)
BUN SERPL-MCNC: 12 MG/DL — SIGNIFICANT CHANGE UP (ref 7–23)
CALCIUM SERPL-MCNC: 9 MG/DL — SIGNIFICANT CHANGE UP (ref 8.4–10.5)
CHLORIDE SERPL-SCNC: 101 MMOL/L — SIGNIFICANT CHANGE UP (ref 96–108)
CO2 SERPL-SCNC: 30 MMOL/L — SIGNIFICANT CHANGE UP (ref 22–31)
CREAT SERPL-MCNC: 0.93 MG/DL — SIGNIFICANT CHANGE UP (ref 0.5–1.3)
GLUCOSE SERPL-MCNC: 115 MG/DL — HIGH (ref 70–99)
GRAM STN FLD: SIGNIFICANT CHANGE UP
HCT VFR BLD CALC: 37 % — LOW (ref 39–50)
HGB BLD-MCNC: 12.2 G/DL — LOW (ref 13–17)
MAGNESIUM SERPL-MCNC: 2.2 MG/DL — SIGNIFICANT CHANGE UP (ref 1.6–2.6)
MCHC RBC-ENTMCNC: 30 PG — SIGNIFICANT CHANGE UP (ref 27–34)
MCHC RBC-ENTMCNC: 33 GM/DL — SIGNIFICANT CHANGE UP (ref 32–36)
MCV RBC AUTO: 91.1 FL — SIGNIFICANT CHANGE UP (ref 80–100)
NRBC # BLD: 0 /100 WBCS — SIGNIFICANT CHANGE UP (ref 0–0)
PHOSPHATE SERPL-MCNC: 3.3 MG/DL — SIGNIFICANT CHANGE UP (ref 2.5–4.5)
PLATELET # BLD AUTO: 167 K/UL — SIGNIFICANT CHANGE UP (ref 150–400)
POTASSIUM SERPL-MCNC: 3.8 MMOL/L — SIGNIFICANT CHANGE UP (ref 3.5–5.3)
POTASSIUM SERPL-SCNC: 3.8 MMOL/L — SIGNIFICANT CHANGE UP (ref 3.5–5.3)
RBC # BLD: 4.06 M/UL — LOW (ref 4.2–5.8)
RBC # FLD: 12.9 % — SIGNIFICANT CHANGE UP (ref 10.3–14.5)
SODIUM SERPL-SCNC: 137 MMOL/L — SIGNIFICANT CHANGE UP (ref 135–145)
SPECIMEN SOURCE: SIGNIFICANT CHANGE UP
VANCOMYCIN TROUGH SERPL-MCNC: 10.5 UG/ML — SIGNIFICANT CHANGE UP (ref 10–20)
WBC # BLD: 6.69 K/UL — SIGNIFICANT CHANGE UP (ref 3.8–10.5)
WBC # FLD AUTO: 6.69 K/UL — SIGNIFICANT CHANGE UP (ref 3.8–10.5)

## 2019-11-15 PROCEDURE — 99233 SBSQ HOSP IP/OBS HIGH 50: CPT

## 2019-11-15 PROCEDURE — 99233 SBSQ HOSP IP/OBS HIGH 50: CPT | Mod: GC

## 2019-11-15 RX ORDER — ENOXAPARIN SODIUM 100 MG/ML
40 INJECTION SUBCUTANEOUS DAILY
Refills: 0 | Status: DISCONTINUED | OUTPATIENT
Start: 2019-11-15 | End: 2019-11-20

## 2019-11-15 RX ADMIN — Medication 250 MILLIGRAM(S): at 05:37

## 2019-11-15 RX ADMIN — OXYCODONE HYDROCHLORIDE 5 MILLIGRAM(S): 5 TABLET ORAL at 05:13

## 2019-11-15 RX ADMIN — Medication 250 MILLIGRAM(S): at 21:22

## 2019-11-15 RX ADMIN — Medication 100 MILLIGRAM(S): at 05:31

## 2019-11-15 RX ADMIN — OXYCODONE HYDROCHLORIDE 5 MILLIGRAM(S): 5 TABLET ORAL at 04:13

## 2019-11-15 RX ADMIN — Medication 81 MILLIGRAM(S): at 12:27

## 2019-11-15 RX ADMIN — PIPERACILLIN AND TAZOBACTAM 25 GRAM(S): 4; .5 INJECTION, POWDER, LYOPHILIZED, FOR SOLUTION INTRAVENOUS at 23:09

## 2019-11-15 RX ADMIN — PIPERACILLIN AND TAZOBACTAM 25 GRAM(S): 4; .5 INJECTION, POWDER, LYOPHILIZED, FOR SOLUTION INTRAVENOUS at 05:31

## 2019-11-15 RX ADMIN — PIPERACILLIN AND TAZOBACTAM 25 GRAM(S): 4; .5 INJECTION, POWDER, LYOPHILIZED, FOR SOLUTION INTRAVENOUS at 13:20

## 2019-11-15 NOTE — PROGRESS NOTE ADULT - SUBJECTIVE AND OBJECTIVE BOX
Follow Up:  diabetic foot infection and osteo    Interval History: pt stable and afebrile, going to OR today    ROS:      All other systems negative    Constitutional: no fever, no chills  Head: no trauma  Eyes: no vision changes, no eye pain  ENT:  no sore throat, no rhinorrhea  Cardiovascular:  no chest pain, no palpitation  Respiratory:  no SOB, no cough  GI:  no abd pain, no vomiting, no diarrhea  urinary: no dysuria, no hematuria, no flank pain  musculoskeletal:  R foot pain after surgery  skin:  no rash  neurology:  no headache, no seizure  psych: no anxiety, no depression       Allergies  No Known Allergies        ANTIMICROBIALS:  piperacillin/tazobactam IVPB.. 3.375 every 8 hours  vancomycin  IVPB 1000 every 12 hours      OTHER MEDS:  acetaminophen   Tablet .. 650 milliGRAM(s) Oral every 6 hours PRN  aspirin enteric coated 81 milliGRAM(s) Oral daily  dextrose 40% Gel 15 Gram(s) Oral once PRN  dextrose 5%. 1000 milliLiter(s) IV Continuous <Continuous>  dextrose 50% Injectable 12.5 Gram(s) IV Push once  dextrose 50% Injectable 25 Gram(s) IV Push once  glucagon  Injectable 1 milliGRAM(s) IntraMuscular once PRN  insulin lispro (HumaLOG) corrective regimen sliding scale   SubCutaneous three times a day before meals  insulin lispro (HumaLOG) corrective regimen sliding scale   SubCutaneous at bedtime  metoprolol succinate  milliGRAM(s) Oral daily  oxyCODONE    IR 5 milliGRAM(s) Oral every 4 hours PRN  sodium chloride 0.9%. 1000 milliLiter(s) IV Continuous <Continuous>      Vital Signs Last 24 Hrs  T(C): 36.7 (15 Nov 2019 04:42), Max: 36.7 (14 Nov 2019 14:43)  T(F): 98 (15 Nov 2019 04:42), Max: 98 (14 Nov 2019 14:43)  HR: 61 (15 Nov 2019 04:42) (60 - 71)  BP: 125/71 (15 Nov 2019 04:42) (119/58 - 176/82)  BP(mean): 105 (14 Nov 2019 18:45) (83 - 105)  RR: 18 (15 Nov 2019 04:42) (14 - 18)  SpO2: 97% (15 Nov 2019 04:42) (97% - 100%)    Physical Exam:  General:    NAD, non toxic  Head: atraumatic, normocephalic  Eyes: normal sclera and conjunctiva  ENT:   no oropharyngeal lesions, no LAD, neck supple  Cardio:   sternotomy scar, regular S1,S2, no murmur  Respiratory:   clear b/l, no wheezing  abd:   soft, BS +, not tender, no hepatosplenomegaly  :     no CVAT, no suprapubic tenderness, no eduardo  Musculoskeletal : s/p L 2nd partial toe amp, now s/p R 2n ray resection and 3rd MTPJ with dressing  Skin:    no rash  vascular: no phlebitis, normal pulses  Neurologic:     no focal deficits  psych: normal affect                        12.2   6.69  )-----------( 167      ( 15 Nov 2019 07:22 )             37.0       11-15    137  |  101  |  12  ----------------------------<  115<H>  3.8   |  30  |  0.93    Ca    9.0      15 Nov 2019 07:12  Phos  3.3     11-15  Mg     2.2     11-15            MICROBIOLOGY:  v  .Tissue Other  11-15-19 --  --    Rare polymorphonuclear leukocytes seen per low power field  No organisms seen per oil power field      .Tissue Other  11-15-19 --  --    No polymorphonuclear leukocytes seen per low power field  No organisms seen per oil power field      .Tissue Other  11-15-19 --  --    No polymorphonuclear leukocytes seen per low power field  No organisms seen per oil power field      .Blood Blood-Peripheral  11-12-19   No growth to date.  --  --      .Urine Clean Catch (Midstream)  11-12-19   No growth  --  --                RADIOLOGY:  Images below reviewed personally  < from: Xray Foot AP + Lateral + Oblique, Right (11.14.19 @ 17:59) >  IMPRESSION:     Status post amputation of the second ray to the level of the metatarsal   neck. Status post amputation of the distal aspect of the third metatarsal   and possibly of a portion of the base of the third proximal phalanx,   however, evaluation is limited by overlying antibiotic radiodense   material. No other interval change.

## 2019-11-15 NOTE — PROGRESS NOTE ADULT - PROBLEM SELECTOR PLAN 2
mild cellulitis to R midfoot with osteo, cellulitis improving  - c/w vanc 1 gram q12, monitor vanc trough, 9.3  - per ID, c/w zosyn 3.375gram q8  -discussed with ID and podiatry, ok to get PICC line, discussed r/b/a of PICC line with pt/wife who wish to proceed  -pain control with tylenol and oxy prn mild cellulitis to R midfoot with osteo, cellulitis improved  -plan as above

## 2019-11-15 NOTE — PROGRESS NOTE ADULT - PROBLEM SELECTOR PLAN 1
outpatient MRI with signs of 2nd R toe osteo, +ESR/CRP   - pt now s/p resection of right 2nd metatarsal and 3rd metatarsophalangeal joint, cultures sent.   -antibiotic as below  --f/u OR cultures  -afebrile, no leukocytosis, HD stable outpatient MRI with signs of 2nd R toe osteo, +ESR/CRP   - pt now s/p resection of right 2nd metatarsal and 3rd metatarsophalangeal joint, cultures sent, high concern for residual infection  - c/w vanc 1 gram q12, monitor vanc trough, 9.3 last  - c/w zosyn 3.375gram q8  --discussed with ID and podiatry f/u OR cultures to dictate home IV antibiotic, can place PICC line now (r/b/a all discussed, pt agreeable)  -afebrile, no leukocytosis, HD stable  --pain control with tylenol and oxy prn

## 2019-11-15 NOTE — PROGRESS NOTE ADULT - PROBLEM SELECTOR PLAN 5
bp stable  -c/w home metoprolol succinate 100 qd CAD s/p remote CABG, no stents after  -c/w home ASA, toprol, statin

## 2019-11-15 NOTE — PROGRESS NOTE ADULT - PROBLEM SELECTOR PLAN 8
Transitions of Care Status:  1.  Name of PCP:  2.  PCP Contacted on Admission: [ ] Y    [ ] N    3.  PCP contacted at Discharge: [ ] Y    [ ] N    [ ] N/A  4.  Post-Discharge Appointment Date and Location:  5.  Summary of Handoff given to PCP:

## 2019-11-15 NOTE — PROGRESS NOTE ADULT - PROBLEM SELECTOR PLAN 6
CAD s/p remote CABG, no stents after  -c/w home ASA, toprol, statin  -no cp, sob DVT: asking podiatry if can restart lovenox ppx dose

## 2019-11-15 NOTE — PROGRESS NOTE ADULT - PROBLEM SELECTOR PLAN 4
-pt taking metformin 500 qid at home, hold for now  -monitor FS qAC and qHS   -c/w NAY bp stable  -c/w home metoprolol succinate 100 qd

## 2019-11-15 NOTE — PROGRESS NOTE ADULT - PROBLEM SELECTOR PROBLEM 3
Preoperative clearance Type 2 diabetes mellitus with other circulatory complication, without long-term current use of insulin

## 2019-11-15 NOTE — PROGRESS NOTE ADULT - ASSESSMENT
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

## 2019-11-15 NOTE — PROGRESS NOTE ADULT - ASSESSMENT
75 yo M w/ RF 2nd toe osteomyelitis and cellulitis to midfoot   - Pt seen and evaluated   - XR reviewed; outpt MRI placed in chart - OM of 2nd and 3rd MPJs  - now, s/p right foot partial 2nd ray resection, 3rd MPJ arthroplasty closed over antibiotic beads (DOS 11/14/19)  - POD #1- sutures intact w/ no dehiscence, no hematoma, no drainage, stable w/ no acute signs of infection   - pt will need long term antibiotics, likely w/ PICC  - will await OR path results prior to discharge   - seen w attending

## 2019-11-15 NOTE — PROGRESS NOTE ADULT - ASSESSMENT
74 m with DM, HTN, CAD s/p CABG, previous L 2nd toe amp, developed R toe swelling and pain 9/2019 s/p partial amputation but never improved and for the last week had worsening edema and pain, an outside xray was s/o osteo, he was started on clinda and when the MRI showed osteo and pt did not improve, came to ER. He denied fever, chills, diarrhea   here afebrile, WBC normal, ESR: 28, CRP: 0.62  xray: Bony destruction and lucency at the base of the proximal phalanges of the second and third digits as well as the distal aspects of the second and third metatarsals consistent with osteomyelitis.    diabetic foot infection, R foot cellulitis and osteomyelitis (had partial 2nd toe amp 9/2019 with worsening R foot edema, erythema and pain)  blood cx negative    * c/w vanco 1 q 12 and check the trough tomorrow  * c/w zosyn 3.375 q 8 for now, will adjust as per the cultures  * f/u the OR cultures  * high suspicion for residual infection, can place a picc line  * discharge regimen based on cultures

## 2019-11-15 NOTE — PROGRESS NOTE ADULT - PROBLEM SELECTOR PROBLEM 4
Type 2 diabetes mellitus with other circulatory complication, without long-term current use of insulin Essential hypertension

## 2019-11-15 NOTE — PROGRESS NOTE ADULT - PROBLEM SELECTOR PLAN 7
DVT: Lovenox to be held today prior to going to OR  Diet: NPO for OR today Speaking Coherently Dispo: likely home pending OR cultures with PICC line  Transitions of Care Status:  1.  Name of PCP:  2.  PCP Contacted on Admission: [ ] Y    [ ] N    3.  PCP contacted at Discharge: [ ] Y    [ ] N    [ ] N/A  4.  Post-Discharge Appointment Date and Location:  5.  Summary of Handoff given to PCP:

## 2019-11-15 NOTE — PROGRESS NOTE ADULT - SUBJECTIVE AND OBJECTIVE BOX
Patient is a 74y old  Male who presents with a chief complaint of Persistent RIGHT forefoot pain, swelling for the past 6 days but worsening since 9/28/19 (14 Nov 2019 18:01)       INTERVAL HPI/OVERNIGHT EVENTS: No acute events overnight.    SUBJECTIVE: Pt seen and examined this morning.    MEDICATIONS  (STANDING):  aspirin enteric coated 81 milliGRAM(s) Oral daily  dextrose 5%. 1000 milliLiter(s) (50 mL/Hr) IV Continuous <Continuous>  dextrose 50% Injectable 12.5 Gram(s) IV Push once  dextrose 50% Injectable 25 Gram(s) IV Push once  insulin lispro (HumaLOG) corrective regimen sliding scale   SubCutaneous three times a day before meals  insulin lispro (HumaLOG) corrective regimen sliding scale   SubCutaneous at bedtime  metoprolol succinate  milliGRAM(s) Oral daily  piperacillin/tazobactam IVPB.. 3.375 Gram(s) IV Intermittent every 8 hours  sodium chloride 0.9%. 1000 milliLiter(s) (75 mL/Hr) IV Continuous <Continuous>  vancomycin  IVPB 1000 milliGRAM(s) IV Intermittent every 12 hours    MEDICATIONS  (PRN):  acetaminophen   Tablet .. 650 milliGRAM(s) Oral every 6 hours PRN Mild Pain (1 - 3)  dextrose 40% Gel 15 Gram(s) Oral once PRN Blood Glucose LESS THAN 70 milliGRAM(s)/deciliter  glucagon  Injectable 1 milliGRAM(s) IntraMuscular once PRN Glucose LESS THAN 70 milligrams/deciliter  oxyCODONE    IR 5 milliGRAM(s) Oral every 4 hours PRN Moderate Pain (4 - 6)      Allergies    No Known Allergies    Intolerances        REVIEW OF SYSTEMS:  CONSTITUTIONAL: No fever, weight loss, or fatigue  EYES: No eye pain, visual disturbances, or discharge  ENMT:  No difficulty hearing, tinnitus, vertigo; No sinus or throat pain  RESPIRATORY: No cough, wheezing, chills or hemoptysis; No shortness of breath  CARDIOVASCULAR: No chest pain, palpitations, dizziness, or leg swelling  GASTROINTESTINAL: No abdominal or epigastric pain. No nausea, vomiting, or hematemesis; No diarrhea or constipation. No melena or hematochezia.  GENITOURINARY: No dysuria, frequency, hematuria, or incontinence  NEUROLOGICAL: No headaches, loss of strength, numbness, or tremors  SKIN: No itching, burning, rashes, or lesions   LYMPH NODES: No enlarged glands  ENDOCRINE: No heat or cold intolerance; No polydipsia or polyuria  MUSCULOSKELETAL: No joint pain or swelling;   PSYCHIATRIC: Denies depression, anxiety  HEME/LYMPH: No easy bruising, or bleeding gums  ALLERGY AND IMMUNOLOGIC: No hives or eczema    Vital Signs Last 24 Hrs  T(C): 36.7 (15 Nov 2019 04:42), Max: 36.7 (14 Nov 2019 14:43)  T(F): 98 (15 Nov 2019 04:42), Max: 98 (14 Nov 2019 14:43)  HR: 61 (15 Nov 2019 04:42) (60 - 71)  BP: 125/71 (15 Nov 2019 04:42) (119/58 - 176/82)  BP(mean): 105 (14 Nov 2019 18:45) (83 - 105)  RR: 18 (15 Nov 2019 04:42) (14 - 18)  SpO2: 97% (15 Nov 2019 04:42) (97% - 100%)    PHYSICAL EXAM:  GENERAL: NAD, well-developed  HEENT: NC/AT,  NECK: Supple, No JVD, no carotid bruits b/l  CHEST/LUNG: Clear to auscultation bilaterally; No rales, rhonchi, wheezing, or rubs  CARD: Regular rate and rhythm; No murmurs, rubs, or gallops. No LE edema  ABDOMEN: Soft, Nontender, Nondistended; Bowel sounds present  EXTREMITIES:  2+ Peripheral Pulses, 2nd toes on both feet resected. Right foot in dressing after podiatric procedure.  NEURO: AOx3, moving all extremities    LABS:                        12.2   6.69  )-----------( 167      ( 15 Nov 2019 07:22 )             37.0       Ca    8.9        14 Nov 2019 05:11      PT/INR - ( 14 Nov 2019 05:11 )   PT: 12.3 sec;   INR: 1.08 ratio         PTT - ( 14 Nov 2019 05:11 )  PTT:31.2 sec  CAPILLARY BLOOD GLUCOSE      POCT Blood Glucose.: 191 mg/dL (14 Nov 2019 21:25)  POCT Blood Glucose.: 94 mg/dL (14 Nov 2019 18:53)  POCT Blood Glucose.: 124 mg/dL (14 Nov 2019 12:06)    BLOOD CULTURE  11-12 @ 23:11   No growth to date.  --  --  11-12 @ 22:06   No growth  --  --    RADIOLOGY & ADDITIONAL TESTS:  Foot XR 11/12:  Bony destruction and lucency at the base of the proximal phalanges of the second and third digits as well as the distal aspects of the second and third metatarsals consistent with osteomyelitis.    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    Vascular calcifications.    Imaging Personally Reviewed:  [ ] YES     Consultant(s) Notes Reviewed:      Care Discussed with Consultants/Other Providers: Patient is a 74y old  Male who presents with a chief complaint of Persistent RIGHT forefoot pain, swelling for the past 6 days but worsening since 9/28/19 (14 Nov 2019 18:01)       INTERVAL HPI/OVERNIGHT EVENTS: No acute events overnight.    SUBJECTIVE: Pt seen and examined this morning. No pain in LE, denies f/c, cp, sob    MEDICATIONS  (STANDING):  aspirin enteric coated 81 milliGRAM(s) Oral daily  dextrose 5%. 1000 milliLiter(s) (50 mL/Hr) IV Continuous <Continuous>  dextrose 50% Injectable 12.5 Gram(s) IV Push once  dextrose 50% Injectable 25 Gram(s) IV Push once  insulin lispro (HumaLOG) corrective regimen sliding scale   SubCutaneous three times a day before meals  insulin lispro (HumaLOG) corrective regimen sliding scale   SubCutaneous at bedtime  metoprolol succinate  milliGRAM(s) Oral daily  piperacillin/tazobactam IVPB.. 3.375 Gram(s) IV Intermittent every 8 hours  sodium chloride 0.9%. 1000 milliLiter(s) (75 mL/Hr) IV Continuous <Continuous>  vancomycin  IVPB 1000 milliGRAM(s) IV Intermittent every 12 hours    MEDICATIONS  (PRN):  acetaminophen   Tablet .. 650 milliGRAM(s) Oral every 6 hours PRN Mild Pain (1 - 3)  dextrose 40% Gel 15 Gram(s) Oral once PRN Blood Glucose LESS THAN 70 milliGRAM(s)/deciliter  glucagon  Injectable 1 milliGRAM(s) IntraMuscular once PRN Glucose LESS THAN 70 milligrams/deciliter  oxyCODONE    IR 5 milliGRAM(s) Oral every 4 hours PRN Moderate Pain (4 - 6)      Allergies    No Known Allergies    Intolerances            Vital Signs Last 24 Hrs  T(C): 36.7 (15 Nov 2019 04:42), Max: 36.7 (14 Nov 2019 14:43)  T(F): 98 (15 Nov 2019 04:42), Max: 98 (14 Nov 2019 14:43)  HR: 61 (15 Nov 2019 04:42) (60 - 71)  BP: 125/71 (15 Nov 2019 04:42) (119/58 - 176/82)  BP(mean): 105 (14 Nov 2019 18:45) (83 - 105)  RR: 18 (15 Nov 2019 04:42) (14 - 18)  SpO2: 97% (15 Nov 2019 04:42) (97% - 100%)    PHYSICAL EXAM:  GENERAL: NAD, well-developed  HEENT: NC/AT,  NECK: Supple, No JVD, no carotid bruits b/l  CHEST/LUNG: Clear to auscultation bilaterally; No rales, rhonchi, wheezing, or rubs  CARD: Regular rate and rhythm; No murmurs, rubs, or gallops. No LE edema  ABDOMEN: Soft, Nontender, Nondistended; Bowel sounds present  EXTREMITIES:  2+ Peripheral Pulses, Right foot in dressing after podiatric procedure.  NEURO: AOx3, moving all extremities    LABS:                        12.2   6.69  )-----------( 167      ( 15 Nov 2019 07:22 )             37.0       Ca    8.9        14 Nov 2019 05:11      PT/INR - ( 14 Nov 2019 05:11 )   PT: 12.3 sec;   INR: 1.08 ratio         PTT - ( 14 Nov 2019 05:11 )  PTT:31.2 sec  CAPILLARY BLOOD GLUCOSE      POCT Blood Glucose.: 191 mg/dL (14 Nov 2019 21:25)  POCT Blood Glucose.: 94 mg/dL (14 Nov 2019 18:53)  POCT Blood Glucose.: 124 mg/dL (14 Nov 2019 12:06)    BLOOD CULTURE  11-12 @ 23:11   No growth to date.  --  --  11-12 @ 22:06   No growth  --  --    RADIOLOGY & ADDITIONAL TESTS:  Foot XR 11/12:  Bony destruction and lucency at the base of the proximal phalanges of the second and third digits as well as the distal aspects of the second and third metatarsals consistent with osteomyelitis.    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    Vascular calcifications.    Imaging Personally Reviewed:  [ ] YES     Consultant(s) Notes Reviewed:      Care Discussed with Consultants/Other Providers:

## 2019-11-15 NOTE — PROGRESS NOTE ADULT - PROBLEM SELECTOR PLAN 3
RCRI score of 1 due to CAD w/ CABG Which represents at least 6% risk of death, MI or cardiac arrest within 30 days. Patient is moderate risk for a low to moderate risk procedure.   greats METs >10, works out 3 times a weeks, no limitation to functional status denies CP, SOB,    -Recent EKG performed on 11/12 showed NSR with right bundle branch block which is chronic. Also reports have "normal echo" a few months with his cardiologist Dr Allen. CXR clear. Furthermore, patient with recent podiatry surgery in September with no previous surgical or anesthetic complications.  -currently medically optimized for podiatric surgery of right foot. -pt taking metformin 500 qid at home, hold for now  -monitor FS qAC and qHS, FS stable  -c/w NAY

## 2019-11-15 NOTE — PROGRESS NOTE ADULT - PROBLEM SELECTOR PROBLEM 6
Coronary artery disease involving coronary bypass graft of native heart without angina pectoris Need for prophylactic measure

## 2019-11-15 NOTE — PROGRESS NOTE ADULT - SUBJECTIVE AND OBJECTIVE BOX
Patient is a 74y old  Male who presents with a chief complaint of Persistent RIGHT forefoot pain, swelling for the past 6 days but worsening since 9/28/19 (15 Nov 2019 07:58)       INTERVAL HPI/OVERNIGHT EVENTS:  Patient seen and evaluated at bedside.  Pt is resting comfortable in NAD. Denies N/V/F/C.     Allergies    No Known Allergies    Intolerances        Vital Signs Last 24 Hrs  T(C): 36.7 (15 Nov 2019 04:42), Max: 36.7 (14 Nov 2019 14:43)  T(F): 98 (15 Nov 2019 04:42), Max: 98 (14 Nov 2019 14:43)  HR: 61 (15 Nov 2019 04:42) (60 - 71)  BP: 125/71 (15 Nov 2019 04:42) (119/58 - 176/82)  BP(mean): 105 (14 Nov 2019 18:45) (83 - 105)  RR: 18 (15 Nov 2019 04:42) (14 - 18)  SpO2: 97% (15 Nov 2019 04:42) (97% - 100%)    LABS:                        12.2   6.69  )-----------( 167      ( 15 Nov 2019 07:22 )             37.0     11-15    137  |  101  |  12  ----------------------------<  115<H>  3.8   |  30  |  0.93    Ca    9.0      15 Nov 2019 07:12  Phos  3.3     11-15  Mg     2.2     11-15      PT/INR - ( 14 Nov 2019 05:11 )   PT: 12.3 sec;   INR: 1.08 ratio         PTT - ( 14 Nov 2019 05:11 )  PTT:31.2 sec    CAPILLARY BLOOD GLUCOSE      POCT Blood Glucose.: 127 mg/dL (15 Nov 2019 08:27)  POCT Blood Glucose.: 191 mg/dL (14 Nov 2019 21:25)  POCT Blood Glucose.: 94 mg/dL (14 Nov 2019 18:53)  POCT Blood Glucose.: 124 mg/dL (14 Nov 2019 12:06)      Lower Extremity Physical Exam:  Vascular: DP/PT 1/4, B/L, CFT <3seconds B/L, Temperature gradient warm to cool, B/L.   Neuro: Epicritic sensation diminished to the level of digits, B/L.  Musculoskeletal/Ortho: s/p partial 2nd toe amp b/l  Skin: erythema and edema of R fore foot, RF partal 2nd toe amp site with pin point opening latearl side, serous drainage and crusts noted.    - now, s/p right foot partial 2nd ray resection, 3rd MPJ arthroplasty closed over antibiotic beads (DOS 11/14/19)  - POD #1- sutures intact w/ no dehiscence, no hematoma, no drainage, stable w/ no acute signs of infection

## 2019-11-16 LAB
ANION GAP SERPL CALC-SCNC: 11 MMOL/L — SIGNIFICANT CHANGE UP (ref 5–17)
BUN SERPL-MCNC: 14 MG/DL — SIGNIFICANT CHANGE UP (ref 7–23)
CALCIUM SERPL-MCNC: 9 MG/DL — SIGNIFICANT CHANGE UP (ref 8.4–10.5)
CHLORIDE SERPL-SCNC: 102 MMOL/L — SIGNIFICANT CHANGE UP (ref 96–108)
CO2 SERPL-SCNC: 27 MMOL/L — SIGNIFICANT CHANGE UP (ref 22–31)
CREAT SERPL-MCNC: 0.95 MG/DL — SIGNIFICANT CHANGE UP (ref 0.5–1.3)
GLUCOSE SERPL-MCNC: 128 MG/DL — HIGH (ref 70–99)
HCT VFR BLD CALC: 36.9 % — LOW (ref 39–50)
HGB BLD-MCNC: 12.4 G/DL — LOW (ref 13–17)
MAGNESIUM SERPL-MCNC: 2.2 MG/DL — SIGNIFICANT CHANGE UP (ref 1.6–2.6)
MCHC RBC-ENTMCNC: 30.2 PG — SIGNIFICANT CHANGE UP (ref 27–34)
MCHC RBC-ENTMCNC: 33.6 GM/DL — SIGNIFICANT CHANGE UP (ref 32–36)
MCV RBC AUTO: 89.8 FL — SIGNIFICANT CHANGE UP (ref 80–100)
NRBC # BLD: 0 /100 WBCS — SIGNIFICANT CHANGE UP (ref 0–0)
PHOSPHATE SERPL-MCNC: 2.8 MG/DL — SIGNIFICANT CHANGE UP (ref 2.5–4.5)
PLATELET # BLD AUTO: 154 K/UL — SIGNIFICANT CHANGE UP (ref 150–400)
POTASSIUM SERPL-MCNC: 4 MMOL/L — SIGNIFICANT CHANGE UP (ref 3.5–5.3)
POTASSIUM SERPL-SCNC: 4 MMOL/L — SIGNIFICANT CHANGE UP (ref 3.5–5.3)
RBC # BLD: 4.11 M/UL — LOW (ref 4.2–5.8)
RBC # FLD: 12.8 % — SIGNIFICANT CHANGE UP (ref 10.3–14.5)
SODIUM SERPL-SCNC: 140 MMOL/L — SIGNIFICANT CHANGE UP (ref 135–145)
WBC # BLD: 6.5 K/UL — SIGNIFICANT CHANGE UP (ref 3.8–10.5)
WBC # FLD AUTO: 6.5 K/UL — SIGNIFICANT CHANGE UP (ref 3.8–10.5)

## 2019-11-16 PROCEDURE — 99233 SBSQ HOSP IP/OBS HIGH 50: CPT | Mod: GC

## 2019-11-16 PROCEDURE — 71045 X-RAY EXAM CHEST 1 VIEW: CPT | Mod: 26

## 2019-11-16 RX ORDER — CHLORHEXIDINE GLUCONATE 213 G/1000ML
1 SOLUTION TOPICAL
Refills: 0 | Status: DISCONTINUED | OUTPATIENT
Start: 2019-11-16 | End: 2019-11-20

## 2019-11-16 RX ORDER — SODIUM CHLORIDE 9 MG/ML
10 INJECTION INTRAMUSCULAR; INTRAVENOUS; SUBCUTANEOUS
Refills: 0 | Status: DISCONTINUED | OUTPATIENT
Start: 2019-11-16 | End: 2019-11-20

## 2019-11-16 RX ADMIN — OXYCODONE HYDROCHLORIDE 5 MILLIGRAM(S): 5 TABLET ORAL at 21:40

## 2019-11-16 RX ADMIN — Medication 250 MILLIGRAM(S): at 21:03

## 2019-11-16 RX ADMIN — OXYCODONE HYDROCHLORIDE 5 MILLIGRAM(S): 5 TABLET ORAL at 14:58

## 2019-11-16 RX ADMIN — PIPERACILLIN AND TAZOBACTAM 25 GRAM(S): 4; .5 INJECTION, POWDER, LYOPHILIZED, FOR SOLUTION INTRAVENOUS at 14:52

## 2019-11-16 RX ADMIN — ENOXAPARIN SODIUM 40 MILLIGRAM(S): 100 INJECTION SUBCUTANEOUS at 12:29

## 2019-11-16 RX ADMIN — OXYCODONE HYDROCHLORIDE 5 MILLIGRAM(S): 5 TABLET ORAL at 10:53

## 2019-11-16 RX ADMIN — Medication 81 MILLIGRAM(S): at 12:29

## 2019-11-16 RX ADMIN — OXYCODONE HYDROCHLORIDE 5 MILLIGRAM(S): 5 TABLET ORAL at 21:03

## 2019-11-16 RX ADMIN — Medication 100 MILLIGRAM(S): at 06:07

## 2019-11-16 RX ADMIN — PIPERACILLIN AND TAZOBACTAM 25 GRAM(S): 4; .5 INJECTION, POWDER, LYOPHILIZED, FOR SOLUTION INTRAVENOUS at 21:03

## 2019-11-16 RX ADMIN — Medication 250 MILLIGRAM(S): at 10:46

## 2019-11-16 RX ADMIN — Medication 1: at 12:32

## 2019-11-16 RX ADMIN — PIPERACILLIN AND TAZOBACTAM 25 GRAM(S): 4; .5 INJECTION, POWDER, LYOPHILIZED, FOR SOLUTION INTRAVENOUS at 06:07

## 2019-11-16 RX ADMIN — OXYCODONE HYDROCHLORIDE 5 MILLIGRAM(S): 5 TABLET ORAL at 11:23

## 2019-11-16 RX ADMIN — OXYCODONE HYDROCHLORIDE 5 MILLIGRAM(S): 5 TABLET ORAL at 15:28

## 2019-11-16 NOTE — PROGRESS NOTE ADULT - SUBJECTIVE AND OBJECTIVE BOX
Podiatry pager #: [phone] ([place])/ [phone] ([facility])    Patient is a 74y old  Male who presents with a chief complaint of Persistent RIGHT forefoot pain, swelling for the past 6 days but worsening since 9/28/19 (16 Nov 2019 06:26)       INTERVAL HPI/OVERNIGHT EVENTS:  Patient seen and evaluated at bedside.  Pt is resting comfortable in NAD. Denies N/V/F/C.     Allergies    No Known Allergies    Intolerances        Vital Signs Last 24 Hrs  T(C): 36.8 (16 Nov 2019 05:03), Max: 37.1 (15 Nov 2019 21:35)  T(F): 98.3 (16 Nov 2019 05:03), Max: 98.8 (15 Nov 2019 21:35)  HR: 71 (16 Nov 2019 05:03) (65 - 73)  BP: 135/71 (16 Nov 2019 05:03) (135/71 - 142/75)  BP(mean): --  RR: 18 (16 Nov 2019 05:03) (18 - 18)  SpO2: 99% (16 Nov 2019 05:03) (96% - 99%)    LABS:                        12.4   6.50  )-----------( 154      ( 16 Nov 2019 06:54 )             36.9     11-16    140  |  102  |  14  ----------------------------<  128<H>  4.0   |  27  |  0.95    Ca    9.0      16 Nov 2019 06:54  Phos  2.8     11-16  Mg     2.2     11-16          CAPILLARY BLOOD GLUCOSE      POCT Blood Glucose.: 173 mg/dL (16 Nov 2019 12:00)  POCT Blood Glucose.: 133 mg/dL (16 Nov 2019 08:27)  POCT Blood Glucose.: 107 mg/dL (15 Nov 2019 21:28)  POCT Blood Glucose.: 141 mg/dL (15 Nov 2019 17:51)  POCT Blood Glucose.: 116 mg/dL (15 Nov 2019 13:48)      Lower Extremity Physical Exam:  Vascular: DP/PT 1/4, B/L, CFT <3 seconds B/L, Temperature gradient warm to cool, B/L.   Neuro: Epicritic sensation diminished to the level of digits, B/L.  Musculoskeletal/Ortho: s/p partial 2nd toe amp b/l    s/p right foot partial 2nd ray resection, 3rd MPJ arthroplasty closed over antibiotic beads (DOS 11/14/19)  Sutures intact w/ no dehiscence, flap viable, no hematoma and no active bleeding however 3cc sanguinous drianage was expressed w/ compression. Site is stable w/ no signs of infection.

## 2019-11-16 NOTE — PROGRESS NOTE ADULT - PROBLEM SELECTOR PLAN 6
DVT: per podiatry ok to restart Lovenox  Diet: CC DVT: per podiatry ok to restart Lovenox  Diet: CC  Dispo: likely home pending OR cultures with PICC line DVT: per podiatry ok to restart Lovenox  Diet: CC  Dispo: likely home pending OR cultures DVT: c/w Lovenox 40mg daily  Diet: CC  Dispo: likely home early this week pending OR cultures with IV antibiotics

## 2019-11-16 NOTE — PROGRESS NOTE ADULT - ASSESSMENT
75 yo M s/p right foot partial 2nd ray resection (DOS 11/14/19)  -Pt seen and evaluated, POD #2  -Sutures intact w/ no dehiscence, flap viable, no hematoma and no active bleeding however 3cc sanguinous drianage was expressed w/ compression. Site is stable w/ no signs of infection.  -High concern for residual infection  -Will need long term IV abx via PICC   -Prelim OR cultures are negative however pt has been on oral antibiotics for 2-3 weeks, which may be reflected in intra-op culture data  -Will follow up on final OR data and ID recs  -Discussed w/ attending

## 2019-11-16 NOTE — PROGRESS NOTE ADULT - PROBLEM SELECTOR PLAN 2
mild cellulitis to R midfoot with osteo, cellulitis improved  -plan as above cellulitis of R midfoot resolved  -antibiotics as above

## 2019-11-16 NOTE — PROGRESS NOTE ADULT - PROBLEM SELECTOR PLAN 7
Dispo: likely home pending OR cultures with PICC line  Transitions of Care Status:  1.  Name of PCP:  2.  PCP Contacted on Admission: [ ] Y    [ ] N    3.  PCP contacted at Discharge: [ ] Y    [ ] N    [ ] N/A  4.  Post-Discharge Appointment Date and Location:  5.  Summary of Handoff given to PCP: Transitions of Care Status:  1.  Name of PCP:  2.  PCP Contacted on Admission: [ ] Y    [ ] N    3.  PCP contacted at Discharge: [ ] Y    [ ] N    [ ] N/A  4.  Post-Discharge Appointment Date and Location:  5.  Summary of Handoff given to PCP:

## 2019-11-16 NOTE — PROGRESS NOTE ADULT - PROBLEM SELECTOR PLAN 1
outpatient MRI with signs of 2nd R toe osteo, +ESR/CRP   - pt now s/p resection of right 2nd metatarsal and 3rd metatarsophalangeal joint, cultures sent, high concern for residual infection  - c/w vanc 1 gram q12, monitor vanc trough, 9.3 last  - c/w zosyn 3.375gram q8  --discussed with ID and podiatry f/u OR cultures to dictate home IV antibiotic, can place PICC line now (r/b/a all discussed, pt agreeable)  -afebrile, no leukocytosis, HD stable  -pain control with tylenol and oxy prn outpatient MRI with signs of 2nd R toe osteo, +ESR/CRP   - pt now s/p resection of right 2nd metatarsal and 3rd metatarsophalangeal joint, cultures sent, so far no growth, pending final results  - c/w vanc 1 gram q12, monitor vanc trough, 9.3 last  - c/w zosyn 3.375gram q8  --discussed with ID and podiatry f/u OR cultures to dictate home IV antibiotic, can place PICC line now (r/b/a all discussed, pt agreeable)  -afebrile, no leukocytosis, HD stable  -pain control with tylenol and oxy prn outpatient MRI with signs of 2nd R toe osteo, +ESR/CRP   - s/p resection of right 2nd metatarsal and 3rd metatarsophalangeal joint, cultures sent, NGTD,  pending final results but high concern for residual infection intraop  - c/w vanc 1 gram q12, monitor vanc trough, 9.3 last  - c/w zosyn 3.375gram q8  -s/p PICC line today, f/u CXR  -per ID and podiatry f/u OR cultures to dictate home IV antibiotics  -afebrile, no leukocytosis, HD stable  -pain control with tylenol and oxy prn

## 2019-11-16 NOTE — PROGRESS NOTE ADULT - SUBJECTIVE AND OBJECTIVE BOX
Patient is a 74y old  Male who presents with a chief complaint of Persistent RIGHT forefoot pain, swelling for the past 6 days but worsening since 9/28/19 (15 Nov 2019 12:15)     INTERVAL HPI/OVERNIGHT EVENTS: No acute events overnight.    SUBJECTIVE: Pt seen and examined this morning.    MEDICATIONS  (STANDING):  aspirin enteric coated 81 milliGRAM(s) Oral daily  dextrose 5%. 1000 milliLiter(s) (50 mL/Hr) IV Continuous <Continuous>  dextrose 50% Injectable 12.5 Gram(s) IV Push once  dextrose 50% Injectable 25 Gram(s) IV Push once  enoxaparin Injectable 40 milliGRAM(s) SubCutaneous daily  insulin lispro (HumaLOG) corrective regimen sliding scale   SubCutaneous three times a day before meals  insulin lispro (HumaLOG) corrective regimen sliding scale   SubCutaneous at bedtime  metoprolol succinate  milliGRAM(s) Oral daily  piperacillin/tazobactam IVPB.. 3.375 Gram(s) IV Intermittent every 8 hours  sodium chloride 0.9%. 1000 milliLiter(s) (75 mL/Hr) IV Continuous <Continuous>  vancomycin  IVPB 1000 milliGRAM(s) IV Intermittent every 12 hours    MEDICATIONS  (PRN):  acetaminophen   Tablet .. 650 milliGRAM(s) Oral every 6 hours PRN Mild Pain (1 - 3)  dextrose 40% Gel 15 Gram(s) Oral once PRN Blood Glucose LESS THAN 70 milliGRAM(s)/deciliter  glucagon  Injectable 1 milliGRAM(s) IntraMuscular once PRN Glucose LESS THAN 70 milligrams/deciliter  oxyCODONE    IR 5 milliGRAM(s) Oral every 4 hours PRN Moderate Pain (4 - 6)    Allergies    No Known Allergies    Intolerances    REVIEW OF SYSTEMS:  CONSTITUTIONAL: No fever, weight loss, or fatigue  EYES: No eye pain, visual disturbances, or discharge  ENMT:  No difficulty hearing, tinnitus, vertigo; No sinus or throat pain  RESPIRATORY: No cough, wheezing, chills or hemoptysis; No shortness of breath  CARDIOVASCULAR: No chest pain, palpitations, dizziness, or leg swelling  GASTROINTESTINAL: No abdominal or epigastric pain. No nausea, vomiting, or hematemesis; No diarrhea or constipation. No melena or hematochezia.  GENITOURINARY: No dysuria, frequency, hematuria, or incontinence  NEUROLOGICAL: No headaches, loss of strength, numbness, or tremors  SKIN: No itching, burning, rashes, or lesions   LYMPH NODES: No enlarged glands  ENDOCRINE: No heat or cold intolerance; No polydipsia or polyuria  MUSCULOSKELETAL: No joint pain or swelling;   PSYCHIATRIC: Denies depression, anxiety  HEME/LYMPH: No easy bruising, or bleeding gums  ALLERGY AND IMMUNOLOGIC: No hives or eczema    Vital Signs Last 24 Hrs  T(C): 36.8 (16 Nov 2019 05:03), Max: 37.1 (15 Nov 2019 21:35)  T(F): 98.3 (16 Nov 2019 05:03), Max: 98.8 (15 Nov 2019 21:35)  HR: 71 (16 Nov 2019 05:03) (65 - 73)  BP: 135/71 (16 Nov 2019 05:03) (135/71 - 142/75)  BP(mean): --  RR: 18 (16 Nov 2019 05:03) (18 - 18)  SpO2: 99% (16 Nov 2019 05:03) (96% - 99%)    PHYSICAL EXAM:  GENERAL: NAD, well-developed  HEENT: NC/AT,  NECK: Supple, No JVD, no carotid bruits b/l  CHEST/LUNG: Clear to auscultation bilaterally; No rales, rhonchi, wheezing, or rubs  CARD: Regular rate and rhythm; No murmurs, rubs, or gallops. No LE edema  ABDOMEN: Soft, Nontender, Nondistended; Bowel sounds present  EXTREMITIES:  2+ Peripheral Pulses, Right foot in dressing after podiatric procedure.  NEURO: AOx3, moving all extremities    LABS:                        12.2   6.69  )-----------( 167      ( 15 Nov 2019 07:22 )             37.0     15 Nov 2019 07:12    137    |  101    |  12     ----------------------------<  115    3.8     |  30     |  0.93     Ca    9.0        15 Nov 2019 07:12  Phos  3.3       15 Nov 2019 07:12  Mg     2.2       15 Nov 2019 07:12    CAPILLARY BLOOD GLUCOSE    POCT Blood Glucose.: 107 mg/dL (15 Nov 2019 21:28)  POCT Blood Glucose.: 141 mg/dL (15 Nov 2019 17:51)  POCT Blood Glucose.: 116 mg/dL (15 Nov 2019 13:48)  POCT Blood Glucose.: 127 mg/dL (15 Nov 2019 08:27)    BLOOD CULTURE  11-15 @ 02:14   No growth  --  --  11-15 @ 02:03   No growth  --  --  11-15 @ 01:25   No growth  --  --  11-12 @ 23:11   No growth to date.  --  --  11-12 @ 22:06   No growth  --  --    RADIOLOGY & ADDITIONAL TESTS:  Foot XR 11/12:  Bony destruction and lucency at the base of the proximal phalanges of the second and third digits as well as the distal aspects of the second and third metatarsals consistent with osteomyelitis.    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    Vascular calcifications.    Imaging Personally Reviewed:  [ ] YES     Consultant(s) Notes Reviewed:      Care Discussed with Consultants/Other Providers: Patient is a 74y old  Male who presents with a chief complaint of Persistent RIGHT forefoot pain, swelling for the past 6 days but worsening since 9/28/19 (15 Nov 2019 12:15)     INTERVAL HPI/OVERNIGHT EVENTS: No acute events overnight.    SUBJECTIVE: Pt seen and examined this morning. States he feels well. Denies any pain in his feet. States that his shoulder pain is improved but still present, pt attributes it to being in bed for too long. Denies any CP, SOB, fevers, n/v, abdominal pain, or diarrhea.    MEDICATIONS  (STANDING):  aspirin enteric coated 81 milliGRAM(s) Oral daily  dextrose 5%. 1000 milliLiter(s) (50 mL/Hr) IV Continuous <Continuous>  dextrose 50% Injectable 12.5 Gram(s) IV Push once  dextrose 50% Injectable 25 Gram(s) IV Push once  enoxaparin Injectable 40 milliGRAM(s) SubCutaneous daily  insulin lispro (HumaLOG) corrective regimen sliding scale   SubCutaneous three times a day before meals  insulin lispro (HumaLOG) corrective regimen sliding scale   SubCutaneous at bedtime  metoprolol succinate  milliGRAM(s) Oral daily  piperacillin/tazobactam IVPB.. 3.375 Gram(s) IV Intermittent every 8 hours  sodium chloride 0.9%. 1000 milliLiter(s) (75 mL/Hr) IV Continuous <Continuous>  vancomycin  IVPB 1000 milliGRAM(s) IV Intermittent every 12 hours    MEDICATIONS  (PRN):  acetaminophen   Tablet .. 650 milliGRAM(s) Oral every 6 hours PRN Mild Pain (1 - 3)  dextrose 40% Gel 15 Gram(s) Oral once PRN Blood Glucose LESS THAN 70 milliGRAM(s)/deciliter  glucagon  Injectable 1 milliGRAM(s) IntraMuscular once PRN Glucose LESS THAN 70 milligrams/deciliter  oxyCODONE    IR 5 milliGRAM(s) Oral every 4 hours PRN Moderate Pain (4 - 6)    Allergies    No Known Allergies    Intolerances    REVIEW OF SYSTEMS:  CONSTITUTIONAL: No fever, weight loss, or fatigue  EYES: No eye pain, visual disturbances, or discharge  ENMT:  No difficulty hearing, tinnitus, vertigo; No sinus or throat pain  RESPIRATORY: No cough, wheezing, chills or hemoptysis; No shortness of breath  CARDIOVASCULAR: No chest pain, palpitations, dizziness, or leg swelling  GASTROINTESTINAL: No abdominal or epigastric pain. No nausea, vomiting, or hematemesis; No diarrhea or constipation. No melena or hematochezia.  GENITOURINARY: No dysuria, frequency, hematuria, or incontinence  NEUROLOGICAL: No headaches, loss of strength, numbness, or tremors  SKIN: No itching, burning, rashes, or lesions   LYMPH NODES: No enlarged glands  ENDOCRINE: No heat or cold intolerance; No polydipsia or polyuria  MUSCULOSKELETAL: No joint pain or swelling;   PSYCHIATRIC: Denies depression, anxiety  HEME/LYMPH: No easy bruising, or bleeding gums  ALLERGY AND IMMUNOLOGIC: No hives or eczema    Vital Signs Last 24 Hrs  T(C): 36.8 (16 Nov 2019 05:03), Max: 37.1 (15 Nov 2019 21:35)  T(F): 98.3 (16 Nov 2019 05:03), Max: 98.8 (15 Nov 2019 21:35)  HR: 71 (16 Nov 2019 05:03) (65 - 73)  BP: 135/71 (16 Nov 2019 05:03) (135/71 - 142/75)  BP(mean): --  RR: 18 (16 Nov 2019 05:03) (18 - 18)  SpO2: 99% (16 Nov 2019 05:03) (96% - 99%)    PHYSICAL EXAM:  GENERAL: NAD, well-developed  HEENT: NC/AT,  NECK: Supple, No JVD, no carotid bruits b/l  CHEST/LUNG: Clear to auscultation bilaterally; No rales, rhonchi, wheezing, or rubs  CARD: Regular rate and rhythm; No murmurs, rubs, or gallops. No LE edema  ABDOMEN: Soft, Nontender, Nondistended; Bowel sounds present  EXTREMITIES:  2+ Peripheral Pulses, Right foot in dressing after podiatric procedure.  NEURO: AOx3, moving all extremities    LABS:                        12.2   6.69  )-----------( 167      ( 15 Nov 2019 07:22 )             37.0     15 Nov 2019 07:12    137    |  101    |  12     ----------------------------<  115    3.8     |  30     |  0.93     Ca    9.0        15 Nov 2019 07:12  Phos  3.3       15 Nov 2019 07:12  Mg     2.2       15 Nov 2019 07:12    CAPILLARY BLOOD GLUCOSE    POCT Blood Glucose.: 107 mg/dL (15 Nov 2019 21:28)  POCT Blood Glucose.: 141 mg/dL (15 Nov 2019 17:51)  POCT Blood Glucose.: 116 mg/dL (15 Nov 2019 13:48)  POCT Blood Glucose.: 127 mg/dL (15 Nov 2019 08:27)    BLOOD CULTURE  11-15 @ 02:14   No growth  --  --  11-15 @ 02:03   No growth  --  --  11-15 @ 01:25   No growth  --  --  11-12 @ 23:11   No growth to date.  --  --  11-12 @ 22:06   No growth  --  --    RADIOLOGY & ADDITIONAL TESTS:  Foot XR 11/12:  Bony destruction and lucency at the base of the proximal phalanges of the second and third digits as well as the distal aspects of the second and third metatarsals consistent with osteomyelitis.    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    Vascular calcifications.    Imaging Personally Reviewed:  [ ] YES     Consultant(s) Notes Reviewed:      Care Discussed with Consultants/Other Providers: Patient is a 74y old  Male who presents with a chief complaint of Persistent RIGHT forefoot pain, swelling for the past 6 days but worsening since 9/28/19 (15 Nov 2019 12:15)     INTERVAL HPI/OVERNIGHT EVENTS: No acute events overnight.    SUBJECTIVE: Pt seen and examined this morning. States he feels well. Denies any pain in his feet. States that his shoulder pain is improved but still present, pt attributes it to being in bed for too long. Denies any CP, SOB, fevers, n/v, abdominal pain, or diarrhea.    MEDICATIONS  (STANDING):  aspirin enteric coated 81 milliGRAM(s) Oral daily  dextrose 5%. 1000 milliLiter(s) (50 mL/Hr) IV Continuous <Continuous>  dextrose 50% Injectable 12.5 Gram(s) IV Push once  dextrose 50% Injectable 25 Gram(s) IV Push once  enoxaparin Injectable 40 milliGRAM(s) SubCutaneous daily  insulin lispro (HumaLOG) corrective regimen sliding scale   SubCutaneous three times a day before meals  insulin lispro (HumaLOG) corrective regimen sliding scale   SubCutaneous at bedtime  metoprolol succinate  milliGRAM(s) Oral daily  piperacillin/tazobactam IVPB.. 3.375 Gram(s) IV Intermittent every 8 hours  sodium chloride 0.9%. 1000 milliLiter(s) (75 mL/Hr) IV Continuous <Continuous>  vancomycin  IVPB 1000 milliGRAM(s) IV Intermittent every 12 hours    MEDICATIONS  (PRN):  acetaminophen   Tablet .. 650 milliGRAM(s) Oral every 6 hours PRN Mild Pain (1 - 3)  dextrose 40% Gel 15 Gram(s) Oral once PRN Blood Glucose LESS THAN 70 milliGRAM(s)/deciliter  glucagon  Injectable 1 milliGRAM(s) IntraMuscular once PRN Glucose LESS THAN 70 milligrams/deciliter  oxyCODONE    IR 5 milliGRAM(s) Oral every 4 hours PRN Moderate Pain (4 - 6)    Allergies    No Known Allergies    Intolerances        Vital Signs Last 24 Hrs  T(C): 36.8 (16 Nov 2019 05:03), Max: 37.1 (15 Nov 2019 21:35)  T(F): 98.3 (16 Nov 2019 05:03), Max: 98.8 (15 Nov 2019 21:35)  HR: 71 (16 Nov 2019 05:03) (65 - 73)  BP: 135/71 (16 Nov 2019 05:03) (135/71 - 142/75)  BP(mean): --  RR: 18 (16 Nov 2019 05:03) (18 - 18)  SpO2: 99% (16 Nov 2019 05:03) (96% - 99%)    PHYSICAL EXAM:  GENERAL: NAD, well-developed  HEENT: NC/AT,  NECK: Supple, No JVD, no carotid bruits b/l  CHEST/LUNG: Clear to auscultation bilaterally; No rales, rhonchi, wheezing, or rubs  CARD: Regular rate and rhythm; No murmurs, rubs, or gallops. No LE edema  ABDOMEN: Soft, Nontender, Nondistended; Bowel sounds present  EXTREMITIES:  2+ Peripheral Pulses, Right foot in dressing after podiatric procedure.  NEURO: AOx3, moving all extremities    LABS:                        12.2   6.69  )-----------( 167      ( 15 Nov 2019 07:22 )             37.0     15 Nov 2019 07:12    137    |  101    |  12     ----------------------------<  115    3.8     |  30     |  0.93     Ca    9.0        15 Nov 2019 07:12  Phos  3.3       15 Nov 2019 07:12  Mg     2.2       15 Nov 2019 07:12    CAPILLARY BLOOD GLUCOSE    POCT Blood Glucose.: 107 mg/dL (15 Nov 2019 21:28)  POCT Blood Glucose.: 141 mg/dL (15 Nov 2019 17:51)  POCT Blood Glucose.: 116 mg/dL (15 Nov 2019 13:48)  POCT Blood Glucose.: 127 mg/dL (15 Nov 2019 08:27)    BLOOD CULTURE  11-15 @ 02:14   No growth  --  --  11-15 @ 02:03   No growth  --  --  11-15 @ 01:25   No growth  --  --  11-12 @ 23:11   No growth to date.  --  --  11-12 @ 22:06   No growth  --  --    RADIOLOGY & ADDITIONAL TESTS:  Foot XR 11/12:  Bony destruction and lucency at the base of the proximal phalanges of the second and third digits as well as the distal aspects of the second and third metatarsals consistent with osteomyelitis.    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    Vascular calcifications.    Imaging Personally Reviewed:  [ ] YES     Consultant(s) Notes Reviewed:      Care Discussed with Consultants/Other Providers:

## 2019-11-17 LAB
ANION GAP SERPL CALC-SCNC: 12 MMOL/L — SIGNIFICANT CHANGE UP (ref 5–17)
BUN SERPL-MCNC: 17 MG/DL — SIGNIFICANT CHANGE UP (ref 7–23)
CALCIUM SERPL-MCNC: 9.1 MG/DL — SIGNIFICANT CHANGE UP (ref 8.4–10.5)
CHLORIDE SERPL-SCNC: 102 MMOL/L — SIGNIFICANT CHANGE UP (ref 96–108)
CO2 SERPL-SCNC: 27 MMOL/L — SIGNIFICANT CHANGE UP (ref 22–31)
CREAT SERPL-MCNC: 0.9 MG/DL — SIGNIFICANT CHANGE UP (ref 0.5–1.3)
GLUCOSE SERPL-MCNC: 139 MG/DL — HIGH (ref 70–99)
HCT VFR BLD CALC: 36.3 % — LOW (ref 39–50)
HGB BLD-MCNC: 12.2 G/DL — LOW (ref 13–17)
MAGNESIUM SERPL-MCNC: 2.1 MG/DL — SIGNIFICANT CHANGE UP (ref 1.6–2.6)
MCHC RBC-ENTMCNC: 30.1 PG — SIGNIFICANT CHANGE UP (ref 27–34)
MCHC RBC-ENTMCNC: 33.6 GM/DL — SIGNIFICANT CHANGE UP (ref 32–36)
MCV RBC AUTO: 89.6 FL — SIGNIFICANT CHANGE UP (ref 80–100)
NRBC # BLD: 0 /100 WBCS — SIGNIFICANT CHANGE UP (ref 0–0)
PHOSPHATE SERPL-MCNC: 2.8 MG/DL — SIGNIFICANT CHANGE UP (ref 2.5–4.5)
PLATELET # BLD AUTO: 161 K/UL — SIGNIFICANT CHANGE UP (ref 150–400)
POTASSIUM SERPL-MCNC: 3.9 MMOL/L — SIGNIFICANT CHANGE UP (ref 3.5–5.3)
POTASSIUM SERPL-SCNC: 3.9 MMOL/L — SIGNIFICANT CHANGE UP (ref 3.5–5.3)
RBC # BLD: 4.05 M/UL — LOW (ref 4.2–5.8)
RBC # FLD: 12.8 % — SIGNIFICANT CHANGE UP (ref 10.3–14.5)
SODIUM SERPL-SCNC: 141 MMOL/L — SIGNIFICANT CHANGE UP (ref 135–145)
VANCOMYCIN TROUGH SERPL-MCNC: 11 UG/ML — SIGNIFICANT CHANGE UP (ref 10–20)
WBC # BLD: 6.01 K/UL — SIGNIFICANT CHANGE UP (ref 3.8–10.5)
WBC # FLD AUTO: 6.01 K/UL — SIGNIFICANT CHANGE UP (ref 3.8–10.5)

## 2019-11-17 PROCEDURE — 99233 SBSQ HOSP IP/OBS HIGH 50: CPT | Mod: GC

## 2019-11-17 PROCEDURE — 99232 SBSQ HOSP IP/OBS MODERATE 35: CPT

## 2019-11-17 RX ORDER — VANCOMYCIN HCL 1 G
1250 VIAL (EA) INTRAVENOUS EVERY 12 HOURS
Refills: 0 | Status: DISCONTINUED | OUTPATIENT
Start: 2019-11-17 | End: 2019-11-19

## 2019-11-17 RX ADMIN — PIPERACILLIN AND TAZOBACTAM 25 GRAM(S): 4; .5 INJECTION, POWDER, LYOPHILIZED, FOR SOLUTION INTRAVENOUS at 05:44

## 2019-11-17 RX ADMIN — Medication 250 MILLIGRAM(S): at 10:00

## 2019-11-17 RX ADMIN — PIPERACILLIN AND TAZOBACTAM 25 GRAM(S): 4; .5 INJECTION, POWDER, LYOPHILIZED, FOR SOLUTION INTRAVENOUS at 22:11

## 2019-11-17 RX ADMIN — OXYCODONE HYDROCHLORIDE 5 MILLIGRAM(S): 5 TABLET ORAL at 18:35

## 2019-11-17 RX ADMIN — CHLORHEXIDINE GLUCONATE 1 APPLICATION(S): 213 SOLUTION TOPICAL at 10:03

## 2019-11-17 RX ADMIN — Medication 81 MILLIGRAM(S): at 11:58

## 2019-11-17 RX ADMIN — OXYCODONE HYDROCHLORIDE 5 MILLIGRAM(S): 5 TABLET ORAL at 19:30

## 2019-11-17 RX ADMIN — Medication 1: at 09:58

## 2019-11-17 RX ADMIN — ENOXAPARIN SODIUM 40 MILLIGRAM(S): 100 INJECTION SUBCUTANEOUS at 11:58

## 2019-11-17 RX ADMIN — PIPERACILLIN AND TAZOBACTAM 25 GRAM(S): 4; .5 INJECTION, POWDER, LYOPHILIZED, FOR SOLUTION INTRAVENOUS at 15:36

## 2019-11-17 RX ADMIN — Medication 100 MILLIGRAM(S): at 05:44

## 2019-11-17 NOTE — PHYSICAL THERAPY INITIAL EVALUATION ADULT - PERTINENT HX OF CURRENT PROBLEM, REHAB EVAL
Pt is a 75 y/o male with PMHx of DM2, HTN who presents with R foot pain, suspected osteomyelitis of 2nd and 3rd toes.
Pt is a 75 y/o male with PMHx of DM2, HTN who presents with R foot pain, suspected osteomyelitis of 2nd and 3rd toes. Pt now s/p partial amputation of R 2nd ray and 3rd MTPJ arthroplasty 11/14.

## 2019-11-17 NOTE — PROGRESS NOTE ADULT - PROBLEM SELECTOR PLAN 6
DVT: c/w Lovenox 40mg daily  Diet: CC  Dispo: likely home early this week pending OR cultures with IV antibiotics DVT: c/w Lovenox 40mg daily  Diet: CC  Dispo: likely home early this week pending OR cultures/biopsy with IV antibiotics

## 2019-11-17 NOTE — PHYSICAL THERAPY INITIAL EVALUATION ADULT - MANUAL MUSCLE TESTING RESULTS, REHAB EVAL
B UE and B LE >3+/5 upon functional assessment against gravity./grossly assessed due to
B UE and B LE >3+/5 upon functional assessment against gravity./grossly assessed due to

## 2019-11-17 NOTE — PROGRESS NOTE ADULT - ASSESSMENT
74 m with DM, HTN, CAD s/p CABG, previous L 2nd toe amp, developed R toe swelling and pain 9/2019 s/p partial amputation but never improved and for the last week had worsening edema and pain, an outside xray was s/o osteo, he was started on clinda and when the MRI showed osteo and pt did not improve, came to ER. He denied fever, chills, diarrhea   here afebrile, WBC normal, ESR: 28, CRP: 0.62  xray: Bony destruction and lucency at the base of the proximal phalanges of the second and third digits as well as the distal aspects of the second and third metatarsals consistent with osteomyelitis.    diabetic foot infection, R foot cellulitis and osteomyelitis (had partial 2nd toe amp 9/2019 with worsening R foot edema, erythema and pain)  blood cx negative      Plan:  * c/w vanco, increase dose to 1250 q 12 and check the trough prior to 4th dose.  * c/w zosyn 3.375 q 8 for now, will adjust as per the cultures  * f/u the OR cultures, NTD   * f/u path   * high suspicion for residual infection, s/p picc line

## 2019-11-17 NOTE — PROGRESS NOTE ADULT - SUBJECTIVE AND OBJECTIVE BOX
Podiatry pager #: [phone] ([place])/ [phone] ([facility])    Patient is a 74y old  Male who presents with a chief complaint of Persistent RIGHT forefoot pain, swelling for the past 6 days but worsening since 9/28/19 (17 Nov 2019 08:25)       INTERVAL HPI/OVERNIGHT EVENTS:  Patient seen and evaluated at bedside.  Pt is resting comfortable in NAD. Denies N/V/F/C.     Allergies    No Known Allergies    Intolerances        Vital Signs Last 24 Hrs  T(C): 36.7 (17 Nov 2019 05:05), Max: 36.7 (16 Nov 2019 21:19)  T(F): 98.1 (17 Nov 2019 05:05), Max: 98.1 (17 Nov 2019 05:05)  HR: 62 (17 Nov 2019 05:05) (61 - 65)  BP: 129/72 (17 Nov 2019 05:05) (127/79 - 130/73)  BP(mean): --  RR: 18 (17 Nov 2019 05:05) (18 - 18)  SpO2: 98% (17 Nov 2019 05:05) (97% - 99%)    LABS:                        12.2   6.01  )-----------( 161      ( 17 Nov 2019 07:10 )             36.3     11-17    141  |  102  |  17  ----------------------------<  139<H>  3.9   |  27  |  0.90    Ca    9.1      17 Nov 2019 07:10  Phos  2.8     11-17  Mg     2.1     11-17          CAPILLARY BLOOD GLUCOSE      POCT Blood Glucose.: 155 mg/dL (17 Nov 2019 09:03)  POCT Blood Glucose.: 154 mg/dL (16 Nov 2019 22:12)  POCT Blood Glucose.: 135 mg/dL (16 Nov 2019 17:35)      Lower Extremity Physical Exam:  Vascular: DP/PT 1/4, B/L, CFT <3 seconds B/L, Temperature gradient warm to cool, B/L.   Neuro: Epicritic sensation diminished to the level of digits, B/L.  Musculoskeletal/Ortho: s/p partial 2nd toe amp b/l    s/p right foot partial 2nd ray resection, 3rd MPJ arthroplasty closed over antibiotic beads (DOS 11/14/19)  Sutures intact w/ no dehiscence, flap viable, no hematoma and no active bleeding however 3cc sanguinous drianage was expressed w/ compression. Site is stable w/ no signs of infection.

## 2019-11-17 NOTE — PHYSICAL THERAPY INITIAL EVALUATION ADULT - ADDITIONAL COMMENTS
Pt lives in a private home with no steps to negotiate. PTA pt was (I) with ADLs and functional mobility with no assistive device. Pt goes to the gym regularly.

## 2019-11-17 NOTE — PROGRESS NOTE ADULT - PROBLEM SELECTOR PLAN 1
outpatient MRI with signs of 2nd R toe osteo, +ESR/CRP   - s/p resection of right 2nd metatarsal and 3rd metatarsophalangeal joint, cultures sent, NGTD,  pending final results but high concern for residual infection intraop  - c/w vanc 1 gram q12, monitor vanc trough, 9.3 last  - c/w zosyn 3.375gram q8  -s/p PICC line today, f/u CXR  -per ID and podiatry f/u OR cultures to dictate home IV antibiotics  -afebrile, no leukocytosis, HD stable  -pain control with tylenol and oxy prn outpatient MRI with signs of 2nd R toe osteo, +ESR/CRP   - s/p resection of right 2nd metatarsal and 3rd metatarsophalangeal joint, cultures sent NGTD,  pending final pathresults but high concern for residual infection   - vanc 9.3, per ID increase to vanc 1.25 gram q12, monitor vanc trough,  - c/w zosyn 3.375gram q8  -s/p PICC line today, CXR confirmed  -per ID and podiatry f/u OR cultures/biospy to dictate home IV antibiotics  -afebrile, no leukocytosis, HD stable  -pain control with tylenol and oxy prn

## 2019-11-17 NOTE — PROGRESS NOTE ADULT - ASSESSMENT
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 now presenting for worsening right foot pain with recent outpatient MRI showing R 2nd digit OM and foot cellulitis now s/p short course of clindamycin with no improvement, s/p podiatric resection with high concern for residual infection s/p PICC line for IV antibiotic at home

## 2019-11-17 NOTE — PROGRESS NOTE ADULT - SUBJECTIVE AND OBJECTIVE BOX
74y old  Male who presents with a chief complaint of Persistent RIGHT forefoot pain, swelling for the past 6 days but worsening since 9/28/19 (17 Nov 2019 12:24)      Interval history/ROS:  Afebrile, no cough, no SOB, no N/V/D, no abdominal pain, denies dysuria. No rash     No Known Allergies    Antimicrobials:  piperacillin/tazobactam IVPB.. 3.375 Gram(s) IV Intermittent every 8 hours  vancomycin  IVPB 1000 milliGRAM(s) IV Intermittent every 12 hours      Vital Signs Last 24 Hrs  T(C): 36.7 (11-17-19 @ 05:05), Max: 36.7 (11-16-19 @ 21:19)  T(F): 98.1 (11-17-19 @ 05:05), Max: 98.1 (11-17-19 @ 05:05)  HR: 62 (11-17-19 @ 05:05) (61 - 65)  BP: 129/72 (11-17-19 @ 05:05) (127/79 - 130/73)  BP(mean): --  RR: 18 (11-17-19 @ 05:05) (18 - 18)  SpO2: 98% (11-17-19 @ 05:05) (97% - 99%)      PHYSICAL EXAM:  Patient in no acute distress. AAOX3.  No icterus, no oral ulcers.  Cardiovascular: S1S2 normal.  Lungs: + air entry B/L lung fields.  Gastrointestinal: soft, nontender, nondistended.  Extremities: no edema. Rt foot with dressing.   Rt arm PICC                           12.2   6.01  )-----------( 161      ( 17 Nov 2019 07:10 )             36.3   11-17    141  |  102  |  17  ----------------------------<  139<H>  3.9   |  27  |  0.90    Ca    9.1      17 Nov 2019 07:10  Phos  2.8     11-17  Mg     2.1     11-17      Culture - Surgical Swab (collected 15 Nov 2019 03:30)  Source: .Surgical Swab deep O.R. culture right foot  Preliminary Report (16 Nov 2019 08:33):    No growth    Culture - Tissue with Gram Stain (collected 15 Nov 2019 02:14)  Source: .Tissue Other  Gram Stain (15 Nov 2019 03:18):    Rare polymorphonuclear leukocytes seen per low power field    No organisms seen per oil power field  Preliminary Report (15 Nov 2019 19:16):    No growth    Culture - Tissue with Gram Stain (collected 15 Nov 2019 02:03)  Source: .Tissue Other  Gram Stain (15 Nov 2019 03:18):    No polymorphonuclear leukocytes seen per low power field    No organisms seen per oil power field  Preliminary Report (15 Nov 2019 19:17):    No growth    Culture - Tissue with Gram Stain (collected 15 Nov 2019 01:25)  Source: .Tissue Other  Gram Stain (15 Nov 2019 03:18):    No polymorphonuclear leukocytes seen per low power field    No organisms seen per oil power field  Preliminary Report (15 Nov 2019 19:19):    No growth      Radiology:  < from: Xray Chest 1 View- PORTABLE-Urgent (11.16.19 @ 14:10) >  IMPRESSION: Right upper extremity PICC line in good position.

## 2019-11-17 NOTE — PROGRESS NOTE ADULT - ASSESSMENT
73 yo M s/p right foot partial 2nd ray resection (DOS 11/14/19)  -Pt seen and evaluated, POD #3  -Sutures intact w/ no dehiscence, flap viable, no hematoma and no active bleeding however 3cc sanguinous drianage was expressed w/ compression (likely from antibiotic beads). Site is stable w/ no signs of infection.  -Dressed w/ betadine and sterile dry compressive dressing  -High concern for residual infection  -Will need long term IV abx via PICC   -Prelim OR cultures are negative however pt has been on oral antibiotics for 2-3 weeks, which may be reflected in intra-op culture data  -Pod stable for discharge after final OR data is back and d/c on abx per ID  -Keep dressing clean dry and intact   -Discussed w/ attending

## 2019-11-17 NOTE — PROGRESS NOTE ADULT - SUBJECTIVE AND OBJECTIVE BOX
PROGRESS NOTE:   Authored by Dr. Jun Parrish MD, PAGER: [phone] [facility], [id number] [facility]    Patient is a 74y old  Male who presents with a chief complaint of Persistent RIGHT forefoot pain, swelling for the past 6 days but worsening since 9/28/19 (16 Nov 2019 13:12)      SUBJECTIVE / OVERNIGHT EVENTS:    ADDITIONAL REVIEW OF SYSTEMS:    MEDICATIONS  (STANDING):  aspirin enteric coated 81 milliGRAM(s) Oral daily  chlorhexidine 4% Liquid 1 Application(s) Topical <User Schedule>  dextrose 5%. 1000 milliLiter(s) (50 mL/Hr) IV Continuous <Continuous>  dextrose 50% Injectable 12.5 Gram(s) IV Push once  dextrose 50% Injectable 25 Gram(s) IV Push once  enoxaparin Injectable 40 milliGRAM(s) SubCutaneous daily  insulin lispro (HumaLOG) corrective regimen sliding scale   SubCutaneous three times a day before meals  insulin lispro (HumaLOG) corrective regimen sliding scale   SubCutaneous at bedtime  metoprolol succinate  milliGRAM(s) Oral daily  piperacillin/tazobactam IVPB.. 3.375 Gram(s) IV Intermittent every 8 hours  vancomycin  IVPB 1000 milliGRAM(s) IV Intermittent every 12 hours    MEDICATIONS  (PRN):  acetaminophen   Tablet .. 650 milliGRAM(s) Oral every 6 hours PRN Mild Pain (1 - 3)  dextrose 40% Gel 15 Gram(s) Oral once PRN Blood Glucose LESS THAN 70 milliGRAM(s)/deciliter  glucagon  Injectable 1 milliGRAM(s) IntraMuscular once PRN Glucose LESS THAN 70 milligrams/deciliter  oxyCODONE    IR 5 milliGRAM(s) Oral every 4 hours PRN Moderate Pain (4 - 6)  sodium chloride 0.9% lock flush 10 milliLiter(s) IV Push every 1 hour PRN Pre/post blood products, medications, blood draw, and to maintain line patency      CAPILLARY BLOOD GLUCOSE      POCT Blood Glucose.: 154 mg/dL (16 Nov 2019 22:12)  POCT Blood Glucose.: 135 mg/dL (16 Nov 2019 17:35)  POCT Blood Glucose.: 173 mg/dL (16 Nov 2019 12:00)  POCT Blood Glucose.: 133 mg/dL (16 Nov 2019 08:27)    I&O's Summary    16 Nov 2019 07:01  -  17 Nov 2019 07:00  --------------------------------------------------------  IN: 1290 mL / OUT: 1200 mL / NET: 90 mL    17 Nov 2019 07:01  -  17 Nov 2019 08:25  --------------------------------------------------------  IN: 0 mL / OUT: 325 mL / NET: -325 mL        PHYSICAL EXAM:  Vital Signs Last 24 Hrs  T(C): 36.7 (17 Nov 2019 05:05), Max: 36.7 (16 Nov 2019 21:19)  T(F): 98.1 (17 Nov 2019 05:05), Max: 98.1 (17 Nov 2019 05:05)  HR: 62 (17 Nov 2019 05:05) (61 - 65)  BP: 129/72 (17 Nov 2019 05:05) (127/79 - 130/73)  BP(mean): --  RR: 18 (17 Nov 2019 05:05) (18 - 18)  SpO2: 98% (17 Nov 2019 05:05) (97% - 99%)    CONSTITUTIONAL: NAD, well-developed  RESPIRATORY: Normal respiratory effort; lungs are clear to auscultation bilaterally  CARDIOVASCULAR: Regular rate and rhythm, normal S1 and S2, no murmur/rub/gallop; No lower extremity edema; Peripheral pulses are 2+ bilaterally  ABDOMEN: Nontender to palpation, normoactive bowel sounds, no rebound/guarding; No hepatosplenomegaly  MUSCLOSKELETAL: no clubbing or cyanosis of digits; no joint swelling or tenderness to palpation  PSYCH: A+O to person, place, and time; affect appropriate    LABS:                        12.2   6.01  )-----------( 161      ( 17 Nov 2019 07:10 )             36.3     11-17    141  |  102  |  17  ----------------------------<  139<H>  3.9   |  27  |  0.90    Ca    9.1      17 Nov 2019 07:10  Phos  2.8     11-17  Mg     2.1     11-17                Culture - Surgical Swab (collected 15 Nov 2019 03:30)  Source: .Surgical Swab deep O.R. culture right foot  Preliminary Report (16 Nov 2019 08:33):    No growth    Culture - Tissue with Gram Stain (collected 15 Nov 2019 02:14)  Source: .Tissue Other  Gram Stain (15 Nov 2019 03:18):    Rare polymorphonuclear leukocytes seen per low power field    No organisms seen per oil power field  Preliminary Report (15 Nov 2019 19:16):    No growth    Culture - Tissue with Gram Stain (collected 15 Nov 2019 02:03)  Source: .Tissue Other  Gram Stain (15 Nov 2019 03:18):    No polymorphonuclear leukocytes seen per low power field    No organisms seen per oil power field  Preliminary Report (15 Nov 2019 19:17):    No growth    Culture - Tissue with Gram Stain (collected 15 Nov 2019 01:25)  Source: .Tissue Other  Gram Stain (15 Nov 2019 03:18):    No polymorphonuclear leukocytes seen per low power field    No organisms seen per oil power field  Preliminary Report (15 Nov 2019 19:19):    No growth        RADIOLOGY & ADDITIONAL TESTS:  Results Reviewed:   Imaging Personally Reviewed:  Electrocardiogram Personally Reviewed:    COORDINATION OF CARE:  Care Discussed with Consultants/Other Providers [Y/N]:  Prior or Outpatient Records Reviewed [Y/N]: PROGRESS NOTE:   Authored by Dr. Jun Parrish MD, PAGER: [phone] [facility], [id number] [facility]    Patient is a 74y old  Male who presents with a chief complaint of Persistent RIGHT forefoot pain, swelling for the past 6 days but worsening since 9/28/19 (16 Nov 2019 13:12)      SUBJECTIVE / OVERNIGHT EVENTS: no overnight events. Feels well, no f/c, lower extremity pain, cp, sob    ADDITIONAL REVIEW OF SYSTEMS: negative     MEDICATIONS  (STANDING):  aspirin enteric coated 81 milliGRAM(s) Oral daily  chlorhexidine 4% Liquid 1 Application(s) Topical <User Schedule>  dextrose 5%. 1000 milliLiter(s) (50 mL/Hr) IV Continuous <Continuous>  dextrose 50% Injectable 12.5 Gram(s) IV Push once  dextrose 50% Injectable 25 Gram(s) IV Push once  enoxaparin Injectable 40 milliGRAM(s) SubCutaneous daily  insulin lispro (HumaLOG) corrective regimen sliding scale   SubCutaneous three times a day before meals  insulin lispro (HumaLOG) corrective regimen sliding scale   SubCutaneous at bedtime  metoprolol succinate  milliGRAM(s) Oral daily  piperacillin/tazobactam IVPB.. 3.375 Gram(s) IV Intermittent every 8 hours  vancomycin  IVPB 1000 milliGRAM(s) IV Intermittent every 12 hours    MEDICATIONS  (PRN):  acetaminophen   Tablet .. 650 milliGRAM(s) Oral every 6 hours PRN Mild Pain (1 - 3)  dextrose 40% Gel 15 Gram(s) Oral once PRN Blood Glucose LESS THAN 70 milliGRAM(s)/deciliter  glucagon  Injectable 1 milliGRAM(s) IntraMuscular once PRN Glucose LESS THAN 70 milligrams/deciliter  oxyCODONE    IR 5 milliGRAM(s) Oral every 4 hours PRN Moderate Pain (4 - 6)  sodium chloride 0.9% lock flush 10 milliLiter(s) IV Push every 1 hour PRN Pre/post blood products, medications, blood draw, and to maintain line patency      CAPILLARY BLOOD GLUCOSE      POCT Blood Glucose.: 154 mg/dL (16 Nov 2019 22:12)  POCT Blood Glucose.: 135 mg/dL (16 Nov 2019 17:35)  POCT Blood Glucose.: 173 mg/dL (16 Nov 2019 12:00)  POCT Blood Glucose.: 133 mg/dL (16 Nov 2019 08:27)    I&O's Summary    16 Nov 2019 07:01  -  17 Nov 2019 07:00  --------------------------------------------------------  IN: 1290 mL / OUT: 1200 mL / NET: 90 mL    17 Nov 2019 07:01  -  17 Nov 2019 08:25  --------------------------------------------------------  IN: 0 mL / OUT: 325 mL / NET: -325 mL        PHYSICAL EXAM:  Vital Signs Last 24 Hrs  T(C): 36.7 (17 Nov 2019 05:05), Max: 36.7 (16 Nov 2019 21:19)  T(F): 98.1 (17 Nov 2019 05:05), Max: 98.1 (17 Nov 2019 05:05)  HR: 62 (17 Nov 2019 05:05) (61 - 65)  BP: 129/72 (17 Nov 2019 05:05) (127/79 - 130/73)  BP(mean): --  RR: 18 (17 Nov 2019 05:05) (18 - 18)  SpO2: 98% (17 Nov 2019 05:05) (97% - 99%)    PHYSICAL EXAM:  GENERAL: NAD, well-developed  HEENT: NC/AT,  NECK: Supple, No JVD, no carotid bruits b/l  CHEST/LUNG: Clear to auscultation bilaterally; No rales, rhonchi, wheezing, or rubs  CARD: Regular rate and rhythm; No murmurs, rubs, or gallops. No LE edema  ABDOMEN: Soft, Nontender, Nondistended; Bowel sounds present  EXTREMITIES:  2+ Peripheral Pulses, Right foot in dressing after podiatric procedure.  NEURO: AOx3, moving all extremities  LABS:                        12.2   6.01  )-----------( 161      ( 17 Nov 2019 07:10 )             36.3     11-17    141  |  102  |  17  ----------------------------<  139<H>  3.9   |  27  |  0.90    Ca    9.1      17 Nov 2019 07:10  Phos  2.8     11-17  Mg     2.1     11-17                Culture - Surgical Swab (collected 15 Nov 2019 03:30)  Source: .Surgical Swab deep O.R. culture right foot  Preliminary Report (16 Nov 2019 08:33):    No growth    Culture - Tissue with Gram Stain (collected 15 Nov 2019 02:14)  Source: .Tissue Other  Gram Stain (15 Nov 2019 03:18):    Rare polymorphonuclear leukocytes seen per low power field    No organisms seen per oil power field  Preliminary Report (15 Nov 2019 19:16):    No growth    Culture - Tissue with Gram Stain (collected 15 Nov 2019 02:03)  Source: .Tissue Other  Gram Stain (15 Nov 2019 03:18):    No polymorphonuclear leukocytes seen per low power field    No organisms seen per oil power field  Preliminary Report (15 Nov 2019 19:17):    No growth    Culture - Tissue with Gram Stain (collected 15 Nov 2019 01:25)  Source: .Tissue Other  Gram Stain (15 Nov 2019 03:18):    No polymorphonuclear leukocytes seen per low power field    No organisms seen per oil power field  Preliminary Report (15 Nov 2019 19:19):    No growth        RADIOLOGY & ADDITIONAL TESTS:  Results Reviewed:   Imaging Personally Reviewed:  Electrocardiogram Personally Reviewed:    COORDINATION OF CARE:  Care Discussed with Consultants/Other Providers [Y/N]:  Prior or Outpatient Records Reviewed [Y/N]:

## 2019-11-18 LAB
CULTURE RESULTS: SIGNIFICANT CHANGE UP
CULTURE RESULTS: SIGNIFICANT CHANGE UP
SPECIMEN SOURCE: SIGNIFICANT CHANGE UP
SPECIMEN SOURCE: SIGNIFICANT CHANGE UP

## 2019-11-18 PROCEDURE — 99232 SBSQ HOSP IP/OBS MODERATE 35: CPT | Mod: GC

## 2019-11-18 PROCEDURE — 99233 SBSQ HOSP IP/OBS HIGH 50: CPT

## 2019-11-18 RX ADMIN — Medication 166.67 MILLIGRAM(S): at 22:14

## 2019-11-18 RX ADMIN — Medication 166.67 MILLIGRAM(S): at 12:19

## 2019-11-18 RX ADMIN — Medication 100 MILLIGRAM(S): at 05:56

## 2019-11-18 RX ADMIN — PIPERACILLIN AND TAZOBACTAM 25 GRAM(S): 4; .5 INJECTION, POWDER, LYOPHILIZED, FOR SOLUTION INTRAVENOUS at 15:11

## 2019-11-18 RX ADMIN — ENOXAPARIN SODIUM 40 MILLIGRAM(S): 100 INJECTION SUBCUTANEOUS at 12:19

## 2019-11-18 RX ADMIN — CHLORHEXIDINE GLUCONATE 1 APPLICATION(S): 213 SOLUTION TOPICAL at 10:32

## 2019-11-18 RX ADMIN — Medication 166.67 MILLIGRAM(S): at 00:57

## 2019-11-18 RX ADMIN — OXYCODONE HYDROCHLORIDE 5 MILLIGRAM(S): 5 TABLET ORAL at 20:30

## 2019-11-18 RX ADMIN — Medication 81 MILLIGRAM(S): at 12:19

## 2019-11-18 RX ADMIN — Medication 1: at 13:42

## 2019-11-18 RX ADMIN — PIPERACILLIN AND TAZOBACTAM 25 GRAM(S): 4; .5 INJECTION, POWDER, LYOPHILIZED, FOR SOLUTION INTRAVENOUS at 05:56

## 2019-11-18 RX ADMIN — OXYCODONE HYDROCHLORIDE 5 MILLIGRAM(S): 5 TABLET ORAL at 19:54

## 2019-11-18 NOTE — PROGRESS NOTE ADULT - SUBJECTIVE AND OBJECTIVE BOX
PROGRESS NOTE:   Authored By: Renaldo Guerrero MD   Pager: [phone], [phone]    Patient is a 74y old  Male who presents with a chief complaint of Persistent RIGHT forefoot pain, swelling for the past 6 days but worsening since 9/28/19 (17 Nov 2019 13:15)    OVERNIGHT EVENTS: No acute events overnight    SUBJECTIVE: Pt seen and examined at bedside this morning.     ADDITIONAL REVIEW OF SYSTEMS:    MEDICATIONS  (STANDING):  aspirin enteric coated 81 milliGRAM(s) Oral daily  chlorhexidine 4% Liquid 1 Application(s) Topical <User Schedule>  dextrose 5%. 1000 milliLiter(s) (50 mL/Hr) IV Continuous <Continuous>  dextrose 50% Injectable 12.5 Gram(s) IV Push once  dextrose 50% Injectable 25 Gram(s) IV Push once  enoxaparin Injectable 40 milliGRAM(s) SubCutaneous daily  insulin lispro (HumaLOG) corrective regimen sliding scale   SubCutaneous three times a day before meals  insulin lispro (HumaLOG) corrective regimen sliding scale   SubCutaneous at bedtime  metoprolol succinate  milliGRAM(s) Oral daily  piperacillin/tazobactam IVPB.. 3.375 Gram(s) IV Intermittent every 8 hours  vancomycin  IVPB 1250 milliGRAM(s) IV Intermittent every 12 hours    MEDICATIONS  (PRN):  acetaminophen   Tablet .. 650 milliGRAM(s) Oral every 6 hours PRN Mild Pain (1 - 3)  dextrose 40% Gel 15 Gram(s) Oral once PRN Blood Glucose LESS THAN 70 milliGRAM(s)/deciliter  glucagon  Injectable 1 milliGRAM(s) IntraMuscular once PRN Glucose LESS THAN 70 milligrams/deciliter  oxyCODONE    IR 5 milliGRAM(s) Oral every 4 hours PRN Moderate Pain (4 - 6)  sodium chloride 0.9% lock flush 10 milliLiter(s) IV Push every 1 hour PRN Pre/post blood products, medications, blood draw, and to maintain line patency    CAPILLARY BLOOD GLUCOSE    POCT Blood Glucose.: 152 mg/dL (17 Nov 2019 22:11)  POCT Blood Glucose.: 137 mg/dL (17 Nov 2019 17:24)  POCT Blood Glucose.: 131 mg/dL (17 Nov 2019 13:07)  POCT Blood Glucose.: 155 mg/dL (17 Nov 2019 09:03)    I&O's Summary    16 Nov 2019 07:01  -  17 Nov 2019 07:00  --------------------------------------------------------  IN: 1290 mL / OUT: 1200 mL / NET: 90 mL    17 Nov 2019 07:01  -  18 Nov 2019 06:08  --------------------------------------------------------  IN: 930 mL / OUT: 1250 mL / NET: -320 mL    PHYSICAL EXAM:  Vital Signs Last 24 Hrs  T(C): 36.5 (18 Nov 2019 04:43), Max: 36.7 (17 Nov 2019 21:22)  T(F): 97.7 (18 Nov 2019 04:43), Max: 98 (17 Nov 2019 21:22)  HR: 62 (18 Nov 2019 04:43) (62 - 77)  BP: 133/77 (18 Nov 2019 04:43) (132/71 - 135/70)  BP(mean): --  RR: 18 (18 Nov 2019 04:43) (18 - 19)  SpO2: 98% (18 Nov 2019 04:43) (97% - 99%)    GENERAL: NAD, well-developed  HEENT: NC/AT,  NECK: Supple, No JVD, no carotid bruits b/l  CHEST/LUNG: Clear to auscultation bilaterally; No rales, rhonchi, wheezing, or rubs  CARD: Regular rate and rhythm; No murmurs, rubs, or gallops. No LE edema  ABDOMEN: Soft, Nontender, Nondistended; Bowel sounds present  EXTREMITIES:  2+ Peripheral Pulses, Right foot in dressing after podiatric procedure.  NEURO: AOx3, moving all extremities      LABS:                        12.2   6.01  )-----------( 161      ( 17 Nov 2019 07:10 )             36.3     11-17    141  |  102  |  17  ----------------------------<  139<H>  3.9   |  27  |  0.90    Ca    9.1      17 Nov 2019 07:10  Phos  2.8     11-17  Mg     2.1     11-17    RADIOLOGY & ADDITIONAL TESTS:    Results Reviewed:   Imaging Personally Reviewed:  Electrocardiogram Personally Reviewed:    COORDINATION OF CARE:  Care Discussed with Consultants/Other Providers [Y/N]:  Prior or Outpatient Records Reviewed [Y/N]: PROGRESS NOTE:   Authored By: Renaldo Guerrero MD   Pager: [phone]    Patient is a 74y old  Male who presents with a chief complaint of Persistent RIGHT forefoot pain, swelling for the past 6 days but worsening since 9/28/19 (17 Nov 2019 13:15)    OVERNIGHT EVENTS: No acute events overnight    SUBJECTIVE: Pt seen and examined at bedside this morning. Pt states he feels well this morning. Denies any pain in his foot. Denies fevers, n/v, CP, SOB, abd pain.     ADDITIONAL REVIEW OF SYSTEMS:    MEDICATIONS  (STANDING):  aspirin enteric coated 81 milliGRAM(s) Oral daily  chlorhexidine 4% Liquid 1 Application(s) Topical <User Schedule>  dextrose 5%. 1000 milliLiter(s) (50 mL/Hr) IV Continuous <Continuous>  dextrose 50% Injectable 12.5 Gram(s) IV Push once  dextrose 50% Injectable 25 Gram(s) IV Push once  enoxaparin Injectable 40 milliGRAM(s) SubCutaneous daily  insulin lispro (HumaLOG) corrective regimen sliding scale   SubCutaneous three times a day before meals  insulin lispro (HumaLOG) corrective regimen sliding scale   SubCutaneous at bedtime  metoprolol succinate  milliGRAM(s) Oral daily  piperacillin/tazobactam IVPB.. 3.375 Gram(s) IV Intermittent every 8 hours  vancomycin  IVPB 1250 milliGRAM(s) IV Intermittent every 12 hours    MEDICATIONS  (PRN):  acetaminophen   Tablet .. 650 milliGRAM(s) Oral every 6 hours PRN Mild Pain (1 - 3)  dextrose 40% Gel 15 Gram(s) Oral once PRN Blood Glucose LESS THAN 70 milliGRAM(s)/deciliter  glucagon  Injectable 1 milliGRAM(s) IntraMuscular once PRN Glucose LESS THAN 70 milligrams/deciliter  oxyCODONE    IR 5 milliGRAM(s) Oral every 4 hours PRN Moderate Pain (4 - 6)  sodium chloride 0.9% lock flush 10 milliLiter(s) IV Push every 1 hour PRN Pre/post blood products, medications, blood draw, and to maintain line patency    CAPILLARY BLOOD GLUCOSE    POCT Blood Glucose.: 152 mg/dL (17 Nov 2019 22:11)  POCT Blood Glucose.: 137 mg/dL (17 Nov 2019 17:24)  POCT Blood Glucose.: 131 mg/dL (17 Nov 2019 13:07)  POCT Blood Glucose.: 155 mg/dL (17 Nov 2019 09:03)    I&O's Summary    16 Nov 2019 07:01  -  17 Nov 2019 07:00  --------------------------------------------------------  IN: 1290 mL / OUT: 1200 mL / NET: 90 mL    17 Nov 2019 07:01  -  18 Nov 2019 06:08  --------------------------------------------------------  IN: 930 mL / OUT: 1250 mL / NET: -320 mL    PHYSICAL EXAM:  Vital Signs Last 24 Hrs  T(C): 36.5 (18 Nov 2019 04:43), Max: 36.7 (17 Nov 2019 21:22)  T(F): 97.7 (18 Nov 2019 04:43), Max: 98 (17 Nov 2019 21:22)  HR: 62 (18 Nov 2019 04:43) (62 - 77)  BP: 133/77 (18 Nov 2019 04:43) (132/71 - 135/70)  BP(mean): --  RR: 18 (18 Nov 2019 04:43) (18 - 19)  SpO2: 98% (18 Nov 2019 04:43) (97% - 99%)    GENERAL: NAD, well-developed  HEENT: NC/AT, EOMI  NECK: Supple, No JVD, no carotid bruits b/l  CHEST/LUNG: Clear to auscultation bilaterally; No rales, rhonchi, wheezing, or rubs  CARD: Regular rate and rhythm; No murmurs, rubs, or gallops. No LE edema  ABDOMEN: Soft, Nontender, Nondistended; Bowel sounds present  EXTREMITIES:  2+ Peripheral Pulses, Right foot in dressing after podiatric procedure.  NEURO: AOx3, moving all extremities    LABS:                        12.2   6.01  )-----------( 161      ( 17 Nov 2019 07:10 )             36.3     11-17    141  |  102  |  17  ----------------------------<  139<H>  3.9   |  27  |  0.90    Ca    9.1      17 Nov 2019 07:10  Phos  2.8     11-17  Mg     2.1     11-17    RADIOLOGY & ADDITIONAL TESTS:  Results Reviewed:   < from: Xray Foot AP + Lateral + Oblique, Right (11.14.19 @ 17:59) >    IMPRESSION:     Status post amputation of the second ray to the level of the metatarsal   neck. Status post amputation of the distal aspect of the third metatarsal   and possibly of a portion of the base of the third proximal phalanx,   however, evaluation is limited by overlying antibiotic radiodense   material. No other interval change.    < end of copied text >    Imaging Personally Reviewed:  Electrocardiogram Personally Reviewed:    COORDINATION OF CARE:  Care Discussed with Consultants/Other Providers [Y/N]:  Prior or Outpatient Records Reviewed [Y/N]:

## 2019-11-18 NOTE — PROGRESS NOTE ADULT - ASSESSMENT
74 m with DM, HTN, CAD s/p CABG, previous L 2nd toe amp, developed R toe swelling and pain 9/2019 s/p partial amputation but never improved and for the last week had worsening edema and pain, an outside xray was s/o osteo, he was started on clinda and when the MRI showed osteo and pt did not improve, came to ER. He denied fever, chills, diarrhea   here afebrile, WBC normal, ESR: 28, CRP: 0.62  xray: Bony destruction and lucency at the base of the proximal phalanges of the second and third digits as well as the distal aspects of the second and third metatarsals consistent with osteomyelitis.    diabetic foot infection, R foot cellulitis and osteomyelitis (had partial 2nd toe amp 9/2019 with worsening R foot edema, erythema and pain)  blood cx negative, OR cx: GVR, clean bone negative for now    * c/w vanco 1250 q 12 and check the trough tomorrow  * c/w zosyn 3.375 q 8 for now, will adjust as per the cultures  * f/u the OR cultures, for now GVR  * high suspicion for residual infection, s/p PICC for a 6 week course  * discharge regimen based on cultures

## 2019-11-18 NOTE — PROGRESS NOTE ADULT - PROBLEM SELECTOR PLAN 6
DVT: c/w Lovenox 40mg daily  Diet: CC  Dispo: likely home early this week pending OR cultures/biopsy with IV antibiotics

## 2019-11-18 NOTE — PROGRESS NOTE ADULT - SUBJECTIVE AND OBJECTIVE BOX
Follow Up:  diabetic foot infection and osteo    Interval History: pt stable and afebrile, s/p 2nd ray and partial 3rd MTP resection, OR cultures with GVR and clean bone negative for now    ROS:      All other systems negative    Constitutional: no fever, no chills  Head: no trauma  Eyes: no vision changes, no eye pain  ENT:  no sore throat, no rhinorrhea  Cardiovascular:  no chest pain, no palpitation  Respiratory:  no SOB, no cough  GI:  no abd pain, no vomiting, no diarrhea  urinary: no dysuria, no hematuria, no flank pain  musculoskeletal:  improved R foot pain   skin:  no rash  neurology:  no headache, no seizure  psych: no anxiety, no depression       Allergies  No Known Allergies        ANTIMICROBIALS:  piperacillin/tazobactam IVPB.. 3.375 every 8 hours  vancomycin  IVPB 1250 every 12 hours      OTHER MEDS:  acetaminophen   Tablet .. 650 milliGRAM(s) Oral every 6 hours PRN  aspirin enteric coated 81 milliGRAM(s) Oral daily  chlorhexidine 4% Liquid 1 Application(s) Topical <User Schedule>  dextrose 40% Gel 15 Gram(s) Oral once PRN  dextrose 5%. 1000 milliLiter(s) IV Continuous <Continuous>  dextrose 50% Injectable 12.5 Gram(s) IV Push once  dextrose 50% Injectable 25 Gram(s) IV Push once  enoxaparin Injectable 40 milliGRAM(s) SubCutaneous daily  glucagon  Injectable 1 milliGRAM(s) IntraMuscular once PRN  insulin lispro (HumaLOG) corrective regimen sliding scale   SubCutaneous three times a day before meals  insulin lispro (HumaLOG) corrective regimen sliding scale   SubCutaneous at bedtime  metoprolol succinate  milliGRAM(s) Oral daily  oxyCODONE    IR 5 milliGRAM(s) Oral every 4 hours PRN  sodium chloride 0.9% lock flush 10 milliLiter(s) IV Push every 1 hour PRN      Vital Signs Last 24 Hrs  T(C): 36.5 (18 Nov 2019 04:43), Max: 36.7 (17 Nov 2019 21:22)  T(F): 97.7 (18 Nov 2019 04:43), Max: 98 (17 Nov 2019 21:22)  HR: 62 (18 Nov 2019 04:43) (62 - 77)  BP: 133/77 (18 Nov 2019 04:43) (132/71 - 135/70)  BP(mean): --  RR: 18 (18 Nov 2019 04:43) (18 - 19)  SpO2: 98% (18 Nov 2019 04:43) (97% - 99%)    Physical Exam:  General:    NAD, non toxic  Head: atraumatic, normocephalic  Eyes: normal sclera and conjunctiva  ENT:   no oropharyngeal lesions, no LAD, neck supple  Cardio:   sternotomy scar, regular S1,S2, no murmur  Respiratory:   clear b/l, no wheezing  abd:   soft, BS +, not tender, no hepatosplenomegaly  :     no CVAT, no suprapubic tenderness, no eduardo  Musculoskeletal : s/p L 2nd partial toe amp, now s/p R 2n ray resection and 3rd MTPJ with dressing  Skin:    no rash  vascular: RUE PICC  Neurologic:     no focal deficits  psych: normal affect                        12.2   6.01  )-----------( 161      ( 17 Nov 2019 07:10 )             36.3       11-17    141  |  102  |  17  ----------------------------<  139<H>  3.9   |  27  |  0.90    Ca    9.1      17 Nov 2019 07:10  Phos  2.8     11-17  Mg     2.1     11-17            MICROBIOLOGY:  Vancomycin Level, Trough: 11.0 ug/mL (11-17-19 @ 22:40)  v  .Surgical Swab deep O.R. culture right foot  11-15-19   Growth in fluid media only Coag Negative Staphylococcus  --  --      .Tissue Other  11-15-19   No growth  --    Rare polymorphonuclear leukocytes seen per low power field  No organisms seen per oil power field      .Tissue Other  11-15-19   Growth in fluid media only Gram Variable Rods  --    No polymorphonuclear leukocytes seen per low power field  No organisms seen per oil power field      .Tissue Other  11-15-19   Growth in fluid media only Gram Variable Rods  --    No polymorphonuclear leukocytes seen per low power field  No organisms seen per oil power field      .Blood Blood-Peripheral  11-12-19   No growth at 5 days.  --  --      .Urine Clean Catch (Midstream)  11-12-19   No growth  --  --                RADIOLOGY:  Images below reviewed personally  < from: Xray Chest 1 View- PORTABLE-Urgent (11.16.19 @ 14:10) >    IMPRESSION: Right upper extremity PICC line in good position.    < from: Xray Foot AP + Lateral + Oblique, Right (11.14.19 @ 17:59) >  IMPRESSION:     Status post amputation of the second ray to the level of the metatarsal   neck. Status post amputation of the distal aspect of the third metatarsal   and possibly of a portion of the base of the third proximal phalanx,   however, evaluation is limited by overlying antibiotic radiodense   material. No other interval change.

## 2019-11-18 NOTE — PROGRESS NOTE ADULT - ASSESSMENT
75 y/o M w/ hx of HTN, T2DM, CAD s/p CABG (2007), with previous left 2nd toe amputation and right 2nd toe amputation now presenting for worsening right foot pain with recent outpatient MRI showing R 2nd digit OM and foot cellulitis now s/p short course of clindamycin with no improvement, s/p podiatric resection with high concern for residual infection s/p PICC line for IV antibiotic at home 75 y/o M w/ hx of HTN, T2DM, CAD s/p CABG (2007), with previous left 2nd toe amputation and right 2nd toe amputation now presenting for worsening right foot pain with recent outpatient MRI showing R 2nd digit OM and foot cellulitis now s/p short course of clindamycin with no improvement, s/p podiatric resection with high concern for residual infection s/p PICC line for IV antibiotic at home.

## 2019-11-18 NOTE — PROGRESS NOTE ADULT - PROBLEM SELECTOR PLAN 1
outpatient MRI with signs of 2nd R toe osteo, +ESR/CRP   - s/p resection of right 2nd metatarsal and 3rd metatarsophalangeal joint on 11/14, cultures sent NGTD, pending final path results but high concern for residual infection   - last vanc level 9.3, vanc increased to 1.25 gram q12, monitor vanc trough,  - c/w zosyn 3.375gram q8  -s/p PICC line 11/16, CXR confirmed  -per ID and podiatry f/u OR cultures/biospy to dictate home IV antibiotics  -afebrile, no leukocytosis, HD stable  -pain control with tylenol and oxy prn outpatient MRI with signs of 2nd R toe osteo, +ESR/CRP   - s/p resection of right 2nd metatarsal and 3rd metatarsophalangeal joint on 11/14, cultures sent, NGTD, pending final path results but high concern for residual infection   - c/w vanc 1.25g q12h monitor vanc trough,  - c/w zosyn 3.375gram q8  - s/p PICC line 11/16, CXR confirmed  - per ID and podiatry f/u OR cultures/biopsy to dictate home IV antibiotics, so far only showing coag neg staph  - afebrile, no leukocytosis, HD stable  - pain control with tylenol and oxy prn

## 2019-11-19 LAB
-  AMIKACIN: SIGNIFICANT CHANGE UP
-  AMIKACIN: SIGNIFICANT CHANGE UP
-  AMPICILLIN/SULBACTAM: SIGNIFICANT CHANGE UP
-  AZTREONAM: SIGNIFICANT CHANGE UP
-  AZTREONAM: SIGNIFICANT CHANGE UP
-  CEFAZOLIN: SIGNIFICANT CHANGE UP
-  CEFEPIME: SIGNIFICANT CHANGE UP
-  CEFEPIME: SIGNIFICANT CHANGE UP
-  CEFTAZIDIME: SIGNIFICANT CHANGE UP
-  CEFTAZIDIME: SIGNIFICANT CHANGE UP
-  CIPROFLOXACIN: SIGNIFICANT CHANGE UP
-  CIPROFLOXACIN: SIGNIFICANT CHANGE UP
-  CLINDAMYCIN: SIGNIFICANT CHANGE UP
-  ERYTHROMYCIN: SIGNIFICANT CHANGE UP
-  GENTAMICIN: SIGNIFICANT CHANGE UP
-  IMIPENEM: SIGNIFICANT CHANGE UP
-  IMIPENEM: SIGNIFICANT CHANGE UP
-  LEVOFLOXACIN: SIGNIFICANT CHANGE UP
-  LEVOFLOXACIN: SIGNIFICANT CHANGE UP
-  MEROPENEM: SIGNIFICANT CHANGE UP
-  MEROPENEM: SIGNIFICANT CHANGE UP
-  OXACILLIN: SIGNIFICANT CHANGE UP
-  PENICILLIN: SIGNIFICANT CHANGE UP
-  PIPERACILLIN/TAZOBACTAM: SIGNIFICANT CHANGE UP
-  PIPERACILLIN/TAZOBACTAM: SIGNIFICANT CHANGE UP
-  RIFAMPIN: SIGNIFICANT CHANGE UP
-  TETRACYCLINE: SIGNIFICANT CHANGE UP
-  TOBRAMYCIN: SIGNIFICANT CHANGE UP
-  TOBRAMYCIN: SIGNIFICANT CHANGE UP
-  TRIMETHOPRIM/SULFAMETHOXAZOLE: SIGNIFICANT CHANGE UP
-  VANCOMYCIN: SIGNIFICANT CHANGE UP
CULTURE RESULTS: SIGNIFICANT CHANGE UP
METHOD TYPE: SIGNIFICANT CHANGE UP
ORGANISM # SPEC MICROSCOPIC CNT: SIGNIFICANT CHANGE UP
SPECIMEN SOURCE: SIGNIFICANT CHANGE UP
VANCOMYCIN TROUGH SERPL-MCNC: 16.1 UG/ML — SIGNIFICANT CHANGE UP (ref 10–20)

## 2019-11-19 PROCEDURE — 99232 SBSQ HOSP IP/OBS MODERATE 35: CPT | Mod: GC

## 2019-11-19 PROCEDURE — 99233 SBSQ HOSP IP/OBS HIGH 50: CPT

## 2019-11-19 RX ORDER — CEFEPIME 1 G/1
INJECTION, POWDER, FOR SOLUTION INTRAMUSCULAR; INTRAVENOUS
Refills: 0 | Status: DISCONTINUED | OUTPATIENT
Start: 2019-11-19 | End: 2019-11-20

## 2019-11-19 RX ORDER — CEFEPIME 1 G/1
2 INJECTION, POWDER, FOR SOLUTION INTRAMUSCULAR; INTRAVENOUS
Qty: 2 | Refills: 0
Start: 2019-11-19

## 2019-11-19 RX ORDER — CEFEPIME 1 G/1
2000 INJECTION, POWDER, FOR SOLUTION INTRAMUSCULAR; INTRAVENOUS ONCE
Refills: 0 | Status: COMPLETED | OUTPATIENT
Start: 2019-11-19 | End: 2019-11-19

## 2019-11-19 RX ORDER — CEFEPIME 1 G/1
2000 INJECTION, POWDER, FOR SOLUTION INTRAMUSCULAR; INTRAVENOUS EVERY 12 HOURS
Refills: 0 | Status: DISCONTINUED | OUTPATIENT
Start: 2019-11-19 | End: 2019-11-20

## 2019-11-19 RX ADMIN — ENOXAPARIN SODIUM 40 MILLIGRAM(S): 100 INJECTION SUBCUTANEOUS at 11:57

## 2019-11-19 RX ADMIN — CEFEPIME 100 MILLIGRAM(S): 1 INJECTION, POWDER, FOR SOLUTION INTRAMUSCULAR; INTRAVENOUS at 13:27

## 2019-11-19 RX ADMIN — Medication 1: at 13:29

## 2019-11-19 RX ADMIN — Medication 81 MILLIGRAM(S): at 11:57

## 2019-11-19 RX ADMIN — OXYCODONE HYDROCHLORIDE 5 MILLIGRAM(S): 5 TABLET ORAL at 18:10

## 2019-11-19 RX ADMIN — CHLORHEXIDINE GLUCONATE 1 APPLICATION(S): 213 SOLUTION TOPICAL at 05:43

## 2019-11-19 RX ADMIN — PIPERACILLIN AND TAZOBACTAM 25 GRAM(S): 4; .5 INJECTION, POWDER, LYOPHILIZED, FOR SOLUTION INTRAVENOUS at 00:03

## 2019-11-19 RX ADMIN — PIPERACILLIN AND TAZOBACTAM 25 GRAM(S): 4; .5 INJECTION, POWDER, LYOPHILIZED, FOR SOLUTION INTRAVENOUS at 06:59

## 2019-11-19 RX ADMIN — OXYCODONE HYDROCHLORIDE 5 MILLIGRAM(S): 5 TABLET ORAL at 18:40

## 2019-11-19 RX ADMIN — Medication 100 MILLIGRAM(S): at 05:43

## 2019-11-19 NOTE — PROGRESS NOTE ADULT - ASSESSMENT
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

## 2019-11-19 NOTE — PROGRESS NOTE ADULT - SUBJECTIVE AND OBJECTIVE BOX
PROGRESS NOTE:   Authored By: Renaldo Guerrero MD   Pager: [phone], [phone]    Patient is a 74y old  Male who presents with a chief complaint of Persistent RIGHT forefoot pain, swelling for the past 6 days but worsening since 9/28/19 (18 Nov 2019 12:10)    OVERNIGHT EVENTS: No acute events overnight    SUBJECTIVE: Pt seen and examined at bedside this morning.     ADDITIONAL REVIEW OF SYSTEMS:    MEDICATIONS  (STANDING):  aspirin enteric coated 81 milliGRAM(s) Oral daily  chlorhexidine 4% Liquid 1 Application(s) Topical <User Schedule>  dextrose 5%. 1000 milliLiter(s) (50 mL/Hr) IV Continuous <Continuous>  dextrose 50% Injectable 12.5 Gram(s) IV Push once  dextrose 50% Injectable 25 Gram(s) IV Push once  enoxaparin Injectable 40 milliGRAM(s) SubCutaneous daily  insulin lispro (HumaLOG) corrective regimen sliding scale   SubCutaneous three times a day before meals  insulin lispro (HumaLOG) corrective regimen sliding scale   SubCutaneous at bedtime  metoprolol succinate  milliGRAM(s) Oral daily  piperacillin/tazobactam IVPB.. 3.375 Gram(s) IV Intermittent every 8 hours  vancomycin  IVPB 1250 milliGRAM(s) IV Intermittent every 12 hours    MEDICATIONS  (PRN):  acetaminophen   Tablet .. 650 milliGRAM(s) Oral every 6 hours PRN Mild Pain (1 - 3)  dextrose 40% Gel 15 Gram(s) Oral once PRN Blood Glucose LESS THAN 70 milliGRAM(s)/deciliter  glucagon  Injectable 1 milliGRAM(s) IntraMuscular once PRN Glucose LESS THAN 70 milligrams/deciliter  oxyCODONE    IR 5 milliGRAM(s) Oral every 4 hours PRN Moderate Pain (4 - 6)  sodium chloride 0.9% lock flush 10 milliLiter(s) IV Push every 1 hour PRN Pre/post blood products, medications, blood draw, and to maintain line patency    CAPILLARY BLOOD GLUCOSE    POCT Blood Glucose.: 145 mg/dL (18 Nov 2019 22:22)  POCT Blood Glucose.: 146 mg/dL (18 Nov 2019 18:11)  POCT Blood Glucose.: 174 mg/dL (18 Nov 2019 13:29)    I&O's Summary    18 Nov 2019 07:01  -  19 Nov 2019 07:00  --------------------------------------------------------  IN: 2260 mL / OUT: 1100 mL / NET: 1160 mL    PHYSICAL EXAM:  Vital Signs Last 24 Hrs  T(C): 36.7 (19 Nov 2019 05:03), Max: 36.7 (19 Nov 2019 05:03)  T(F): 98 (19 Nov 2019 05:03), Max: 98 (19 Nov 2019 05:03)  HR: 62 (19 Nov 2019 05:03) (62 - 68)  BP: 144/74 (19 Nov 2019 05:03) (129/67 - 147/76)  BP(mean): --  RR: 18 (19 Nov 2019 05:03) (18 - 18)  SpO2: 96% (19 Nov 2019 05:03) (96% - 98%)    GENERAL: NAD, well-developed  HEENT: NC/AT, EOMI  NECK: Supple, No JVD, no carotid bruits b/l  CHEST/LUNG: Clear to auscultation bilaterally; No rales, rhonchi, wheezing, or rubs  CARD: Regular rate and rhythm; No murmurs, rubs, or gallops. No LE edema  ABDOMEN: Soft, Nontender, Nondistended; Bowel sounds present  EXTREMITIES:  2+ Peripheral Pulses, Right foot in dressing after podiatric procedure.  NEURO: AOx3, moving all extremities    LABS:    RADIOLOGY & ADDITIONAL TESTS:  Results Reviewed:   Status post amputation of the second ray to the level of the metatarsal   neck. Status post amputation of the distal aspect of the third metatarsal   and possibly of a portion of the base of the third proximal phalanx,   however, evaluation is limited by overlying antibiotic radiodense   material. No other interval change.    < end of copied text >  Imaging Personally Reviewed:  Electrocardiogram Personally Reviewed:    COORDINATION OF CARE:  Care Discussed with Consultants/Other Providers [Y/N]:  Prior or Outpatient Records Reviewed [Y/N]: PROGRESS NOTE:   Authored By: Renaldo Guerrero MD   Pager: [phone], [phone]    Patient is a 74y old  Male who presents with a chief complaint of Persistent RIGHT forefoot pain, swelling for the past 6 days but worsening since 9/28/19 (18 Nov 2019 12:10)    OVERNIGHT EVENTS: No acute events overnight.    SUBJECTIVE: Pt seen and examined at bedside this morning. Denies any complaints today. State he feels well, and denies any pain in his foot. Denies fevers, CP, SOB, fever.    ADDITIONAL REVIEW OF SYSTEMS:    MEDICATIONS  (STANDING):  aspirin enteric coated 81 milliGRAM(s) Oral daily  chlorhexidine 4% Liquid 1 Application(s) Topical <User Schedule>  dextrose 5%. 1000 milliLiter(s) (50 mL/Hr) IV Continuous <Continuous>  dextrose 50% Injectable 12.5 Gram(s) IV Push once  dextrose 50% Injectable 25 Gram(s) IV Push once  enoxaparin Injectable 40 milliGRAM(s) SubCutaneous daily  insulin lispro (HumaLOG) corrective regimen sliding scale   SubCutaneous three times a day before meals  insulin lispro (HumaLOG) corrective regimen sliding scale   SubCutaneous at bedtime  metoprolol succinate  milliGRAM(s) Oral daily  piperacillin/tazobactam IVPB.. 3.375 Gram(s) IV Intermittent every 8 hours  vancomycin  IVPB 1250 milliGRAM(s) IV Intermittent every 12 hours    MEDICATIONS  (PRN):  acetaminophen   Tablet .. 650 milliGRAM(s) Oral every 6 hours PRN Mild Pain (1 - 3)  dextrose 40% Gel 15 Gram(s) Oral once PRN Blood Glucose LESS THAN 70 milliGRAM(s)/deciliter  glucagon  Injectable 1 milliGRAM(s) IntraMuscular once PRN Glucose LESS THAN 70 milligrams/deciliter  oxyCODONE    IR 5 milliGRAM(s) Oral every 4 hours PRN Moderate Pain (4 - 6)  sodium chloride 0.9% lock flush 10 milliLiter(s) IV Push every 1 hour PRN Pre/post blood products, medications, blood draw, and to maintain line patency    CAPILLARY BLOOD GLUCOSE    POCT Blood Glucose.: 145 mg/dL (18 Nov 2019 22:22)  POCT Blood Glucose.: 146 mg/dL (18 Nov 2019 18:11)  POCT Blood Glucose.: 174 mg/dL (18 Nov 2019 13:29)    I&O's Summary    18 Nov 2019 07:01  -  19 Nov 2019 07:00  --------------------------------------------------------  IN: 2260 mL / OUT: 1100 mL / NET: 1160 mL    PHYSICAL EXAM:  Vital Signs Last 24 Hrs  T(C): 36.7 (19 Nov 2019 05:03), Max: 36.7 (19 Nov 2019 05:03)  T(F): 98 (19 Nov 2019 05:03), Max: 98 (19 Nov 2019 05:03)  HR: 62 (19 Nov 2019 05:03) (62 - 68)  BP: 144/74 (19 Nov 2019 05:03) (129/67 - 147/76)  BP(mean): --  RR: 18 (19 Nov 2019 05:03) (18 - 18)  SpO2: 96% (19 Nov 2019 05:03) (96% - 98%)    GENERAL: NAD, well-developed  HEENT: NC/AT, EOMI  NECK: Supple, No JVD, no carotid bruits b/l  CHEST/LUNG: Clear to auscultation bilaterally; No rales, rhonchi, wheezing, or rubs  CARD: Regular rate and rhythm; No murmurs, rubs, or gallops. No LE edema  ABDOMEN: Soft, Nontender, Nondistended; Bowel sounds present  EXTREMITIES:  2+ Peripheral Pulses, Right foot in dressing after podiatric procedure.  NEURO: AOx3, moving all extremities    LABS:    RADIOLOGY & ADDITIONAL TESTS:  Results Reviewed:   Status post amputation of the second ray to the level of the metatarsal   neck. Status post amputation of the distal aspect of the third metatarsal   and possibly of a portion of the base of the third proximal phalanx,   however, evaluation is limited by overlying antibiotic radiodense   material. No other interval change.    < end of copied text >  Imaging Personally Reviewed:  Electrocardiogram Personally Reviewed:    COORDINATION OF CARE:  Care Discussed with Consultants/Other Providers [Y/N]:  Prior or Outpatient Records Reviewed [Y/N]:

## 2019-11-19 NOTE — PROGRESS NOTE ADULT - PROBLEM SELECTOR PLAN 6
DVT: c/w Lovenox 40mg daily  Diet: CC  Dispo: likely home early this week pending OR cultures/biopsy with IV antibiotics DVT: c/w Lovenox 40mg daily  Diet: CC  Dispo: likely home pending OR cultures/biopsy with IV antibiotics

## 2019-11-19 NOTE — PROGRESS NOTE ADULT - PROBLEM SELECTOR PLAN 7
Transitions of Care Status:  1.  Name of PCP:  2.  PCP Contacted on Admission: [ ] Y    [ ] N    3.  PCP contacted at Discharge: [ ] Y    [ ] N    [ ] N/A  4.  Post-Discharge Appointment Date and Location:  5.  Summary of Handoff given to PCP: Transitions of Care Status:  1.  Name of PCP: Chirag Zuniga MD  - [phone]  2.  PCP Contacted on Admission: [  ] Y    [ x] N  office was contacted on 11/19 @2019  3.  PCP contacted at Discharge: [ ] Y    [ ] N    [ ] N/A  4.  Post-Discharge Appointment Date and Location:  5.  Summary of Handoff given to PCP:

## 2019-11-19 NOTE — PROGRESS NOTE ADULT - PROBLEM SELECTOR PLAN 1
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

## 2019-11-19 NOTE — PROGRESS NOTE ADULT - ASSESSMENT
74 m with DM, HTN, CAD s/p CABG, previous L 2nd toe amp, developed R toe swelling and pain 9/2019 s/p partial amputation but never improved and for the last week had worsening edema and pain, an outside xray was s/o osteo, he was started on clinda and when the MRI showed osteo and pt did not improve, came to ER. He denied fever, chills, diarrhea   here afebrile, WBC normal, ESR: 28, CRP: 0.62  xray: Bony destruction and lucency at the base of the proximal phalanges of the second and third digits as well as the distal aspects of the second and third metatarsals consistent with osteomyelitis.    diabetic foot infection, R foot cellulitis and osteomyelitis (had partial 2nd toe amp 9/2019 with worsening R foot edema, erythema and pain)  s/p 2nd ray resection and 3rd MTPJ arthroplasty 11/14  blood cx negative, OR cx: pseudomonas  clean bone negative for now    * DC vanco and zosyn  * start cefepime 2 q 12, antibiotics started 11/12, now day 8  * f/u the pseudomonas sensitivities   * f/u the path  * high suspicion for residual infection, s/p PICC for a 6 week course until 12/26  * discharge regimen based on cultures

## 2019-11-19 NOTE — PROGRESS NOTE ADULT - SUBJECTIVE AND OBJECTIVE BOX
Follow Up:  diabetic foot infection and osteo    Interval History: pt stable and afebrile, s/p 2nd ray and partial 3rd MTP resection, OR cultures with pseudomonas and clean bone negative for now    ROS:      All other systems negative    Constitutional: no fever, no chills  Head: no trauma  Eyes: no vision changes, no eye pain  ENT:  no sore throat, no rhinorrhea  Cardiovascular:  no chest pain, no palpitation  Respiratory:  no SOB, no cough  GI:  no abd pain, no vomiting, no diarrhea  urinary: no dysuria, no hematuria, no flank pain  musculoskeletal:  resolved R foot pain   skin:  no rash  neurology:  no headache, no seizure  psych: no anxiety, no depression         Allergies  No Known Allergies        ANTIMICROBIALS:  cefepime   IVPB    cefepime   IVPB 2000 once  cefepime   IVPB 2000 every 12 hours      OTHER MEDS:  acetaminophen   Tablet .. 650 milliGRAM(s) Oral every 6 hours PRN  aspirin enteric coated 81 milliGRAM(s) Oral daily  chlorhexidine 4% Liquid 1 Application(s) Topical <User Schedule>  dextrose 40% Gel 15 Gram(s) Oral once PRN  dextrose 5%. 1000 milliLiter(s) IV Continuous <Continuous>  dextrose 50% Injectable 12.5 Gram(s) IV Push once  dextrose 50% Injectable 25 Gram(s) IV Push once  enoxaparin Injectable 40 milliGRAM(s) SubCutaneous daily  glucagon  Injectable 1 milliGRAM(s) IntraMuscular once PRN  insulin lispro (HumaLOG) corrective regimen sliding scale   SubCutaneous three times a day before meals  insulin lispro (HumaLOG) corrective regimen sliding scale   SubCutaneous at bedtime  metoprolol succinate  milliGRAM(s) Oral daily  oxyCODONE    IR 5 milliGRAM(s) Oral every 4 hours PRN  sodium chloride 0.9% lock flush 10 milliLiter(s) IV Push every 1 hour PRN      Vital Signs Last 24 Hrs  T(C): 36.7 (19 Nov 2019 05:03), Max: 36.7 (19 Nov 2019 05:03)  T(F): 98 (19 Nov 2019 05:03), Max: 98 (19 Nov 2019 05:03)  HR: 62 (19 Nov 2019 05:03) (62 - 68)  BP: 144/74 (19 Nov 2019 05:03) (129/67 - 147/76)  BP(mean): --  RR: 18 (19 Nov 2019 05:03) (18 - 18)  SpO2: 96% (19 Nov 2019 05:03) (96% - 98%)    Physical Exam:  General:    NAD, non toxic  Head: atraumatic, normocephalic  Eyes: normal sclera and conjunctiva  ENT:   no oropharyngeal lesions, no LAD, neck supple  Cardio:   sternotomy scar, regular S1,S2, no murmur  Respiratory:   clear b/l, no wheezing  abd:   soft, BS +, not tender, no hepatosplenomegaly  :     no CVAT, no suprapubic tenderness, no eduardo  Musculoskeletal : s/p L 2nd partial toe amp, now s/p R 2n ray resection and 3rd MTPJ with dressing  Skin:    no rash  vascular: RUE PICC  Neurologic:     no focal deficits  psych: normal affect                MICROBIOLOGY:  Vancomycin Level, Trough: 16.1 ug/mL (11-19-19 @ 08:48)  v  .Surgical Swab deep O.R. culture right foot  11-15-19   Growth in fluid media only Coag Negative Staphylococcus  --  --      .Tissue Other  11-15-19   No growth  --    Rare polymorphonuclear leukocytes seen per low power field  No organisms seen per oil power field      .Tissue Other  11-15-19   Growth in fluid media only Pseudomonas aeruginosa  --    No polymorphonuclear leukocytes seen per low power field  No organisms seen per oil power field      .Tissue Other  11-15-19   Growth in fluid media only Pseudomonas aeruginosa  --    No polymorphonuclear leukocytes seen per low power field  No organisms seen per oil power field      .Blood Blood-Peripheral  11-12-19   No growth at 5 days.  --  --      .Urine Clean Catch (Midstream)  11-12-19   No growth  --  --                RADIOLOGY:  Images below reviewed personally  < from: Xray Chest 1 View- PORTABLE-Urgent (11.16.19 @ 14:10) >  COMPARISON: 11/12/2019    FINDINGS:  The cardiac silhouette is normal in size. The patient is   status post median sternotomy. There is a right upper extremity PICC line   with its tip overlying the superior vena cava. No pneumothorax is seen.   There are no focal consolidations or pleuraleffusions. The hilar and   mediastinal structures appear unremarkable. The osseous structures are   intact.    IMPRESSION: Right upper extremity PICC line in good position.        < from: Xray Foot AP + Lateral + Oblique, Right (11.14.19 @ 17:59) >    IMPRESSION:     Status post amputation of the second ray to the level of the metatarsal   neck. Status post amputation of the distal aspect of the third metatarsal   and possibly of a portion of the base of the third proximal phalanx,   however, evaluation is limited by overlying antibiotic radiodense   material. No other interval change.

## 2019-11-20 ENCOUNTER — TRANSCRIPTION ENCOUNTER (OUTPATIENT)
Age: 74
End: 2019-11-20

## 2019-11-20 VITALS
TEMPERATURE: 98 F | RESPIRATION RATE: 18 BRPM | DIASTOLIC BLOOD PRESSURE: 65 MMHG | SYSTOLIC BLOOD PRESSURE: 131 MMHG | OXYGEN SATURATION: 99 %

## 2019-11-20 LAB
CULTURE RESULTS: SIGNIFICANT CHANGE UP
ORGANISM # SPEC MICROSCOPIC CNT: SIGNIFICANT CHANGE UP
ORGANISM # SPEC MICROSCOPIC CNT: SIGNIFICANT CHANGE UP
SPECIMEN SOURCE: SIGNIFICANT CHANGE UP

## 2019-11-20 PROCEDURE — 82962 GLUCOSE BLOOD TEST: CPT

## 2019-11-20 PROCEDURE — 96375 TX/PRO/DX INJ NEW DRUG ADDON: CPT

## 2019-11-20 PROCEDURE — 83036 HEMOGLOBIN GLYCOSYLATED A1C: CPT

## 2019-11-20 PROCEDURE — 81001 URINALYSIS AUTO W/SCOPE: CPT

## 2019-11-20 PROCEDURE — 88311 DECALCIFY TISSUE: CPT

## 2019-11-20 PROCEDURE — 86850 RBC ANTIBODY SCREEN: CPT

## 2019-11-20 PROCEDURE — 71045 X-RAY EXAM CHEST 1 VIEW: CPT

## 2019-11-20 PROCEDURE — 80202 ASSAY OF VANCOMYCIN: CPT

## 2019-11-20 PROCEDURE — 86803 HEPATITIS C AB TEST: CPT

## 2019-11-20 PROCEDURE — C1713: CPT

## 2019-11-20 PROCEDURE — 86140 C-REACTIVE PROTEIN: CPT

## 2019-11-20 PROCEDURE — 86901 BLOOD TYPING SEROLOGIC RH(D): CPT

## 2019-11-20 PROCEDURE — 80053 COMPREHEN METABOLIC PANEL: CPT

## 2019-11-20 PROCEDURE — 36569 INSJ PICC 5 YR+ W/O IMAGING: CPT

## 2019-11-20 PROCEDURE — 87186 SC STD MICRODIL/AGAR DIL: CPT

## 2019-11-20 PROCEDURE — 99285 EMERGENCY DEPT VISIT HI MDM: CPT | Mod: 25

## 2019-11-20 PROCEDURE — 99233 SBSQ HOSP IP/OBS HIGH 50: CPT

## 2019-11-20 PROCEDURE — 87070 CULTURE OTHR SPECIMN AEROBIC: CPT

## 2019-11-20 PROCEDURE — 96374 THER/PROPH/DIAG INJ IV PUSH: CPT

## 2019-11-20 PROCEDURE — 85610 PROTHROMBIN TIME: CPT

## 2019-11-20 PROCEDURE — 86900 BLOOD TYPING SEROLOGIC ABO: CPT

## 2019-11-20 PROCEDURE — 85652 RBC SED RATE AUTOMATED: CPT

## 2019-11-20 PROCEDURE — 87086 URINE CULTURE/COLONY COUNT: CPT

## 2019-11-20 PROCEDURE — 73630 X-RAY EXAM OF FOOT: CPT

## 2019-11-20 PROCEDURE — 99239 HOSP IP/OBS DSCHRG MGMT >30: CPT | Mod: GC

## 2019-11-20 PROCEDURE — 87040 BLOOD CULTURE FOR BACTERIA: CPT

## 2019-11-20 PROCEDURE — 93005 ELECTROCARDIOGRAM TRACING: CPT

## 2019-11-20 PROCEDURE — 83735 ASSAY OF MAGNESIUM: CPT

## 2019-11-20 PROCEDURE — 85730 THROMBOPLASTIN TIME PARTIAL: CPT

## 2019-11-20 PROCEDURE — 88304 TISSUE EXAM BY PATHOLOGIST: CPT

## 2019-11-20 PROCEDURE — C1894: CPT

## 2019-11-20 PROCEDURE — 83605 ASSAY OF LACTIC ACID: CPT

## 2019-11-20 PROCEDURE — 84100 ASSAY OF PHOSPHORUS: CPT

## 2019-11-20 PROCEDURE — 85027 COMPLETE CBC AUTOMATED: CPT

## 2019-11-20 PROCEDURE — 80048 BASIC METABOLIC PNL TOTAL CA: CPT

## 2019-11-20 PROCEDURE — 97161 PT EVAL LOW COMPLEX 20 MIN: CPT

## 2019-11-20 RX ORDER — ASPIRIN/CALCIUM CARB/MAGNESIUM 324 MG
1 TABLET ORAL
Qty: 0 | Refills: 0 | DISCHARGE

## 2019-11-20 RX ORDER — METOPROLOL TARTRATE 50 MG
1 TABLET ORAL
Qty: 0 | Refills: 0 | DISCHARGE

## 2019-11-20 RX ADMIN — CEFEPIME 100 MILLIGRAM(S): 1 INJECTION, POWDER, FOR SOLUTION INTRAMUSCULAR; INTRAVENOUS at 07:06

## 2019-11-20 RX ADMIN — CEFEPIME 100 MILLIGRAM(S): 1 INJECTION, POWDER, FOR SOLUTION INTRAMUSCULAR; INTRAVENOUS at 05:36

## 2019-11-20 RX ADMIN — Medication 81 MILLIGRAM(S): at 12:48

## 2019-11-20 RX ADMIN — CHLORHEXIDINE GLUCONATE 1 APPLICATION(S): 213 SOLUTION TOPICAL at 05:36

## 2019-11-20 RX ADMIN — ENOXAPARIN SODIUM 40 MILLIGRAM(S): 100 INJECTION SUBCUTANEOUS at 12:48

## 2019-11-20 RX ADMIN — CEFEPIME 100 MILLIGRAM(S): 1 INJECTION, POWDER, FOR SOLUTION INTRAMUSCULAR; INTRAVENOUS at 15:47

## 2019-11-20 NOTE — PROGRESS NOTE ADULT - ASSESSMENT
74 m with DM, HTN, CAD s/p CABG, previous L 2nd toe amp, developed R toe swelling and pain 9/2019 s/p partial amputation but never improved and for the last week had worsening edema and pain, an outside xray was s/o osteo, he was started on clinda and when the MRI showed osteo and pt did not improve, came to ER. He denied fever, chills, diarrhea   here afebrile, WBC normal, ESR: 28, CRP: 0.62  xray: Bony destruction and lucency at the base of the proximal phalanges of the second and third digits as well as the distal aspects of the second and third metatarsals consistent with osteomyelitis.    diabetic foot infection, R foot cellulitis and osteomyelitis (had partial 2nd toe amp 9/2019 with worsening R foot edema, erythema and pain)  s/p 2nd ray resection and 3rd MTPJ arthroplasty 11/14  blood cx negative, OR cx: pseudomonas (pan S)  clean bone negative for now, deep cx: coag neg staph    * c/w cefepime 2 q 12, antibiotics started 11/12, now day 9  * high suspicion for residual infection, s/p PICC for a 6 week course until 12/26  * weekly CBC, CMP, ESR and CRP  * f/ u with ID and podiatry as outpatient

## 2019-11-20 NOTE — PROGRESS NOTE ADULT - ASSESSMENT
73 yo M s/p right foot partial 2nd ray resection (DOS 11/14/19)  -Pt seen and evaluated, POD #3  -Sutures intact w/ no dehiscence, flap viable, no hematoma and no active bleeding however 3cc sanguinous drianage was expressed w/ compression (likely from antibiotic beads). Site is stable w/ no signs of infection.  -Dressed w/ betadine and sterile dry compressive dressing  -High concern for residual infection  -OR cultures growing pseudomonas, pt already has PICC   -Pod stable for discharge today, d/c on abx per ID  -Keep dressing clean dry and intact until follow up  -Discussed w/ attending

## 2019-11-20 NOTE — PROGRESS NOTE ADULT - SUBJECTIVE AND OBJECTIVE BOX
PROGRESS NOTE:   Authored By: Renaldo Guerrero MD   Pager: [phone], [phone]    Patient is a 74y old  Male who presents with a chief complaint of Persistent RIGHT forefoot pain, swelling for the past 6 days but worsening since 9/28/19 (19 Nov 2019 11:08)    OVERNIGHT EVENTS: No acute events overnight.    SUBJECTIVE: Pt seen and examined at bedside this morning.     ADDITIONAL REVIEW OF SYSTEMS:    MEDICATIONS  (STANDING):  aspirin enteric coated 81 milliGRAM(s) Oral daily  cefepime   IVPB      cefepime   IVPB 2000 milliGRAM(s) IV Intermittent every 12 hours  chlorhexidine 4% Liquid 1 Application(s) Topical <User Schedule>  dextrose 5%. 1000 milliLiter(s) (50 mL/Hr) IV Continuous <Continuous>  dextrose 50% Injectable 12.5 Gram(s) IV Push once  dextrose 50% Injectable 25 Gram(s) IV Push once  enoxaparin Injectable 40 milliGRAM(s) SubCutaneous daily  insulin lispro (HumaLOG) corrective regimen sliding scale   SubCutaneous three times a day before meals  insulin lispro (HumaLOG) corrective regimen sliding scale   SubCutaneous at bedtime  metoprolol succinate  milliGRAM(s) Oral daily    MEDICATIONS  (PRN):  acetaminophen   Tablet .. 650 milliGRAM(s) Oral every 6 hours PRN Mild Pain (1 - 3)  dextrose 40% Gel 15 Gram(s) Oral once PRN Blood Glucose LESS THAN 70 milliGRAM(s)/deciliter  glucagon  Injectable 1 milliGRAM(s) IntraMuscular once PRN Glucose LESS THAN 70 milligrams/deciliter  oxyCODONE    IR 5 milliGRAM(s) Oral every 4 hours PRN Moderate Pain (4 - 6)  sodium chloride 0.9% lock flush 10 milliLiter(s) IV Push every 1 hour PRN Pre/post blood products, medications, blood draw, and to maintain line patency    CAPILLARY BLOOD GLUCOSE    POCT Blood Glucose.: 172 mg/dL (19 Nov 2019 21:00)  POCT Blood Glucose.: 147 mg/dL (19 Nov 2019 17:28)  POCT Blood Glucose.: 188 mg/dL (19 Nov 2019 13:15)  POCT Blood Glucose.: 139 mg/dL (19 Nov 2019 09:12)    I&O's Summary    19 Nov 2019 07:01  -  20 Nov 2019 07:00  --------------------------------------------------------  IN: 880 mL / OUT: 0 mL / NET: 880 mL    PHYSICAL EXAM:  Vital Signs Last 24 Hrs  T(C): 36.6 (20 Nov 2019 04:37), Max: 36.8 (19 Nov 2019 21:07)  T(F): 97.9 (20 Nov 2019 04:37), Max: 98.3 (19 Nov 2019 21:07)  HR: 58 (20 Nov 2019 05:34) (58 - 70)  BP: 132/66 (20 Nov 2019 05:34) (129/63 - 173/73)  BP(mean): --  RR: 18 (20 Nov 2019 04:37) (18 - 18)  SpO2: 97% (20 Nov 2019 04:37) (97% - 98%)    GENERAL: NAD, well-developed  HEENT: NC/AT, EOMI  NECK: Supple, No JVD, no carotid bruits b/l  CHEST/LUNG: Clear to auscultation bilaterally; No rales, rhonchi, wheezing, or rubs  CARD: Regular rate and rhythm; No murmurs, rubs, or gallops. No LE edema  ABDOMEN: Soft, Nontender, Nondistended; Bowel sounds present  EXTREMITIES:  2+ Peripheral Pulses, Right foot in dressing after podiatric procedure.  NEURO: AOx3, moving all extremities    LABS:    RADIOLOGY & ADDITIONAL TESTS:  < from: Xray Foot AP + Lateral + Oblique, Right (11.14.19 @ 17:59) >  IMPRESSION:     Status post amputation of the second ray to the level of the metatarsal   neck. Status post amputation of the distal aspect of the third metatarsal   and possibly of a portion of the base of the third proximal phalanx,   however, evaluation is limited by overlying antibiotic radiodense   material. No other interval change.    < end of copied text >    Results Reviewed:   Imaging Personally Reviewed:  Electrocardiogram Personally Reviewed:    COORDINATION OF CARE:  Care Discussed with Consultants/Other Providers [Y/N]:  Prior or Outpatient Records Reviewed [Y/N]: PROGRESS NOTE:   Authored By: Renaldo Guerrero MD   Pager: [phone], [phone]    Patient is a 74y old  Male who presents with a chief complaint of Persistent RIGHT forefoot pain, swelling for the past 6 days but worsening since 9/28/19 (19 Nov 2019 11:08)    OVERNIGHT EVENTS: No acute events overnight.    SUBJECTIVE: Pt seen and examined at bedside this morning. Pt states he is feeling well. Denies any pain in his foot. Denies any CP, SOB, fever, n/v or abdominal pain.    ADDITIONAL REVIEW OF SYSTEMS:    MEDICATIONS  (STANDING):  aspirin enteric coated 81 milliGRAM(s) Oral daily  cefepime   IVPB      cefepime   IVPB 2000 milliGRAM(s) IV Intermittent every 12 hours  chlorhexidine 4% Liquid 1 Application(s) Topical <User Schedule>  dextrose 5%. 1000 milliLiter(s) (50 mL/Hr) IV Continuous <Continuous>  dextrose 50% Injectable 12.5 Gram(s) IV Push once  dextrose 50% Injectable 25 Gram(s) IV Push once  enoxaparin Injectable 40 milliGRAM(s) SubCutaneous daily  insulin lispro (HumaLOG) corrective regimen sliding scale   SubCutaneous three times a day before meals  insulin lispro (HumaLOG) corrective regimen sliding scale   SubCutaneous at bedtime  metoprolol succinate  milliGRAM(s) Oral daily    MEDICATIONS  (PRN):  acetaminophen   Tablet .. 650 milliGRAM(s) Oral every 6 hours PRN Mild Pain (1 - 3)  dextrose 40% Gel 15 Gram(s) Oral once PRN Blood Glucose LESS THAN 70 milliGRAM(s)/deciliter  glucagon  Injectable 1 milliGRAM(s) IntraMuscular once PRN Glucose LESS THAN 70 milligrams/deciliter  oxyCODONE    IR 5 milliGRAM(s) Oral every 4 hours PRN Moderate Pain (4 - 6)  sodium chloride 0.9% lock flush 10 milliLiter(s) IV Push every 1 hour PRN Pre/post blood products, medications, blood draw, and to maintain line patency    CAPILLARY BLOOD GLUCOSE    POCT Blood Glucose.: 172 mg/dL (19 Nov 2019 21:00)  POCT Blood Glucose.: 147 mg/dL (19 Nov 2019 17:28)  POCT Blood Glucose.: 188 mg/dL (19 Nov 2019 13:15)  POCT Blood Glucose.: 139 mg/dL (19 Nov 2019 09:12)    I&O's Summary    19 Nov 2019 07:01  -  20 Nov 2019 07:00  --------------------------------------------------------  IN: 880 mL / OUT: 0 mL / NET: 880 mL    PHYSICAL EXAM:  Vital Signs Last 24 Hrs  T(C): 36.6 (20 Nov 2019 04:37), Max: 36.8 (19 Nov 2019 21:07)  T(F): 97.9 (20 Nov 2019 04:37), Max: 98.3 (19 Nov 2019 21:07)  HR: 58 (20 Nov 2019 05:34) (58 - 70)  BP: 132/66 (20 Nov 2019 05:34) (129/63 - 173/73)  BP(mean): --  RR: 18 (20 Nov 2019 04:37) (18 - 18)  SpO2: 97% (20 Nov 2019 04:37) (97% - 98%)    GENERAL: NAD, well-developed  HEENT: NC/AT, EOMI  NECK: Supple, No JVD, no carotid bruits b/l  CHEST/LUNG: Clear to auscultation bilaterally; No rales, rhonchi, wheezing, or rubs  CARD: Regular rate and rhythm; No murmurs, rubs, or gallops. No LE edema  ABDOMEN: Soft, Nontender, Nondistended; Bowel sounds present  EXTREMITIES:  2+ Peripheral Pulses, Right foot in dressing, changed this morning by podiatry. C/D/I.  NEURO: AOx3, moving all extremities    LABS:    RADIOLOGY & ADDITIONAL TESTS:  < from: Xray Foot AP + Lateral + Oblique, Right (11.14.19 @ 17:59) >  IMPRESSION:     Status post amputation of the second ray to the level of the metatarsal   neck. Status post amputation of the distal aspect of the third metatarsal   and possibly of a portion of the base of the third proximal phalanx,   however, evaluation is limited by overlying antibiotic radiodense   material. No other interval change.    < end of copied text >    Results Reviewed:   Imaging Personally Reviewed:  Electrocardiogram Personally Reviewed:    COORDINATION OF CARE:  Care Discussed with Consultants/Other Providers [Y/N]:  Prior or Outpatient Records Reviewed [Y/N]:

## 2019-11-20 NOTE — PROGRESS NOTE ADULT - ASSESSMENT
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

## 2019-11-20 NOTE — PROGRESS NOTE ADULT - SUBJECTIVE AND OBJECTIVE BOX
Follow Up:  diabetic foot infection and osteo    Interval History: pt stable and afebrile, s/p 2nd ray and partial 3rd MTP resection, OR cultures with pan S pseudomonas and clean bone negative     ROS:      All other systems negative    Constitutional: no fever, no chills  Head: no trauma  Eyes: no vision changes, no eye pain  ENT:  no sore throat, no rhinorrhea  Cardiovascular:  no chest pain, no palpitation  Respiratory:  no SOB, no cough  GI:  no abd pain, no vomiting, no diarrhea  urinary: no dysuria, no hematuria, no flank pain  musculoskeletal:  resolved R foot pain   skin:  no rash  neurology:  no headache, no seizure  psych: no anxiety, no depression         Allergies  No Known Allergies        ANTIMICROBIALS:  cefepime   IVPB    cefepime   IVPB 2000 every 12 hours      OTHER MEDS:  acetaminophen   Tablet .. 650 milliGRAM(s) Oral every 6 hours PRN  aspirin enteric coated 81 milliGRAM(s) Oral daily  chlorhexidine 4% Liquid 1 Application(s) Topical <User Schedule>  dextrose 40% Gel 15 Gram(s) Oral once PRN  dextrose 5%. 1000 milliLiter(s) IV Continuous <Continuous>  dextrose 50% Injectable 12.5 Gram(s) IV Push once  dextrose 50% Injectable 25 Gram(s) IV Push once  enoxaparin Injectable 40 milliGRAM(s) SubCutaneous daily  glucagon  Injectable 1 milliGRAM(s) IntraMuscular once PRN  insulin lispro (HumaLOG) corrective regimen sliding scale   SubCutaneous three times a day before meals  insulin lispro (HumaLOG) corrective regimen sliding scale   SubCutaneous at bedtime  metoprolol succinate  milliGRAM(s) Oral daily  oxyCODONE    IR 5 milliGRAM(s) Oral every 4 hours PRN  sodium chloride 0.9% lock flush 10 milliLiter(s) IV Push every 1 hour PRN      Vital Signs Last 24 Hrs  T(C): 36.6 (20 Nov 2019 09:20), Max: 36.8 (19 Nov 2019 21:07)  T(F): 97.9 (20 Nov 2019 09:20), Max: 98.3 (19 Nov 2019 21:07)  HR: 69 (20 Nov 2019 09:20) (58 - 70)  BP: 133/67 (20 Nov 2019 09:20) (129/63 - 173/73)  BP(mean): --  RR: 19 (20 Nov 2019 09:20) (18 - 19)  SpO2: 99% (20 Nov 2019 09:20) (97% - 99%)    Physical Exam:  General:    NAD, non toxic  Head: atraumatic, normocephalic  Eyes: normal sclera and conjunctiva  ENT:   no oropharyngeal lesions, no LAD, neck supple  Cardio:   sternotomy scar, regular S1,S2, no murmur  Respiratory:   clear b/l, no wheezing  abd:   soft, BS +, not tender, no hepatosplenomegaly  :     no CVAT, no suprapubic tenderness, no eduardo  Musculoskeletal : s/p L 2nd partial toe amp, now s/p R 2n ray resection and 3rd MTPJ with dressing  Skin:    no rash  vascular: RUE PICC  Neurologic:     no focal deficits  psych: normal affect              MICROBIOLOGY:  v  .Surgical Swab deep O.R. culture right foot  11-15-19   Growth in fluid media only Coag Negative Staphylococcus  --  Coag Negative Staphylococcus      .Tissue Other  11-15-19   No growth at 5 days  --    Rare polymorphonuclear leukocytes seen per low power field  No organisms seen per oil power field      .Tissue Other  11-15-19   Growth in fluid media only Pseudomonas aeruginosa  --  Pseudomonas aeruginosa      .Tissue Other  11-15-19   Growth in fluid media only Pseudomonas aeruginosa  --  Pseudomonas aeruginosa      .Blood Blood-Peripheral  11-12-19   No growth at 5 days.  --  --      .Urine Clean Catch (Midstream)  11-12-19   No growth  --  --                RADIOLOGY:  Images below reviewed personally  < from: Xray Chest 1 View- PORTABLE-Urgent (11.16.19 @ 14:10) >    IMPRESSION: Right upper extremity PICC line in good position.      < from: Xray Foot AP + Lateral + Oblique, Right (11.14.19 @ 17:59) >  IMPRESSION:     Status post amputation of the second ray to the level of the metatarsal   neck. Status post amputation of the distal aspect of the third metatarsal   and possibly of a portion of the base of the third proximal phalanx,   however, evaluation is limited by overlying antibiotic radiodense   material. No other interval change.

## 2019-11-20 NOTE — PROGRESS NOTE ADULT - REASON FOR ADMISSION
Persistent RIGHT forefoot pain, swelling for the past 6 days but worsening since 9/28/19

## 2019-11-20 NOTE — DISCHARGE NOTE NURSING/CASE MANAGEMENT/SOCIAL WORK - PATIENT PORTAL LINK FT
You can access the FollowMyHealth Patient Portal offered by [facility] by registering at the following website: [email or web address]. By joining [facility]’s FollowMyHealth portal, you will also be able to view your health information using other applications (apps) compatible with our system.

## 2019-11-20 NOTE — PROGRESS NOTE ADULT - PROBLEM SELECTOR PLAN 7
Transitions of Care Status:  1.  Name of PCP: Chirag Zuniga MD  - [phone]  2.  PCP Contacted on Admission: [  ] Y    [ x] N  office was contacted on 11/19 @2019  3.  PCP contacted at Discharge: [ ] Y    [ ] N    [ ] N/A  4.  Post-Discharge Appointment Date and Location:  5.  Summary of Handoff given to PCP: Transitions of Care Status:  1.  Name of PCP: Chirag Zuniga MD  - [phone]  2.  PCP Contacted on Admission: [  ] Y    [ x] N  office was contacted on 11/19 @2019  3.  PCP contacted at Discharge: [ X ] Y    [ ] N    [ ] N/A  4.  Post-Discharge Appointment Date and Location: pt will schedule an appointment  5.  Summary of Handoff given to PCP: yes

## 2019-11-20 NOTE — PROGRESS NOTE ADULT - PROBLEM SELECTOR PLAN 6
DVT: c/w Lovenox 40mg daily  Diet: CC  Dispo: likely home pending OR cultures/biopsy with IV antibiotics DVT: c/w Lovenox 40mg daily  Diet: CC  Dispo: Home with PICC line and IV antibiotics

## 2019-11-20 NOTE — PROGRESS NOTE ADULT - PROBLEM SELECTOR PLAN 1
outpatient MRI with signs of 2nd R toe osteo, +ESR/CRP   - s/p resection of right 2nd metatarsal and 3rd metatarsophalangeal joint on 11/14, cultures sent, NGTD, pending final path results but high concern for residual infection   - c/w vanc 1.25g q12h monitor vanc trough,  - c/w zosyn 3.375gram q8  - s/p PICC line 11/16, CXR confirmed  - OR cultures/biopsy so far showing coag neg staph, and pseudomonas.  - Pending final ID recs for antibiotics and podiatry dressing change before d/c  - pain control with tylenol and oxy prn outpatient MRI with signs of 2nd R toe osteo, +ESR/CRP   - s/p resection of right 2nd metatarsal and 3rd metatarsophalangeal joint on 11/14, cultures sent, NGTD, pending final path results but high concern for residual infection   - patient switched to cefepime 2g IV q12h via PICC to be given at 7AM and 7PM for 6 week course to be completed on 12/26.  - s/p PICC line 11/16, CXR confirmed  - OR cultures/biopsy showing coag neg staph, and pseudomonas.  - Cleared from podiatry and ID perspective for d/c  - pain control with tylenol and oxy prn

## 2019-11-20 NOTE — CHART NOTE - NSCHARTNOTEFT_GEN_A_CORE
Nutrition Note    Patient seen for follow up     Spoke to pt at bedside who reported good appetite and po intake, consuming 100% of meals, tolerating diet. Had some issues with ordering food items during the week but was resolved.      Diet: DASH, Consistent Carbohydrate diet        Daily Weight: 91.3 kg (11-20), 91.6 kg (11-14)  Weight Change: wt stable     Pertinent Medications: MEDICATIONS  (STANDING):  aspirin enteric coated 81 milliGRAM(s) Oral daily  cefepime   IVPB      cefepime   IVPB 2000 milliGRAM(s) IV Intermittent every 12 hours  chlorhexidine 4% Liquid 1 Application(s) Topical <User Schedule>  dextrose 5%. 1000 milliLiter(s) (50 mL/Hr) IV Continuous <Continuous>  dextrose 50% Injectable 12.5 Gram(s) IV Push once  dextrose 50% Injectable 25 Gram(s) IV Push once  enoxaparin Injectable 40 milliGRAM(s) SubCutaneous daily  insulin lispro (HumaLOG) corrective regimen sliding scale   SubCutaneous three times a day before meals  insulin lispro (HumaLOG) corrective regimen sliding scale   SubCutaneous at bedtime  metoprolol succinate  milliGRAM(s) Oral daily    MEDICATIONS  (PRN):  acetaminophen   Tablet .. 650 milliGRAM(s) Oral every 6 hours PRN Mild Pain (1 - 3)  dextrose 40% Gel 15 Gram(s) Oral once PRN Blood Glucose LESS THAN 70 milliGRAM(s)/deciliter  glucagon  Injectable 1 milliGRAM(s) IntraMuscular once PRN Glucose LESS THAN 70 milligrams/deciliter  oxyCODONE    IR 5 milliGRAM(s) Oral every 4 hours PRN Moderate Pain (4 - 6)  sodium chloride 0.9% lock flush 10 milliLiter(s) IV Push every 1 hour PRN Pre/post blood products, medications, blood draw, and to maintain line patency    Pertinent Labs:   Finger Sticks:  POCT Blood Glucose.: 126 mg/dL (11-20 @ 12:53)  POCT Blood Glucose.: 123 mg/dL (11-20 @ 08:31)  POCT Blood Glucose.: 172 mg/dL (11-19 @ 21:00)  POCT Blood Glucose.: 147 mg/dL (11-19 @ 17:28)  POCT Blood Glucose.: 188 mg/dL (11-19 @ 13:15)      Skin: no pressure ulcers noted      Edema: 1+ right ankle, right foot    Estimated Needs:   [X] no change since previous assessment  [ ] recalculated:     Previous Nutrition Diagnosis: overweight/obesity  Nutrition Diagnosis is: on-going    New Nutrition Diagnosis: n/a     Recommend  1) continue DASH, Consistent Carbohydrate diet     Monitoring and Evaluation:     Continue to monitor PO intake, tolerance to diet, weights, labs, skin integrity    RD remains available upon request and will follow up per protocol

## 2019-11-20 NOTE — PROGRESS NOTE ADULT - ATTENDING COMMENTS
Fazal Cervantes  Pager: [phone]. If no response or past 5 pm call [phone].
Seen at bedside.  Feeling well POD #1.  Sutures intact, surgical site stable, no hematoma or purulence.  followup OR data, will likely need PICC.  He has been on oral antibiotics for 2-3 weeks which may be reflected in intra-op cutlure data
Patient seen at bedside with family.  Discussed condition.  Discussed planned procedure of partial 2nd ray resection and 3rd met bone biopsy and/or met head resection.  Discussed at risk for TMA.  Discussed importance of compliance.
Osteo s/p amputation, - on tissue growth media showing pseudomonas, awaiting final culture sensitivities to finalize home antibiotic regimen
Discharge time spent: 34 min  stable for discharge with IV antibiotics.

## 2019-11-20 NOTE — PROGRESS NOTE ADULT - NSHPATTENDINGPLANDISCUSS_GEN_ALL_CORE
the primary team
wife bedside extensively

## 2019-11-20 NOTE — PROGRESS NOTE ADULT - SUBJECTIVE AND OBJECTIVE BOX
Podiatry pager #: [phone] ([place])/ [phone] ([facility])    Patient is a 74y old  Male who presents with a chief complaint of Persistent RIGHT forefoot pain, swelling for the past 6 days but worsening since 9/28/19 (20 Nov 2019 07:20)       INTERVAL HPI/OVERNIGHT EVENTS:  Patient seen and evaluated at bedside.  Pt is resting comfortable in NAD. Denies N/V/F/C.     Allergies    No Known Allergies    Intolerances        Vital Signs Last 24 Hrs  T(C): 36.6 (20 Nov 2019 04:37), Max: 36.8 (19 Nov 2019 21:07)  T(F): 97.9 (20 Nov 2019 04:37), Max: 98.3 (19 Nov 2019 21:07)  HR: 58 (20 Nov 2019 05:34) (58 - 70)  BP: 132/66 (20 Nov 2019 05:34) (129/63 - 173/73)  BP(mean): --  RR: 18 (20 Nov 2019 04:37) (18 - 18)  SpO2: 97% (20 Nov 2019 04:37) (97% - 98%)    LABS:              CAPILLARY BLOOD GLUCOSE      POCT Blood Glucose.: 172 mg/dL (19 Nov 2019 21:00)  POCT Blood Glucose.: 147 mg/dL (19 Nov 2019 17:28)  POCT Blood Glucose.: 188 mg/dL (19 Nov 2019 13:15)  POCT Blood Glucose.: 139 mg/dL (19 Nov 2019 09:12)      Lower Extremity Physical Exam:  Vascular: DP/PT 1/4, B/L, CFT <3 seconds B/L, Temperature gradient warm to cool, B/L.   Neuro: Epicritic sensation diminished to the level of digits, B/L.  Musculoskeletal/Ortho: s/p partial 2nd toe amp b/l    s/p right foot partial 2nd ray resection, 3rd MPJ arthroplasty closed over antibiotic beads (DOS 11/14/19)  Sutures intact w/ no dehiscence, flap viable, no hematoma and no active bleeding however 3cc sanguinous drianage was expressed w/ compression. Site is stable w/ no signs of infection.

## 2020-05-28 NOTE — CHART NOTE - NSCHARTNOTESELECT_GEN_ALL_CORE
Nutrition Services [Normal Breath Sounds] : Normal breath sounds [2+] : left 2+ [Alert] : alert [Oriented to Person] : oriented to person [Oriented to Place] : oriented to place [Calm] : calm [de-identified] : NCAT [de-identified] : NAD [FreeTextEntry1] : left arm radiocephalic AVF with excellent palpable thrill [de-identified] : right BKA stump fully healed, no ulcerations [de-identified] : supple

## 2020-08-09 NOTE — ED PROVIDER NOTE - CONSTITUTIONAL NEGATIVE STATEMENT, MLM
EMS states pt was eating dinner and shortly after had a decreased mental status and became much less responsive.  
no fever and no chills.

## 2021-01-24 ENCOUNTER — FORM ENCOUNTER (OUTPATIENT)
Age: 76
End: 2021-01-24

## 2021-01-25 ENCOUNTER — TRANSCRIPTION ENCOUNTER (OUTPATIENT)
Age: 76
End: 2021-01-25

## 2021-01-26 PROBLEM — E11.9 TYPE 2 DIABETES MELLITUS WITHOUT COMPLICATIONS: Chronic | Status: ACTIVE | Noted: 2019-11-12

## 2021-01-26 PROBLEM — Z00.00 ENCOUNTER FOR PREVENTIVE HEALTH EXAMINATION: Status: ACTIVE | Noted: 2021-01-26

## 2021-01-26 PROBLEM — I25.10 ATHEROSCLEROTIC HEART DISEASE OF NATIVE CORONARY ARTERY WITHOUT ANGINA PECTORIS: Chronic | Status: ACTIVE | Noted: 2019-11-12

## 2021-01-26 PROBLEM — I10 ESSENTIAL (PRIMARY) HYPERTENSION: Chronic | Status: ACTIVE | Noted: 2019-11-12

## 2021-01-28 ENCOUNTER — OUTPATIENT (OUTPATIENT)
Dept: OUTPATIENT SERVICES | Facility: HOSPITAL | Age: 76
LOS: 1 days | End: 2021-01-28
Payer: MEDICARE

## 2021-01-28 ENCOUNTER — APPOINTMENT (OUTPATIENT)
Dept: DISASTER EMERGENCY | Facility: HOSPITAL | Age: 76
End: 2021-01-28

## 2021-01-28 VITALS
RESPIRATION RATE: 18 BRPM | DIASTOLIC BLOOD PRESSURE: 85 MMHG | HEIGHT: 70 IN | TEMPERATURE: 98 F | HEART RATE: 83 BPM | OXYGEN SATURATION: 99 % | WEIGHT: 215.17 LBS | SYSTOLIC BLOOD PRESSURE: 149 MMHG

## 2021-01-28 VITALS
SYSTOLIC BLOOD PRESSURE: 165 MMHG | DIASTOLIC BLOOD PRESSURE: 75 MMHG | RESPIRATION RATE: 18 BRPM | OXYGEN SATURATION: 99 % | TEMPERATURE: 100 F | HEART RATE: 71 BPM

## 2021-01-28 DIAGNOSIS — U07.1 COVID-19: ICD-10-CM

## 2021-01-28 DIAGNOSIS — Z95.1 PRESENCE OF AORTOCORONARY BYPASS GRAFT: Chronic | ICD-10-CM

## 2021-01-28 PROCEDURE — M0239: CPT

## 2021-01-28 RX ORDER — BAMLANIVIMAB 35 MG/ML
700 INJECTION, SOLUTION INTRAVENOUS ONCE
Refills: 0 | Status: COMPLETED | OUTPATIENT
Start: 2021-01-28 | End: 2021-01-28

## 2021-01-28 RX ORDER — SODIUM CHLORIDE 9 MG/ML
250 INJECTION INTRAMUSCULAR; INTRAVENOUS; SUBCUTANEOUS
Refills: 0 | Status: DISCONTINUED | OUTPATIENT
Start: 2021-01-28 | End: 2021-02-11

## 2021-01-28 RX ADMIN — SODIUM CHLORIDE 25 MILLILITER(S): 9 INJECTION INTRAMUSCULAR; INTRAVENOUS; SUBCUTANEOUS at 13:20

## 2021-01-28 RX ADMIN — BAMLANIVIMAB 270 MILLIGRAM(S): 35 INJECTION, SOLUTION INTRAVENOUS at 12:14

## 2021-01-28 NOTE — CHART NOTE - NSCHARTNOTEFT_GEN_A_CORE
CC: Monoclonal Antibody Infusion/COVID 19 Positive  75yMale with PMHx DMII s/p CABG and symptoms of cough, malaise, weakness     exam/findings:  T(C): 36.8 (01-28-21 @ 11:45), Max: 36.8 (01-28-21 @ 11:45)  HR: 83 (01-28-21 @ 11:45) (83 - 83)  BP: 149/85 (01-28-21 @ 11:45) (149/85 - 149/85)  RR: 18 (01-28-21 @ 11:45) (18 - 18)  SpO2: 99% (01-28-21 @ 11:45) (99% - 99%)      PE:   Appearance: NAD	  HEENT:   Normal oral mucosa,   Lymphatic: No lymphadenopathy  Cardiovascular: Normal S1 S2, No JVD, No murmurs, No edema  Respiratory: Lungs clear to auscultation	  Gastrointestinal:  Soft, Non-tender, + BS	  Skin: warm and dry  Neurologic: Non-focal  Extremities: Normal range of motion, no calf tenderness or edema    ASSESSMENT:  Pt is a 75y with PMHx of DMII s/p CABG , Covid 19 Positive with symptoms in the last 10 days, who presents to infusion center for Monoclonal antibody infusion Bamlanivimab  Symptoms/ Criteria: cough, malaise, weakness  Risk Profile includes: Age >65, DMII    PLAN:  - infusion procedure explained to patient   -Consent for monoclonal antibody infusion obtained   - Risk & benifits discussed/all questions answered  -infuse  Bamlanivimab  700mg IV over one hour   -observe patient for one hour post infusion      I have reviewed the Bamlanivimab Emergency Use Authorization (EAU) and I have provided the patient or patient's caregiver with the following information:  1. FDA has authorized emergency use of Bamlanivimab, which is not FDA-approved biologic product.  2. The patient or patient's caregiver has the option to accept or refuse administration of Bamlanivimab  3. The significant known and benefits are unknown.  4. Information on available alternative treatments and risks and benefits of those alternatives.    Discharge:  Patient tolerated infusion well denies complaints of chest pain/SOB/dizziness/ palps  Vital signs stable  D/C instructions given/ fact sheet included.  Patient to follow-up with PCP as needed.

## 2021-01-29 ENCOUNTER — TRANSCRIPTION ENCOUNTER (OUTPATIENT)
Age: 76
End: 2021-01-29

## 2021-02-08 ENCOUNTER — TRANSCRIPTION ENCOUNTER (OUTPATIENT)
Age: 76
End: 2021-02-08

## 2021-02-11 NOTE — PROGRESS NOTE ADULT - ASSESSMENT
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 A CBC may also be warranted for monitoring.  The side effects of this medication were discussed with the patient, including but not limited to agranulocytosis, aplastic anemia, seizures, rashes, retinopathy, and liver toxicity. Patient instructed to call the office should any adverse effect occur.  The patient verbalized understanding of the proper use and possible adverse effects of Plaquenil.  All the patient's questions and concerns were addressed.

## 2021-03-04 ENCOUNTER — TRANSCRIPTION ENCOUNTER (OUTPATIENT)
Age: 76
End: 2021-03-04

## 2021-04-01 NOTE — BRIEF OPERATIVE NOTE - NSICDXBRIEFPREOP_GEN_ALL_CORE_FT
PRE-OP DIAGNOSIS:  Osteomyelitis of second toe of right foot 03-May-2019 15:01:42  Nuzhat Shah
- - -
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
## 2021-07-08 NOTE — DIETITIAN INITIAL EVALUATION ADULT. - NUTRITIONGOAL OUTCOME1
Refill request received from [facility] for hydroxyzine.    LOV 3/11/2021  FOV 9/16/2021  Last Fill  6/16/2020 #120 pills 5 refills  End Date NA    Script is loaded and is awaiting MD approval.  
meet >80% of needs, tolerance to diet, maintain stable wt in-house

## 2021-10-18 NOTE — ED PROVIDER NOTE - ATTENDING CONTRIBUTION TO CARE
Discharged
I was physically present for the E/M service provided. I agree with above history, physical, and plan which I have reviewed and edited where appropriate. I was physically present for the key portions of the service provided.    Cole: 75 y/o male pmh cardiac stents, bypass surgey, was sent in by his podiatrist Dr. Ferguson, had an injury to second left toe 2 weeks ago. no improving. + pain.    left 2nd toe, + erythematous, warm, tender, no discharge, no pus drainage, + lymphedema    a/p: osteo likely from PAD. sepsis work up, pod and admit

## 2021-10-26 NOTE — PROGRESS NOTE ADULT - PROBLEM/PLAN-6
[FreeTextEntry1] : Patient is taking 1/2 tab comtan for doses 3 and 5 and remains on 1.5 tabs LD q3hrs\par Her hallucinations have lessened mildly since her last visit\par Wearing offs appear to be mild and does not have bouts of immobility during the day. \par she is not taking azilect due to increased paranoid\par Has a new neck mass for the past several days\par \par nonmotor\par +RBD treated with klonopin\par Sleeping well\par no constipation\par No OH\par \par Meds:\par c/l 25/100 1.5tabs 5 times per day (q2.5-3hrs) \par comtan 1/2  tab 5 times per day\par sinemet CR 50/200 1 tab qhs\par seroquel 200mg qhs\par klonopin 0.5mg qhs\par nuplazid 34mg qd\par \par prior meds\par requip \par \par \par Srinath nelson [phone]\par \par 
DISPLAY PLAN FREE TEXT

## 2021-12-21 NOTE — PRE-OP CHECKLIST - INTERNAL PROSTHESES
The patient does have visually significant cataracts, but at this time, does not wish to pursue surgery. He was instructed to contact the office when or if he is considering surgery in the future.    Noticing intermittent diplopia, ortho on exam, EOMI, sounds like decompensated phoria, not bothering patient. This condition will be monitored.     yes(specify)/cardiac stents

## 2022-03-08 NOTE — ED PROVIDER NOTE - AMOUNT OF DRAINAGE
Diagnosis:   1. Other disorders of iron metabolism        Patient arrives for infusion today.     Dr Pagan is the ordering clinician, therapy plan orders reviewed and signed by clinician.     Vital Signs: BP: 99/60  Heart Rate: 75  Temp: 97.2 °F (36.2 °C)  Resp: 17  Weight: 58.9 kg (129 lb 13.6 oz)       Allergies:    ALLERGIES:   Allergen Reactions   • Ciprofloxacin Muscle Spasm     Couldn't get out of bed         Labs reviewed with the patient: N/A    Nursing Summary:  The patient is here for injectafer. She tolerated treatment well. Patient's mother was informed about next appointment in one week. She was informed about blood work and doctors appointment in  6 weeks. She verbalizes understanding. Patient stayed for 30 minutes observation period.    Patient condition stable during treatment? Yes  Questions were answered and understanding was verbalized. Yes   Education provided Yes    Patient advised on follow up, any and all questions answered.  Patient Discharged to home Ambulatory with self   none

## 2023-04-14 NOTE — PHYSICAL THERAPY INITIAL EVALUATION ADULT - NS ASR WT BEARING DETAIL RLE
- can take metoprolol before bed with the rest of your blood pressure pills   - start taking magnesium glycinate or any magnesium at least 400-500 mg every night 30 minutes before bed   - start a new medication called \"alendronate\" for treatment of osteoporosis ONCE A WEEK on Sunday mornings first thing in the morning with a big glass of water and NO FOOD until 30 minutes after taking the pill. You have to remain upright (either sitting or standing for one hour after taking the pill).   - you can eat as much dairy as you want   - keep the same dose of vitamin D 2,000 IU a day   - somebody will call you to schedule the bone density test in a year from now, should be done in [place] at same location as the last bone density test (DEXA scan)     Lab Result Notifications    If labs were ordered at your appointment today, we will contact you with results once all your labs have resulted. Please note certain hormone labs can take up to 10 business days to result.     Prescription Policy     Our goal is to serve you on a more timely basis. Currently, our office receives a large volume of phone requests for medication refills. We are requesting your cooperation with the following:    Look over your medications, diabetic supplies, etc. before coming to your appointment to see if you need any refills.    Request your medication (or supply) refills during your office visit.    Outside of an office visit, requests should be directed to your pharmacy even if you have zero refills. Call your pharmacy after 72 hours to see if your prescription is ready for pick-up.     Pharmacy Refills: All prescriptions take 48-72 hours to refill.      Our Patients are Important    Our goal is for your appointment to start at your appointment time.     Please arrive 15 minutes early to allow our staff adequate time to prepare you for your visit to meet with your provider at the scheduled appointment time.     Additionally, if you need to cancel  or change your appointment our clinic requests 24 - 48 hours notice, to allow us to refill that open appointment.     We want to improve and you can help. You may receive a survey asking you about this visit. Please complete this survey; we will use your feedback to make improvements. Thank you.     weight-bearing as tolerated

## 2024-06-10 NOTE — PHYSICAL THERAPY INITIAL EVALUATION ADULT - PRECAUTIONS/LIMITATIONS, REHAB EVAL
Xray chest 11/12: clear lungs.  Xray R foot 11/12: Bony destruction and lucency at the base of the proximal phalanges of the second and third digits as well as the distal aspects of the second and third metatarsals consistent with osteomyelitis. 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 Vascular calcifications.
Xray chest 11/12: clear lungs.  Xray R foot 11/12: Bony destruction and lucency at the base of the proximal phalanges of the second and third digits as well as the distal aspects of the second and third metatarsals consistent with osteomyelitis. 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 Vascular calcifications.
Skin normal color for race, warm, dry and intact. No evidence of rash.

## 2024-09-08 ENCOUNTER — NON-APPOINTMENT (OUTPATIENT)
Age: 79
End: 2024-09-08

## 2024-09-08 PROBLEM — M25.561 KNEE PAIN, RIGHT: Status: ACTIVE | Noted: 2024-09-08

## 2024-09-09 ENCOUNTER — APPOINTMENT (OUTPATIENT)
Dept: ORTHOPEDIC SURGERY | Facility: CLINIC | Age: 79
End: 2024-09-09
Payer: COMMERCIAL

## 2024-09-09 VITALS — BODY MASS INDEX: 31.1 KG/M2 | HEIGHT: 69 IN | WEIGHT: 210 LBS

## 2024-09-09 DIAGNOSIS — M17.11 UNILATERAL PRIMARY OSTEOARTHRITIS, RIGHT KNEE: ICD-10-CM

## 2024-09-09 DIAGNOSIS — M25.661 STIFFNESS OF RIGHT KNEE, NOT ELSEWHERE CLASSIFIED: ICD-10-CM

## 2024-09-09 DIAGNOSIS — M21.161 VARUS DEFORMITY, NOT ELSEWHERE CLASSIFIED, RIGHT KNEE: ICD-10-CM

## 2024-09-09 PROCEDURE — 73564 X-RAY EXAM KNEE 4 OR MORE: CPT | Mod: RT

## 2024-09-09 PROCEDURE — G2211 COMPLEX E/M VISIT ADD ON: CPT | Mod: NC

## 2024-09-09 PROCEDURE — 99203 OFFICE O/P NEW LOW 30 MIN: CPT

## 2024-09-09 NOTE — DISCUSSION/SUMMARY
[de-identified] : Discussed at length the nature of the patient's condition. Their right knee symptoms that were present for 2 weeks. appear secondary to degenerative arthritis, varus deformity and limited ROM.  While I believe he will eventually need a TKR, he is hesitant to have surgery at this time. We will start with a nonoperative course of treatment. Patient declined PT; therefore, he will continue with home exercise, weight management and activities as tolerated. He deferred injection therapy and will continue using Voltaren gel.   I will see them back in 3 months with x-rays.

## 2024-09-09 NOTE — HISTORY OF PRESENT ILLNESS
[de-identified] : DEONTE PATTERSON 79 year old male presents for initial evaluation of right knee pain that has been occurring in the last 2 weeks. He denies any injury or trauma to the knees. He states the pain has improved which he feels 80% better. He notes the pain is predominantly medial that is sharp and dull which does not radiate. He denies any mechanical symptoms and has baseline limited ROM. He can walk 100 feet without pain and was initially using rails while navigating stairs but can navigate one at a time. He denies taking any pain medications, no previous PT or injections therapy. PMHx is significant for CABG in 2017. NKDA.

## 2024-09-09 NOTE — ADDENDUM
[FreeTextEntry1] : This note was written by Kwesi Helton on 09/09/2024 acting as scribe for Dr. Rey Matamoros M.D.   I, Dr. Rey Matamoros, have read and attest that all the information, medical decision making and discharge instructions within are true and accurate.

## 2024-09-09 NOTE — PHYSICAL EXAM
[de-identified] : General appearance: well-nourished and hydrated, pleasant, alert and oriented x 3, cooperative. HEENT: Normocephalic, EOM intact, Nasal septum midline, Oral cavity clear, External auditory canal clear. Cardiovascular: no apparent abnormalities, no lower leg edema, varicose veins, pedal pulses are palpable. Lymphatics Lymph nodes: none palpated, Lymphedema: not present. Neurologic: sensation is normal, no muscle weakness in upper or lower extremities, patella tendon reflexes intact. Dermatologic no apparent skin lesions, moist, warm, no rash. Spine: cervical spine appears normal and moves freely, thoracic spine appears normal and moves freely, lumbosacral spine appears normal and moves freely. Gait: nonantalgic.   Left knee Inspection: no effusion or erythema. Wounds: well healed incision. Alignment: normal. Palpation: no specific tenderness on palpation. ROM active (in degrees): 0-125 with crepitus Ligamentous laxity: all ligaments appear stable,, negative ant. drawer test, negative post. drawer test, stable to varus stress test, stable to valgus stress test. negative Lachman's test, negative pivot shift test Meniscal Test: negative McMurrays, negative Diane. Patellofemoral Alignment Test: Q angle-, normal. Muscle Test: good quad strength. Leg examination: calf is soft and non-tender. Surgical incision on medial aspect of left calf.   Right knee Inspection: trace effusion or erythema. Wounds: none. Alignment: 5 degrees varus. Palpation: medial joint line tenderness on palpation. ROM active (in degrees): 5 degree flexion contracture,  Ligamentous laxity: all ligaments appear stable, negative ant. drawer test, negative post. drawer test, stable to varus stress test, stable to valgus stress test. negative Lachman's test, negative pivot shift test Meniscal Test: negative McMurrays, negative Diane. Patellofemoral Alignment Test: Q angle-, normal. Muscle Test: good quad strength. Leg examination: calf soft and non tender. Surgical incision on right thigh and lower leg   Left hip Inspection: No swelling or ecchymosis. Wounds: none. Palpation: non-tender. Stability: no instability. Strength: 5/5 all motor groups. ROM: no pain with FROM. Leg length: equal.   Right hip Inspection: No swelling or ecchymosis. Wounds: none. Palpation: non-tender. Stability: no instability. Strength: 5/5 all motor groups. ROM: no pain with FROM. Leg length: equal.   Left ankle Inspection: no erythema noted, no swelling noted. Palpation: no pain on palpation. ROM: stiff Muscle strength: 5/5. Stability: no instability noted.   Right ankle Inspection: no erythema noted, no swelling noted. ROM: stiff Palpation: no pain on palpation. Muscle strength: 5/5. Stability: no instability noted.   Left foot Inspection: hallux valgus and cross over of 1st and 2nd toe ROM: full range of motion of all joints without pain or crepitus. Palpation: no tenderness. Stability: no instability noted.   Right foot Inspection: hallux valgus and cross over of 1st and 2nd toe ROM: full range of motion of all joints without pain or crepitus. Palpation: no tenderness. Stability: no instability noted.   Left shoulder Inspection: no muscle asymmetry, no atrophy. Palpation: no tenderness noted, ACJ non-tender. ROM: full active ROM, full passive ROM. Strength testing): anterior deltoid, supraspinatus, infraspinatus, subscapularis all 5/5. Stability test: ant. apprehension negative, post. apprehension negative, relocation test negative. Impingement Test: negative NEER.   Right shoulder Inspection: no muscle asymmetry, no atrophy. Palpation: no tenderness noted, ACJ non-tender. ROM: full active ROM, full passive ROM. Strength testing): anterior deltoid, supraspinatus, infraspinatus, subscapularis all 5/5. Stability test: ant. apprehension negative, post. apprehension negative, relocation test negative. Impingement Test: negative NEER. Surgical Wounds: none.   Left elbow Inspection: negative swelling. Wounds: none. Palpation: non-tender. ROM: full ROM. Strength: 5/5 all groups. Stability: no instability. Mass: none.   Right elbow Inspection: negative swelling. Wounds: none. Palpation: non-tender. ROM: full ROM. Strength: 5/5 all groups. Stability: no instability. Mass: none.   Left wrist Inspection: negative swelling. Wound: none. Palpation (bone): no tenderness. ROM: full ROM. Strength: full , good.   Right wrist Inspection: negative swelling. Wound: none. Palpation (bone): no tenderness. ROM: full ROM. Strength: full , good.   Left hand Inspection: no skin changes, normal appearance. Wounds: none. Strength: full , able to make full fist. Sensation: light touch intact all fingers and thumb. Vascular: good capillary refill < 3 seconds, all fingers and thumb. Mass: none.   Right hand Inspection: no skin changes, normal appearance. Wounds: none. Strength: full , able to make full fist. Sensation: light touch intact all fingers and thumb. Vascular: good capillary refill < 3 seconds, all fingers and thumb. Mass: none. [de-identified] : Right knee x-rays, standing AP/Lateral and Merchant films, and 45-degree PA standing view, taken at the office today shows degenerative arthritis, mild varus deformity, marginal osteophytes, medial bone on bone sclerosis especially on the Walters view, patella at appropriate height and central position, patellofemoral joint space narrowing, peripheral osteophytes, Kellgren and Rehan grade 4 with multiple vascular clips noted in medial thigh.   Left knee x-ray merchant view taken at the office today demonstrates joint space narrowing and a well centered patella.

## 2024-11-19 ENCOUNTER — INPATIENT (INPATIENT)
Facility: HOSPITAL | Age: 79
LOS: 5 days | Discharge: HOME | DRG: 617 | End: 2024-11-25
Attending: INTERNAL MEDICINE | Admitting: STUDENT IN AN ORGANIZED HEALTH CARE EDUCATION/TRAINING PROGRAM
Payer: COMMERCIAL

## 2024-11-19 VITALS
HEART RATE: 75 BPM | WEIGHT: 207.9 LBS | SYSTOLIC BLOOD PRESSURE: 196 MMHG | DIASTOLIC BLOOD PRESSURE: 94 MMHG | RESPIRATION RATE: 16 BRPM | HEIGHT: 70 IN | OXYGEN SATURATION: 100 % | TEMPERATURE: 98 F

## 2024-11-19 DIAGNOSIS — M86.10 OTHER ACUTE OSTEOMYELITIS, UNSPECIFIED SITE: ICD-10-CM

## 2024-11-19 DIAGNOSIS — Z95.1 PRESENCE OF AORTOCORONARY BYPASS GRAFT: Chronic | ICD-10-CM

## 2024-11-19 LAB
ADD ON TEST-SPECIMEN IN LAB: SIGNIFICANT CHANGE UP
ADD ON TEST-SPECIMEN IN LAB: SIGNIFICANT CHANGE UP
ALBUMIN SERPL ELPH-MCNC: 4.1 G/DL — SIGNIFICANT CHANGE UP (ref 3.3–5)
ALP SERPL-CCNC: 71 U/L — SIGNIFICANT CHANGE UP (ref 40–120)
ALT FLD-CCNC: 22 U/L — SIGNIFICANT CHANGE UP (ref 10–45)
ANION GAP SERPL CALC-SCNC: 13 MMOL/L — SIGNIFICANT CHANGE UP (ref 5–17)
AST SERPL-CCNC: 51 U/L — HIGH (ref 10–40)
BASOPHILS # BLD AUTO: 0.04 K/UL — SIGNIFICANT CHANGE UP (ref 0–0.2)
BASOPHILS NFR BLD AUTO: 0.5 % — SIGNIFICANT CHANGE UP (ref 0–2)
BILIRUB SERPL-MCNC: 0.3 MG/DL — SIGNIFICANT CHANGE UP (ref 0.2–1.2)
BUN SERPL-MCNC: 25 MG/DL — HIGH (ref 7–23)
CALCIUM SERPL-MCNC: 9.3 MG/DL — SIGNIFICANT CHANGE UP (ref 8.4–10.5)
CHLORIDE SERPL-SCNC: 103 MMOL/L — SIGNIFICANT CHANGE UP (ref 96–108)
CO2 SERPL-SCNC: 22 MMOL/L — SIGNIFICANT CHANGE UP (ref 22–31)
CREAT SERPL-MCNC: 0.97 MG/DL — SIGNIFICANT CHANGE UP (ref 0.5–1.3)
CRP SERPL-MCNC: 24 MG/L — HIGH (ref 0–4)
EGFR: 79 ML/MIN/1.73M2 — SIGNIFICANT CHANGE UP
EOSINOPHIL # BLD AUTO: 0.11 K/UL — SIGNIFICANT CHANGE UP (ref 0–0.5)
EOSINOPHIL NFR BLD AUTO: 1.5 % — SIGNIFICANT CHANGE UP (ref 0–6)
GLUCOSE SERPL-MCNC: 196 MG/DL — HIGH (ref 70–99)
HCT VFR BLD CALC: 36.7 % — LOW (ref 39–50)
HGB BLD-MCNC: 12.6 G/DL — LOW (ref 13–17)
IMM GRANULOCYTES NFR BLD AUTO: 0.7 % — SIGNIFICANT CHANGE UP (ref 0–0.9)
LYMPHOCYTES # BLD AUTO: 1.7 K/UL — SIGNIFICANT CHANGE UP (ref 1–3.3)
LYMPHOCYTES # BLD AUTO: 23.1 % — SIGNIFICANT CHANGE UP (ref 13–44)
MCHC RBC-ENTMCNC: 32.1 PG — SIGNIFICANT CHANGE UP (ref 27–34)
MCHC RBC-ENTMCNC: 34.3 G/DL — SIGNIFICANT CHANGE UP (ref 32–36)
MCV RBC AUTO: 93.6 FL — SIGNIFICANT CHANGE UP (ref 80–100)
MONOCYTES # BLD AUTO: 0.68 K/UL — SIGNIFICANT CHANGE UP (ref 0–0.9)
MONOCYTES NFR BLD AUTO: 9.2 % — SIGNIFICANT CHANGE UP (ref 2–14)
NEUTROPHILS # BLD AUTO: 4.78 K/UL — SIGNIFICANT CHANGE UP (ref 1.8–7.4)
NEUTROPHILS NFR BLD AUTO: 65 % — SIGNIFICANT CHANGE UP (ref 43–77)
NRBC # BLD: 0 /100 WBCS — SIGNIFICANT CHANGE UP (ref 0–0)
PLATELET # BLD AUTO: 167 K/UL — SIGNIFICANT CHANGE UP (ref 150–400)
POTASSIUM SERPL-MCNC: 5.2 MMOL/L — SIGNIFICANT CHANGE UP (ref 3.5–5.3)
POTASSIUM SERPL-SCNC: 5.2 MMOL/L — SIGNIFICANT CHANGE UP (ref 3.5–5.3)
PROT SERPL-MCNC: 8 G/DL — SIGNIFICANT CHANGE UP (ref 6–8.3)
RBC # BLD: 3.92 M/UL — LOW (ref 4.2–5.8)
RBC # FLD: 13.1 % — SIGNIFICANT CHANGE UP (ref 10.3–14.5)
SODIUM SERPL-SCNC: 138 MMOL/L — SIGNIFICANT CHANGE UP (ref 135–145)
WBC # BLD: 7.36 K/UL — SIGNIFICANT CHANGE UP (ref 3.8–10.5)
WBC # FLD AUTO: 7.36 K/UL — SIGNIFICANT CHANGE UP (ref 3.8–10.5)

## 2024-11-19 PROCEDURE — 73630 X-RAY EXAM OF FOOT: CPT | Mod: 26,LT

## 2024-11-19 PROCEDURE — 99223 1ST HOSP IP/OBS HIGH 75: CPT

## 2024-11-19 PROCEDURE — 99285 EMERGENCY DEPT VISIT HI MDM: CPT

## 2024-11-19 RX ORDER — PIPERACILLIN SODIUM AND TAZOBACTAM SODIUM 4; .5 G/20ML; G/20ML
3.38 INJECTION, POWDER, LYOPHILIZED, FOR SOLUTION INTRAVENOUS ONCE
Refills: 0 | Status: COMPLETED | OUTPATIENT
Start: 2024-11-19 | End: 2024-11-19

## 2024-11-19 RX ORDER — VANCOMYCIN HCL 900 MCG/MG
1250 POWDER (GRAM) MISCELLANEOUS ONCE
Refills: 0 | Status: COMPLETED | OUTPATIENT
Start: 2024-11-19 | End: 2024-11-19

## 2024-11-19 RX ADMIN — PIPERACILLIN SODIUM AND TAZOBACTAM SODIUM 200 GRAM(S): 4; .5 INJECTION, POWDER, LYOPHILIZED, FOR SOLUTION INTRAVENOUS at 22:04

## 2024-11-19 RX ADMIN — Medication 166.67 MILLIGRAM(S): at 22:54

## 2024-11-19 NOTE — ED PROVIDER NOTE - OBJECTIVE STATEMENT
Mr. Bejarano is a 80 y/o male with PMHx HTN, left fourth toe erythema, swelling and redness x1 day. Patient has a history of admission and ray resection with podiatry in 2019 for L 2nd toe osteomyelitis. Patient had been in his normal state of health until last night when he took off his brace (wears for L hammer toe) and noticed that his L fourth digit was red and swollen. Was not painful, did not notice any discharge. Denies recent trauma. Wife applied bacitracin and patient went to bed thinking nothing of it. This morning the erythema persisted and swelling was a bit worse so he decided to present for evaluation. Denies fever, chills, chest pain, SOB, AP, n/v/d/c, pain to touch, discharge from wound, loss of sensation in feet. Denies history of diabetes, per chart review A1c 9.1% in 2019, not currently on medication for DM2. On repatha for cholesterol follows with [facility].

## 2024-11-19 NOTE — ED PROVIDER NOTE - NSICDXFAMILYHX_GEN_ALL_CORE_FT
FAMILY HISTORY:  Family history of diabetes mellitus in mother  No pertinent family history in first degree relatives

## 2024-11-19 NOTE — ED ADULT NURSE NOTE - NSFALLUNIVINTERV_ED_ALL_ED
Bed/Stretcher in lowest position, wheels locked, appropriate side rails in place/Call bell, personal items and telephone in reach/Instruct patient to call for assistance before getting out of bed/chair/stretcher/Non-slip footwear applied when patient is off stretcher/[place] to call system/Physically safe environment - no spills, clutter or unnecessary equipment/Purposeful proactive rounding/Room/bathroom lighting operational, light cord in reach

## 2024-11-19 NOTE — CONSULT NOTE ADULT - ASSESSMENT
79M presents for left foot 4th digit wound  -Pt was seen and evaluated  -Afebrile, no leukocytosis, ESR pending, CRP 2.4  -Left foot plantar-distal 4th digit wound to bone, dactylitis and erythema globally, blistering noted dorsally, 1 cc purulent drainage, no malodor, no fluctuance, right foot no open wounds or signs of infection  -Left foot X-ray: OM 4th distal phalanx, no gas (resident read)  -Obtained left foot wound culture   -Recommend admit to medicine  -Recommend Vancomycin/Zosyn  -Recommend ID consult  -Ordered left foot MRI with and without contrast  -Pod Plan: local wound care vs. left foot partial 4th digit amputation pending MRI  -Please document medial and cardiac clearance for podiatric surgery under anesthesia  -Discussed with attending  79M presents for left foot 4th digit wound  -Pt was seen and evaluated  -Afebrile, no leukocytosis, ESR pending, CRP 2.4  -Left foot plantar-distal 4th digit wound to bone, dactylitis and erythema globally, blistering noted dorsally, 1 cc purulent drainage, no malodor, no fluctuance, right foot no open wounds or signs of infection  -Left foot X-ray: OM 4th distal phalanx, no gas (resident read)  -Obtained left foot wound culture   -Recommend admit to medicine  -Recommend Vancomycin/Zosyn  -Recommend ID consult  -Ordered left foot MRI with and without contrast  -Ordered A1c  -Pod Plan: local wound care vs. left foot partial 4th digit amputation pending MRI  -Please document medial and cardiac clearance for podiatric surgery under anesthesia  -Discussed with attending

## 2024-11-19 NOTE — CONSULT NOTE ADULT - SUBJECTIVE AND OBJECTIVE BOX
Patient is a 79y old  Male who presents with a chief complaint of left foot 4th digit wound    HPI: Mr. Bejarano is a 80 y/o male with PMHx HTN, left fourth toe erythema, swelling and redness x1 day. Patient has a history of admission and ray resection with podiatry in 2019 for L 2nd toe osteomyelitis. Patient had been in his normal state of health until last night when he took off his brace (wears for L hammer toe) and noticed that his L fourth digit was red and swollen. Was not painful, did not notice any discharge. Denies recent trauma. Wife applied bacitracin and patient went to bed thinking nothing of it. This morning the erythema persisted and swelling was a bit worse so he decided to present for evaluation. Denies fever, chills, chest pain, SOB, AP, n/v/d/c, pain to touch, discharge from wound, loss of sensation in feet. Denies history of diabetes, per chart review A1c 9.1% in 2019, not currently on medication for DM2. On repatha for cholesterol follows with [facility].      PAST MEDICAL & SURGICAL HISTORY:  Benign essential HTN      CAD S/P percutaneous coronary angioplasty      Diabetes      Hypertension      Coronary artery disease      S/P CABG x 1      S/P CABG (coronary artery bypass graft)          MEDICATIONS  (STANDING):  vancomycin  IVPB 1250 milliGRAM(s) IV Intermittent Once    MEDICATIONS  (PRN):      Allergies    No Known Allergies    Intolerances        VITALS:    Vital Signs Last 24 Hrs  T(C): 36.6 (19 Nov 2024 21:00), Max: 36.7 (19 Nov 2024 17:18)  T(F): 97.8 (19 Nov 2024 21:00), Max: 98.1 (19 Nov 2024 17:18)  HR: 73 (19 Nov 2024 21:00) (73 - 75)  BP: 181/75 (19 Nov 2024 21:00) (181/75 - 196/94)  BP(mean): --  RR: 16 (19 Nov 2024 21:00) (16 - 16)  SpO2: 98% (19 Nov 2024 21:00) (98% - 100%)    Parameters below as of 19 Nov 2024 21:00  Patient On (Oxygen Delivery Method): room air        LABS:                          12.6   7.36  )-----------( 167      ( 19 Nov 2024 22:11 )             36.7       11-19    138  |  103  |  25[H]  ----------------------------<  196[H]  5.2   |  22  |  0.97    Ca    9.3      19 Nov 2024 18:47    TPro  8.0  /  Alb  4.1  /  TBili  0.3  /  DBili  x   /  AST  51[H]  /  ALT  22  /  AlkPhos  71  11-19      CAPILLARY BLOOD GLUCOSE              LOWER EXTREMITY PHYSICAL EXAM:    Vascular: DP/PT 2/4, B/L, CFT <3 seconds B/L, Temperature gradient warm to cool, B/L.   Neuro: Epicritic sensation diminished to the level of digits, B/L.  Musculoskeletal/Ortho: 2019 s/p bilateral 2nd digit amputations  Skin: left foot plantar-distal 4th digit wound to bone, dactylitis and erythema globally, blistering noted dorsally, 1 cc purulent drainage, no malodor, no fluctuance, right foot no open wounds or signs of infection      RADIOLOGY & ADDITIONAL STUDIES:    < from: Xray Foot AP + Lateral + Oblique, Left (11.19.24 @ 20:38) >    ******PRELIMINARY REPORT******      ******PRELIMINARY REPORT******         ACC: [id number] EXAM:  XR FOOT COMP MIN 3 VIEWS LT   ORDERED BY: DAVID CANO     PROCEDURE DATE:  11/19/2024    ******PRELIMINARY REPORT******      ******PRELIMINARY REPORT******           INTERPRETATION:  CLINICAL INDICATION: Concern for osteomyelitis of the   left fourth toe    COMPARISON: X-ray foot 5/3/2019, 5/1/2019    FINDINGS:  Cortical osseous destruction of the distal fourth phalanx. No   subcutaneous emphysema.  No acute fracture. Status post partial left 2nd toe amputation through   distal aspect of proximal phalanx.  No dislocation. Joint spaces are maintained. Normal alignment of the   tarsometatarsal joints.  Vascular calcifications.    IMPRESSION:Cortical osseous destruction of the distal fourth phalanx,   consistent with osteomyelitis.        ******PRELIMINARY REPORT******      ******PRELIMINARY REPORT******        URIEL VENEGAS MD; Resident Radiologist  This document is a PRELIMINARY interpretation and is pending final   attending approval. Nov 19 2024  9:10PM    < end of copied text >

## 2024-11-19 NOTE — ED PROVIDER NOTE - RAPID ASSESSMENT
80 y/o male with PMHx HTN, left fourth toe erythema, swelling and redness x1 day. Denied fever chills, drainage, trauma     NICK Duarte: Patient seen by myself in triage area for rapid assessment only. Full H&P to be performed in main emergency room by ER providers. 78 y/o male with PMHx HTN, left fourth toe erythema, swelling and redness x1 day. Denied fever chills, drainage, trauma     NICK Duarte: Patient seen by myself in triage area for rapid assessment only. Full H&P to be performed in main emergency room by ER providers.    Attending - Brad Augustine MD, cosign the chart.  I was available for supervisory role but did not directly see, perform a history, or examine the patient.  The patient was to be seen and further worked up in the main emergency department and their care was to be completed by the main emergency department team along with a physical exam. Receiving team will follow up on labs, analgesia, any clinical imaging, reassess and disposition as clinically indicated, all decisions regarding the progression of care will be made at their discretion.

## 2024-11-19 NOTE — ED PROVIDER NOTE - ATTENDING CONTRIBUTION TO CARE
I personally made the management plan, and take responsibility for the patient's management. I have discussed with and reviewed the Resident's note and agree with the History, ROS, Physical Exam and MDM unless otherwise indicated. A brief summary of my personal evaluation and impression can be found below.    79-year-old male with a history of hypertension, and hyperlipidemia presenting due to concern of pain in his left fourth toe with associated discharge.  Patient states that he has a history of ingrown toenails, previously had to have a fourth ray amputation due to a similar presentation.  Denies history of diabetes however has had elevated A1c's in the past.  Denies fever, chills, nausea, vomiting.  Follows with [facility] foot care Dr. Amrit Rich.     General: well-appearing, no acute distress  Head: Atraumatic, normocephalic  Eyes: EOM grossly in tact, no scleral icterus, no discharge  ENT: moist mucous membranes  Neurology: A&Ox 3, normal sensation in L foot  Respiratory: normal respiratory effort  CV: 2+ DP pulse in L foot Extremities warm and well perfused  Extremities: L 4th ray partial amputation, L 2nd ray distal aspect erythematous w/ some purplish discolartion, punctate ulcer at distal aspect w/ pus draining.   Skin: warm and dry. No rashes      Differential diagnosis include but is not limited to diabetic foot ulcer, osteo-, cellulitis, fungal infection.  Will get x-ray to evaluate for bony involvement, labs including ESR and CRP, likely podiatry consult given patient has some drainage from his toe.  May require admission for amputation if osteo present.

## 2024-11-19 NOTE — ED ADULT NURSE NOTE - OBJECTIVE STATEMENT
Pt is a 80 y/o male PMH HTN presents to the ED c/o toe redness and pain. Pt states he has had a resection of his L 2nd toe in 2019 d/t osteomyelitis. Pt noticed erythema and edema on his L fourth digit last night after taking his brace off that he wears for his L 2nd digit. Pt states it is not painful and did not notice any discharge. Pt states this morning the swelling and redness worsened and that is why he came to the ED. Upon assessment, pt toe is erythematous, swollen and some redness has traveled up his LL. Pt denies fever/chills. chest pain, SOB, N/V/D, pain to touch on the toe, discharge from toe or loss of sensation.

## 2024-11-19 NOTE — H&P ADULT - PROBLEM SELECTOR PLAN 1
- seen on xray  - will get MRI  - cont vanc, zosyn  - check fabiano, a1c  - f/u blood cultures  - f/u podiatry recs

## 2024-11-19 NOTE — H&P ADULT - NSHPREVIEWOFSYSTEMS_GEN_ALL_CORE
REVIEW OF SYSTEMS:  CONSTITUTIONAL: No weakness. No fevers. No chills. No rigors. No poor appetite.  EYES: No blurry or double vision. No eye pain.  ENT: No hearing difficulty. No sore throat. No Sinusitis/rhinorrhea.   NECK: No pain. No stiffness/rigidity.  CARDIAC: No chest pain. No palpitations. No lightheadedness. No syncope.  RESPIRATORY: No cough. No SOB.   GASTROINTESTINAL: No abdominal pain. No nausea. No vomiting. No diarrhea. No constipation.   GENITOURINARY: No dysuria. No frequency. No oliguria.  NEUROLOGICAL: No numbness/tingling. No focal weakness. No headache. No unsteady gait.  BACK: No back pain. No flank pain.  EXTREMITIES: No lower extremity edema. Full ROM. as per hpi  SKIN: as per hpi  PSYCHIATRIC: No depression. No anxiety.   ALLERGIC: No lip swelling. No hives.  All other review of systems is negative unless indicated above.  Unless indicated above, unable to assess ROS 2/2

## 2024-11-19 NOTE — H&P ADULT - NSHPPHYSICALEXAM_GEN_ALL_CORE
PHYSICAL EXAM:   GENERAL: Alert. Not confused. No acute distress. Not thin. Not cachectic. Not obese.  HEAD:  Atraumatic. Normocephalic.  EYES: EOMI. PERRLA. Normal conjunctiva/sclera.  ENT: Neck supple. No JVD. Moist oral mucosa. Not edentulous. No thrush.  LYMPH: Normal supraclavicular/cervical lymph nodes.   CARDIAC: Not tachy, Not malgorzata. Regular rhythm. Not irregularly irregular. S1. S2. No murmur. No rub. No distant heart sounds.  LUNG/CHEST: CTAB. BS equal bilaterally. No wheezes. No rales. No rhonchi.  ABDOMEN: Soft. No tenderness. No distension. No fluid wave. Normal bowel sounds.  BACK: No midline/vertebral tenderness. No flank tenderness.  VASCULAR: +2 b/l radial or ulnar pulses. Palpable DP pulses.  EXTREMITIES:  No clubbing. No cyanosis. +1-2 b/l LE pitting edema. Moving all 4.  NEUROLOGY: A&Ox3. Non-focal exam. Cranial nerves intact. Normal speech. Sensation intact.  PSYCH: Normal behavior. Normal affect.    Vital Signs Last 24 Hrs  T(C): 36.4 (20 Nov 2024 00:55), Max: 36.7 (19 Nov 2024 17:18)  T(F): 97.6 (20 Nov 2024 00:55), Max: 98.1 (19 Nov 2024 17:18)  HR: 73 (20 Nov 2024 00:55) (73 - 75)  BP: 168/74 (20 Nov 2024 00:55) (168/74 - 196/94)  BP(mean): --  ABP: --  ABP(mean): --  RR: 16 (20 Nov 2024 00:55) (16 - 16)  SpO2: 99% (20 Nov 2024 00:55) (98% - 100%)    O2 Parameters below as of 20 Nov 2024 00:55  Patient On (Oxygen Delivery Method): room air          I&O's Summary

## 2024-11-19 NOTE — H&P ADULT - PROBLEM SELECTOR PLAN 3
Problem: Fatigue (Oncology Care)  Goal: Improved Activity Tolerance  Intervention: Promote Improved Energy  Flowsheets (Taken 3/25/2024 1231)  Fatigue Management:   activity schedule adjusted   fatigue-related activity identified   frequent rest breaks encouraged   paced activity encouraged  Sleep/Rest Enhancement:   natural light exposure provided   relaxation techniques promoted   regular sleep/rest pattern promoted  Activity Management:   Ambulated -L4   Ambulated to bathroom - L4   Ambulated in jenkins - L4   Up in stretcher chair - L1     Problem: Adult Inpatient Plan of Care  Goal: Patient-Specific Goal (Individualized)  Outcome: Ongoing, Progressing  Flowsheets (Taken 3/25/2024 1231)  Anxieties, Fears or Concerns: none  Individualized Care Needs: recliner, warm blanket, coffee, niece at chairside, conversation      - cont asa

## 2024-11-19 NOTE — H&P ADULT - HISTORY OF PRESENT ILLNESS
79M c hx CAD s/p ?1vCABG (15 yrs ago @ [facility]) c/b chronic b/l LE edema of unclear etiol, osteomyelitis s/p left 2nd toe partial amputation, denies HTN, denies DM2 (A1c 6.1 Nov '19, A1c 9.1 May '19), pw 1 day of left 4th toe redness, pain, swelling.    pt is very functional, goes to gym 3 times a week. denies hx of peripheral neuropathy. Pt states being in usual state of health until yesterday, when pt noticed a red spot on left 4th toe. Pt thought it might be his toenail that cut into him, put bacitracin on it, went to sleep. Today, noticed that left 4th toe was significantly more red, swollen, and having sensation of needle sticking in his toe.

## 2024-11-19 NOTE — H&P ADULT - NSHPSOCIALHISTORY_GEN_ALL_CORE
Social History:    Marital Status: ( x ) , (  ) Single, (  ) , (  ) , (  )   # of Children:   Lives with: (  ) alone, ( x ) children, ( x ) spouse, (  ) parents, (  ) siblings, (  ) friends, (  ) other:   Occupation:     Substance Use/Illicit Drugs: (  ) never used vs other:   Tobacco Usage: (  ) never smoked, ( x ) former smoker, (  ) current smoker and Total Pack-Years: 35 pk yrs  Last Alcohol Usage/Frequency/Amount/Withdrawal/Hx of Abuse:  none  Foreign travel:   Animal exposure:

## 2024-11-19 NOTE — H&P ADULT - NSHPLABSRESULTS_GEN_ALL_CORE
Personally reviewed old records.  Personally reviewed labs.  Personally reviewed imaging.  Personally reviewed EKG.                          12.6   7.36  )-----------( 167      ( 19 Nov 2024 22:11 )             36.7       11-19    138  |  103  |  25[H]  ----------------------------<  196[H]  5.2   |  22  |  0.97    Ca    9.3      19 Nov 2024 18:47    TPro  8.0  /  Alb  4.1  /  TBili  0.3  /  DBili  x   /  AST  51[H]  /  ALT  22  /  AlkPhos  71  11-19            LIVER FUNCTIONS - ( 19 Nov 2024 18:47 )  Alb: 4.1 g/dL / Pro: 8.0 g/dL / ALK PHOS: 71 U/L / ALT: 22 U/L / AST: 51 U/L / GGT: x                 Urinalysis Basic - ( 19 Nov 2024 18:47 )    Color: x / Appearance: x / SG: x / pH: x  Gluc: 196 mg/dL / Ketone: x  / Bili: x / Urobili: x   Blood: x / Protein: x / Nitrite: x   Leuk Esterase: x / RBC: x / WBC x   Sq Epi: x / Non Sq Epi: x / Bacteria: x

## 2024-11-19 NOTE — ED ADULT TRIAGE NOTE - HEIGHT IN FEET
5 Physical Therapy Evaluation    Visit Type: Initial Evaluation  Visit: 1  Referring Provider: López Jolley DPM  Medical Diagnosis (from order): M72.2 - Plantar fasciitis   Treatment Diagnosis: right ankle, right foot - increased pain/symptoms, impaired strength, impaired range of motion, impaired posture, impaired activity tolerance and impaired gait.  Chart reviewed at time of initial evaluation (relevant co-morbidities, allergies, tests and medications listed):   Past Medical History:  2015: Osteopenia      Comment:  diagnosed on peripheral heel DXA scan -1.4SD  2016: Osteoporosis      Comment:  confirmed on DXA scan    Past Surgical History:  12/2018: Ir phlebectomy varicose veins one extremity 10-20 incisions;   Left    Current Outpatient Medications:  methylPREDNISolone (MEDROL DOSEPAK) 4 MG tablet, follow package directions  meloxicam (Mobic) 7.5 MG tablet, Take 1 tablet by mouth 2 times daily as needed for Pain.  Cholecalciferol (VITAMIN D3 PO),   Cyanocobalamin (VITAMIN B12 PO), Take  by mouth.  Multiple Vitamins-Minerals (CENTRUM SILVER PO), Take  by mouth.  Calcium Carbonate (CALCIUM 600 PO), Take  by mouth.    No current facility-administered medications for this visit.    ALLERGIES:   -- Tramadol -- GI UPSET            SUBJECTIVE                                                                                                               Darrion presents for initial Physical Therapy evaluation for right plantar fascitis with onset ***.      OBJECTIVE                                                                                                                                     Treatment     Therapeutic Activity  Patient educated on anatomy/physiology of symptoms related to functional limitations.     Instructed and performed exercises as documented below in home exercise program section. Home exercise program given and demonstrated by patient, ***    Discussion on treatment plan and POC. Patient in  agreement with Plan of Care. Discussed need for authorization following evaluation. ***     Skilled input: verbal instruction/cues, tactile instruction/cues and demonstration    Writer verbally educated and received verbal consent for hand placement, positioning of patient, and techniques to be performed today from patient for hand placement and palpation for techniques as described above and how they are pertinent to the patient's plan of care.  Home Exercise Program  [facility]  ***    ASSESSMENT                                                                                                          62 year old patient has reported functional limitations listed above impacted by signs and symptoms consistent with treatment diagnosis below.  Treatment Diagnosis:   - Involved: right ankle, right foot.  - Symptoms/impairments: increased pain/symptoms, impaired strength, impaired range of motion, impaired posture, impaired activity tolerance and impaired gait.    Prognosis: Patient will benefit from skilled therapy.  Rehabilitative potential is: good.  Predicted patient presentation: Low (stable) - Patient comorbidities and complexities, as defined above, will have little effect on progress for prescribed plan of care.  Education:   - Present and ready to learn: patient  - Results of above outlined education: Verbalizes understanding and Demonstrates understanding    PLAN                                                                                                                         The following skilled interventions to be implemented to achieve goals listed below:  Neuromuscular Re-Education (68524)  Therapeutic Exercise (89316)  Therapeutic Activity (87256)  Gait Training (55294)  Manual Therapy (79970)    Frequency / Duration  2 times per week tapering as patient progresses for 8 weeks for an estimated total of 12 visits    Patient involved in and agreed to plan of care and goals.  Patient offered attendance  policy at [facility].    Suggestions for next session as indicated: Progress per plan of care. Check for doctor signature for Plan of Care. ***  Goals  {impairments:531529}  The above improvements in impairments to assist in obtaining goals listed below  Long Term Goals: to be met by end of plan of care  1. {all goals:200289}  2. {all goals:200289}  3. {Outcome Goals:200299}    Therapy procedure time and total treatment time can be found documented on the Time Entry flowsheet

## 2024-11-19 NOTE — ED PROVIDER NOTE - NSICDXPASTMEDICALHX_GEN_ALL_CORE_FT
PAST MEDICAL HISTORY:  Benign essential HTN     CAD S/P percutaneous coronary angioplasty     Coronary artery disease     Diabetes     Hypertension

## 2024-11-19 NOTE — ED PROVIDER NOTE - PHYSICAL EXAMINATION
T(C): 36.7 (11-19-24 @ 17:18), Max: 36.7 (11-19-24 @ 17:18)  HR: 75 (11-19-24 @ 17:18) (75 - 75)  BP: 196/94 (11-19-24 @ 17:18) (196/94 - 196/94)  RR: 16 (11-19-24 @ 17:18) (16 - 16)  SpO2: 100% (11-19-24 @ 17:18) (100% - 100%)    CONSTITUTIONAL: Well groomed, no apparent distress  ENMT: Oral mucosa with moist membranes. Normal dentition; no pharyngeal injection or exudates  NECK: Supple, symmetric and without tracheal deviation   RESP: No respiratory distress, no use of accessory muscles; CTA b/l, no WRR  CV: RRR, +S1S2, no MRG. Well healed sternotomy scar  GI: Soft, NT, ND, no rebound, no guarding  MSK:  LLE with anterior erythema with associated swelling, 2+ edema. L 4th digit is erythematous and swollen with pus/blood draining from underside of distal 4th digit  SKIN: as above   NEURO: nonfocal, distal sensation intact  PSYCH: Appropriate insight/judgment; A+O x 3, mood and affect appropriate, recent/remote memory intact

## 2024-11-19 NOTE — H&P ADULT - ASSESSMENT
79M c hx CAD s/p ?1vCABG (15 yrs ago @ [facility]) c/b chronic b/l LE edema of unclear etiol, osteomyelitis s/p left 2nd toe partial amputation, denies HTN, denies DM2 (A1c 6.1 Nov '19, A1c 9.1 May '19), pw 1 day of left 4th toe redness, pain, swelling.

## 2024-11-19 NOTE — ED PROVIDER NOTE - CLINICAL SUMMARY MEDICAL DECISION MAKING FREE TEXT BOX
Mr. Bejarano is a 78 y/o male with PMHx HTN, left fourth toe erythema, swelling and redness x1 day. Patient has a history of admission and ray resection with podiatry in 2019 for L 2nd toe osteomyelitis. Xray shows evidence of cortical destruction of the L 4th digit c/w osteomyelitis. Will check lab work Mr. Bejarano is a 80 y/o male with PMHx HTN, left fourth toe erythema, swelling and redness x1 day. Patient has a history of admission and ray resection with podiatry in 2019 for L 2nd toe osteomyelitis. Xray shows evidence of cortical destruction of the L 4th digit c/w osteomyelitis. Will check lab work and blood cultures, start vanc and zosyn, and consult podiatry. Likely admit.

## 2024-11-20 DIAGNOSIS — I25.10 ATHEROSCLEROTIC HEART DISEASE OF NATIVE CORONARY ARTERY WITHOUT ANGINA PECTORIS: ICD-10-CM

## 2024-11-20 DIAGNOSIS — M86.10 OTHER ACUTE OSTEOMYELITIS, UNSPECIFIED SITE: ICD-10-CM

## 2024-11-20 DIAGNOSIS — M79.89 OTHER SPECIFIED SOFT TISSUE DISORDERS: ICD-10-CM

## 2024-11-20 LAB
GRAM STN FLD: ABNORMAL
SPECIMEN SOURCE: SIGNIFICANT CHANGE UP

## 2024-11-20 PROCEDURE — 99255 IP/OBS CONSLTJ NEW/EST HI 80: CPT

## 2024-11-20 PROCEDURE — 93923 UPR/LXTR ART STDY 3+ LVLS: CPT | Mod: 26

## 2024-11-20 PROCEDURE — 99233 SBSQ HOSP IP/OBS HIGH 50: CPT

## 2024-11-20 PROCEDURE — 73720 MRI LWR EXTREMITY W/O&W/DYE: CPT | Mod: 26,LT

## 2024-11-20 PROCEDURE — 93970 EXTREMITY STUDY: CPT | Mod: 26

## 2024-11-20 RX ORDER — ENOXAPARIN SODIUM 30 MG/.3ML
40 INJECTION SUBCUTANEOUS EVERY 24 HOURS
Refills: 0 | Status: DISCONTINUED | OUTPATIENT
Start: 2024-11-20 | End: 2024-11-22

## 2024-11-20 RX ORDER — VANCOMYCIN HCL 900 MCG/MG
1250 POWDER (GRAM) MISCELLANEOUS EVERY 12 HOURS
Refills: 0 | Status: DISCONTINUED | OUTPATIENT
Start: 2024-11-20 | End: 2024-11-22

## 2024-11-20 RX ORDER — LOSARTAN POTASSIUM 100 MG/1
25 TABLET, FILM COATED ORAL DAILY
Refills: 0 | Status: DISCONTINUED | OUTPATIENT
Start: 2024-11-20 | End: 2024-11-21

## 2024-11-20 RX ORDER — EVOLOCUMAB 140 MG/ML
140 INJECTION, SOLUTION SUBCUTANEOUS
Refills: 0 | DISCHARGE

## 2024-11-20 RX ORDER — PIPERACILLIN SODIUM AND TAZOBACTAM SODIUM 4; .5 G/20ML; G/20ML
3.38 INJECTION, POWDER, LYOPHILIZED, FOR SOLUTION INTRAVENOUS EVERY 8 HOURS
Refills: 0 | Status: DISCONTINUED | OUTPATIENT
Start: 2024-11-20 | End: 2024-11-22

## 2024-11-20 RX ORDER — METOPROLOL TARTRATE 100 MG/1
25 TABLET, FILM COATED ORAL DAILY
Refills: 0 | Status: DISCONTINUED | OUTPATIENT
Start: 2024-11-20 | End: 2024-11-22

## 2024-11-20 RX ADMIN — PIPERACILLIN SODIUM AND TAZOBACTAM SODIUM 25 GRAM(S): 4; .5 INJECTION, POWDER, LYOPHILIZED, FOR SOLUTION INTRAVENOUS at 15:01

## 2024-11-20 RX ADMIN — LOSARTAN POTASSIUM 25 MILLIGRAM(S): 100 TABLET, FILM COATED ORAL at 13:56

## 2024-11-20 RX ADMIN — Medication 81 MILLIGRAM(S): at 13:12

## 2024-11-20 RX ADMIN — Medication 166.67 MILLIGRAM(S): at 17:25

## 2024-11-20 RX ADMIN — Medication 166.67 MILLIGRAM(S): at 05:57

## 2024-11-20 RX ADMIN — PIPERACILLIN SODIUM AND TAZOBACTAM SODIUM 25 GRAM(S): 4; .5 INJECTION, POWDER, LYOPHILIZED, FOR SOLUTION INTRAVENOUS at 08:50

## 2024-11-20 RX ADMIN — ENOXAPARIN SODIUM 40 MILLIGRAM(S): 30 INJECTION SUBCUTANEOUS at 05:58

## 2024-11-20 RX ADMIN — METOPROLOL TARTRATE 25 MILLIGRAM(S): 100 TABLET, FILM COATED ORAL at 05:58

## 2024-11-20 NOTE — PHARMACOTHERAPY INTERVENTION NOTE - COMMENTS
Performed medication reconciliation and home medication list updated in prescription writer/outpatient medication review. Medications verified with patient and pharmacy.     Home Pharmacy: [facility]  Allergies: None, confirmed    Home Medications:  aspirin 81 mg oral delayed release tablet: 1 tab(s) orally once a day  metoprolol succinate 25 mg oral capsule, extended release: 1 cap(s) orally once a day  Repatha 140 mg/mL subcutaneous solution: 140 milligram(s) subcutaneously every 2 weeks (next dose due 11/20 per patient)    Candelario (David Lovell) Dao - PharmD, BCPS  Transitions of Care Pharmacist  Available on [employer] Teams (Preferred)  Spectra: [phone]

## 2024-11-20 NOTE — PROGRESS NOTE ADULT - NSPROGADDITIONALINFOA_GEN_ALL_CORE
disposition: imaging pending, ID pending  discussed with acp    Cammy Hoffman D.O.  Division of Hospital Medicine  Available on MS Teams

## 2024-11-20 NOTE — PROGRESS NOTE ADULT - ASSESSMENT
79M presents for left foot 4th digit wound  - Pt was seen and evaluated  - Afebrile, no leukocytosis  - Left foot plantar-distal 4th digit wound to bone, dactylitis and erythema globally, blistering noted dorsally, 1 cc purulent drainage, no malodor, no fluctuance, right foot no open wounds or signs of infection.  - Left foot X-ray: OM 4th distal phalanx, no gas  - Left foot wound culture: pending  - Recommend ID consult  - Left foot MRI: pending  - Pod Plan: local wound care vs. left foot partial 4th digit amputation on Friday 11/22 at 9:30 with Dr. Ferguson pending MRI  - Please document medial and cardiac clearance for podiatric surgery under anesthesia  - Seen with attending

## 2024-11-20 NOTE — PROGRESS NOTE ADULT - SUBJECTIVE AND OBJECTIVE BOX
Patient is a 79y old  Male who presents with a chief complaint of left 4th toe redness, pain, swelling (20 Nov 2024 12:06)       INTERVAL HPI/OVERNIGHT EVENTS:  Patient seen and evaluated at bedside.  Pt is resting comfortable in NAD. Denies N/V/F/C.    Allergies    No Known Allergies    Intolerances        Vital Signs Last 24 Hrs  T(C): 36.3 (20 Nov 2024 12:29), Max: 36.7 (19 Nov 2024 17:18)  T(F): 97.4 (20 Nov 2024 12:29), Max: 98.1 (19 Nov 2024 17:18)  HR: 72 (20 Nov 2024 12:55) (61 - 75)  BP: 180/73 (20 Nov 2024 12:55) (161/68 - 196/94)  BP(mean): --  RR: 18 (20 Nov 2024 12:29) (16 - 18)  SpO2: 100% (20 Nov 2024 12:55) (98% - 100%)    Parameters below as of 20 Nov 2024 12:55  Patient On (Oxygen Delivery Method): room air        LABS:                        12.6   7.36  )-----------( 167      ( 19 Nov 2024 22:11 )             36.7     11-19    138  |  103  |  25[H]  ----------------------------<  196[H]  5.2   |  22  |  0.97    Ca    9.3      19 Nov 2024 18:47    TPro  8.0  /  Alb  4.1  /  TBili  0.3  /  DBili  x   /  AST  51[H]  /  ALT  22  /  AlkPhos  71  11-19      Urinalysis Basic - ( 19 Nov 2024 18:47 )    Color: x / Appearance: x / SG: x / pH: x  Gluc: 196 mg/dL / Ketone: x  / Bili: x / Urobili: x   Blood: x / Protein: x / Nitrite: x   Leuk Esterase: x / RBC: x / WBC x   Sq Epi: x / Non Sq Epi: x / Bacteria: x      CAPILLARY BLOOD GLUCOSE          Lower Extremity Physical Exam:  Vascular: DP/PT 2/4, B/L, CFT <3 seconds B/L, Temperature gradient warm to cool, B/L.   Neuro: Epicritic sensation diminished to the level of digits, B/L.  Musculoskeletal/Ortho: 2019 s/p bilateral 2nd digit amputations  Skin: Left foot plantar-distal 4th digit wound to bone, dactylitis, erythema globally, blistering noted dorsally, 1 cc purulent drainage, no malodor, no fluctuance, right foot no open wounds or signs of infection      RADIOLOGY & ADDITIONAL TESTS:

## 2024-11-20 NOTE — PROGRESS NOTE ADULT - ASSESSMENT
79M c hx CAD s/p ?1vCABG (15 yrs ago @ [facility]) c/b chronic b/l LE edema of unclear etiol, osteomyelitis s/p left 2nd toe partial amputation, denies HTN, denies DM2 (A1c 6.1 Nov '19, A1c 9.1 May '19), pw 1 day of left 4th toe redness, pain, swelling.

## 2024-11-20 NOTE — CONSULT NOTE ADULT - SUBJECTIVE AND OBJECTIVE BOX
HPI:  79 m with DM, HTN, CAD s/p CABG, previous toe osteomyelitis s/p 2nd toe partial amp and 6 week course of cefepime 2019 ( pseudomonas infection) now p/w left 4th toe redness, pain, swelling.  afebrile, no WBC  exam showed L  plantar-distal 4th digit wound to bone, dactylitis and erythema globally, blistering noted dorsally, 1 cc purulent drainage  xray showed Cortical osseous destruction of the distal fourth phalanx, consistent with osteomyelitis.    PAST MEDICAL & SURGICAL HISTORY:  Benign essential HTN      CAD S/P percutaneous coronary angioplasty      Diabetes      Hypertension      Coronary artery disease      S/P CABG x 1      S/P CABG (coronary artery bypass graft)          Allergies    No Known Allergies    Intolerances        ANTIMICROBIALS:  piperacillin/tazobactam IVPB.. 3.375 every 8 hours  vancomycin  IVPB 1250 every 12 hours      OTHER MEDS:  aspirin enteric coated 81 milliGRAM(s) Oral daily  enoxaparin Injectable 40 milliGRAM(s) SubCutaneous every 24 hours  losartan 25 milliGRAM(s) Oral daily  metoprolol succinate ER 25 milliGRAM(s) Oral daily      SOCIAL HISTORY:  , lives with family, works as an , no pets atone  former smoker 30 years ago, no alcohol or drug abuse  no recent travel    FAMILY HISTORY:  Family history of diabetes mellitus in mother        ROS:    All other systems negative     Constitutional: no fever, no chills, no weight loss, no night sweats  Eye: no eye pain, no redness, no vision changes  ENT:  no sore throat, no rhinorrhea  Cardiovascular:  no chest pain, no palpitation  Respiratory:  no SOB, no cough  GI:  no abd pain, no vomiting, no diarrhea  urinary: no dysuria, no hematuria, no flank pain  : no scrotal pain  musculoskeletal: L 4th toe pain, edema and erythema  skin:  no rash  neurology:  no headache, no seizure, no change in mental status  psych: no anxiety, no depression     Physical Exam:    General:    NAD, non toxic  Head: atraumatic, normocephalic  Eyes: normal sclera and conjunctiva  ENT:   no oropharyngeal lesions, no LAD, neck supple  Cardio:    regular S1,S2  Respiratory:   clear b/l, no wheezing  abd:   soft, BS +, not tender  :     no CVAT, no suprapubic tenderness, no eduardo  Musculoskeletal :  s/p L 2nd toe amp, 4th toe erythema and edema, plantar wound, some erythema above the ankle  Skin:    no rash  vascular: no phlebitis  Neurologic:     no focal deficits  psych: normal affect      Drug Dosing Weight  Height (cm): 177.8 (19 Nov 2024 17:18)  Weight (kg): 94.3 (19 Nov 2024 17:18)  BMI (kg/m2): 29.8 (19 Nov 2024 17:18)  BSA (m2): 2.12 (19 Nov 2024 17:18)    Vital Signs Last 24 Hrs  T(F): 97.4 (11-20-24 @ 12:29), Max: 98.1 (11-19-24 @ 17:18)    Vital Signs Last 24 Hrs  HR: 72 (11-20-24 @ 12:55) (61 - 75)  BP: 180/73 (11-20-24 @ 12:55) (161/68 - 196/94)  RR: 18 (11-20-24 @ 12:29)  SpO2: 100% (11-20-24 @ 12:55) (98% - 100%)  Wt(kg): --                          12.6   7.36  )-----------( 167      ( 19 Nov 2024 22:11 )             36.7       11-19    138  |  103  |  25[H]  ----------------------------<  196[H]  5.2   |  22  |  0.97    Ca    9.3      19 Nov 2024 18:47    TPro  8.0  /  Alb  4.1  /  TBili  0.3  /  DBili  x   /  AST  51[H]  /  ALT  22  /  AlkPhos  71  11-19      Urinalysis Basic - ( 19 Nov 2024 18:47 )    Color: x / Appearance: x / SG: x / pH: x  Gluc: 196 mg/dL / Ketone: x  / Bili: x / Urobili: x   Blood: x / Protein: x / Nitrite: x   Leuk Esterase: x / RBC: x / WBC x   Sq Epi: x / Non Sq Epi: x / Bacteria: x        MICROBIOLOGY:  v  .Abscess  11-19-24 --  --    No polymorphonuclear cells seen per low power field  Few Gram positive cocci in pairs per oil power field                  RADIOLOGY:    Images independently visualized and reviewed personally,  findings as below    < from: [facility] Duplex Lower Ext Vein Scan, Bilat (11.20.24 @ 11:46) >  IMPRESSION:    There is a 3.7 cm vascularized left inguinal lymph node.    No evidence of deep venous thrombosis in either lower extremity.    < end of copied text >  < from: [facility] Physiol Extremity Lower 3+ Level, BI (11.20.24 @ 11:41) >    IMPRESSION:    The quality of this examination is adversely impacted by the   noncompressible nature of the diabetic arteries.    The right LILIA of 0.61 and the left TBI of 0.58 are slightly abnormal,   suggestive of disease affecting the small arteries of both feet.    < end of copied text >  < from: Xray Foot AP + Lateral + Oblique, Left (11.19.24 @ 20:38) >    IMPRESSION:Cortical osseous destruction of the distal fourth phalanx,   consistent with osteomyelitis.    < end of copied text >

## 2024-11-20 NOTE — CONSULT NOTE ADULT - SUBJECTIVE AND OBJECTIVE BOX
DATE OF SERVICE: 11-20-24 @ 16:21    CHIEF COMPLAINT:Patient is a 79y old  Male who presents with a chief complaint of left 4th toe redness, pain, swelling (20 Nov 2024 14:15)      HISTORY OF PRESENT ILLNESS:HPI:  79M c hx CAD s/p ?1vCABG (15 yrs ago @ [facility]) c/b chronic b/l LE edema of unclear etiol, osteomyelitis s/p left 2nd toe partial amputation, denies HTN, denies DM2 (A1c 6.1 Nov '19, A1c 9.1 May '19), pw 1 day of left 4th toe redness, pain, swelling.    pt is very functional, goes to gym 3 times a week. denies hx of peripheral neuropathy. Pt states being in usual state of health until yesterday, when pt noticed a red spot on left 4th toe. Pt thought it might be his toenail that cut into him, put bacitracin on it, went to sleep. Today, noticed that left 4th toe was significantly more red, swollen, and having sensation of needle sticking in his toe. (19 Nov 2024 23:55)      PAST MEDICAL & SURGICAL HISTORY:  Benign essential HTN      CAD S/P percutaneous coronary angioplasty      Diabetes      Hypertension      Coronary artery disease      S/P CABG x 1      S/P CABG (coronary artery bypass graft)              MEDICATIONS:  aspirin enteric coated 81 milliGRAM(s) Oral daily  enoxaparin Injectable 40 milliGRAM(s) SubCutaneous every 24 hours  losartan 25 milliGRAM(s) Oral daily  metoprolol succinate ER 25 milliGRAM(s) Oral daily    piperacillin/tazobactam IVPB.. 3.375 Gram(s) IV Intermittent every 8 hours  vancomycin  IVPB 1250 milliGRAM(s) IV Intermittent every 12 hours                FAMILY HISTORY:  No pertinent family history in first degree relatives    Family history of diabetes mellitus in mother        Non-contributory    SOCIAL HISTORY:    [ ] Tobacco  [ ] Drugs  [ ] Alcohol    Allergies    No Known Allergies    Intolerances    	    REVIEW OF SYSTEMS:  CONSTITUTIONAL: No fever  EYES: No eye pain, visual disturbances, or discharge  ENMT:  No difficulty hearing, tinnitus  NECK: No pain or stiffness  RESPIRATORY: No cough, wheezing,  CARDIOVASCULAR: No chest pain, palpitations, passing out, dizziness, or leg swelling  GASTROINTESTINAL:  No nausea, vomiting, diarrhea or constipation. No melena.  GENITOURINARY: No dysuria, hematuria  NEUROLOGICAL: No stroke like symptoms  SKIN: No burning or lesions   ENDOCRINE: No heat or cold intolerance  MUSCULOSKELETAL: No joint pain or swelling  PSYCHIATRIC: No  anxiety, mood swings  HEME/LYMPH: No bleeding gums  ALLERGY AND IMMUNOLOGIC: No hives or eczema	    All other ROS negative    PHYSICAL EXAM:  T(C): 36.3 (11-20-24 @ 12:29), Max: 36.7 (11-19-24 @ 17:18)  HR: 72 (11-20-24 @ 12:55) (61 - 75)  BP: 180/73 (11-20-24 @ 12:55) (161/68 - 196/94)  RR: 18 (11-20-24 @ 12:29) (16 - 18)  SpO2: 100% (11-20-24 @ 12:55) (98% - 100%)  Wt(kg): --  I&O's Summary    20 Nov 2024 07:01  -  20 Nov 2024 16:21  --------------------------------------------------------  IN: 450 mL / OUT: 1 mL / NET: 449 mL        Appearance: Normal	  HEENT:   Normal oral mucosa, EOMI	  Cardiovascular:  S1 S2, No JVD,    Respiratory: Lungs clear to auscultation	  Psychiatry: Alert  Gastrointestinal:  Soft, Non-tender, + BS	  Skin: No rashes   Neurologic: Non-focal  Extremities:  No edema  Vascular: Peripheral pulses palpable    	    	  	  CARDIAC MARKERS:  Labs personally reviewed by me                                  12.6   7.36  )-----------( 167      ( 19 Nov 2024 22:11 )             36.7     11-19    138  |  103  |  25[H]  ----------------------------<  196[H]  5.2   |  22  |  0.97    Ca    9.3      19 Nov 2024 18:47    TPro  8.0  /  Alb  4.1  /  TBili  0.3  /  DBili  x   /  AST  51[H]  /  ALT  22  /  AlkPhos  71  11-19          EKG: Personally reviewed by me -   Radiology: Personally reviewed by me -       Assessment /Plan:     79M c hx CAD s/p ?1vCABG (15 yrs ago @ [facility]) c/b chronic b/l LE edema of unclear etiol, osteomyelitis s/p left 2nd toe partial amputation, denies HTN, denies DM2 (A1c 6.1 Nov '19, A1c 9.1 May '19), pw 1 day of left 4th toe redness, pain, swelling.    Problem/Plan #1:  Problem: Cardiac Risk Stratification  - Will review ECG  - No hx of tachy malgorzata arrhythmia  - No severe valvular dysfunction; TTE pending  - Further risk stratification pending review of TTE and ECG    Problem/Plan #2:  Problem: CAD  - s/p CABG   - c/w ASA (Repatha as OP)    Problem/Plan #3:  Problem: Hypertension  - c/w losartan 25mg PO daily  - c/w metoprolol 25mg PO daily    Problem/Plan #4:  Problem: LE Edema  - Check BNP  - TTE pending    Differential diagnosis and plan of care discussed with patient after the evaluation. Counseling on diet, nutritional counseling, weight management, exercise and medication compliance was done.   Advanced care planning/advanced directives discussed with patient/family. DNR status including forceful chest compressions to attempt to restart the heart, ventilator support/artificial breathing, electric shock, artificial nutrition, health care proxy, Molst form all discussed with pt. Pt wishes to consider. More than fifteen minutes spent on discussing advanced directives.       Rome Harmon DO [facility]  Cardiovascular Medicine  [address] [place] [address] Dr, [address]  Available for call or text via [employer]  Office [phone]   DATE OF SERVICE: 11-20-24 @ 16:21    CHIEF COMPLAINT:Patient is a 79y old  Male who presents with a chief complaint of left 4th toe redness, pain, swelling (20 Nov 2024 14:15)      HISTORY OF PRESENT ILLNESS:HPI:  79M c hx CAD s/p ?1vCABG (15 yrs ago @ [facility]) c/b chronic b/l LE edema of unclear etiol, osteomyelitis s/p left 2nd toe partial amputation, denies HTN, denies DM2 (A1c 6.1 Nov '19, A1c 9.1 May '19), pw 1 day of left 4th toe redness, pain, swelling.    pt is very functional, goes to gym 3 times a week. denies hx of peripheral neuropathy. Pt states being in usual state of health until yesterday, when pt noticed a red spot on left 4th toe. Pt thought it might be his toenail that cut into him, put bacitracin on it, went to sleep. Today, noticed that left 4th toe was significantly more red, swollen, and having sensation of needle sticking in his toe. (19 Nov 2024 23:55)      PAST MEDICAL & SURGICAL HISTORY:  Benign essential HTN      CAD S/P percutaneous coronary angioplasty      Diabetes      Hypertension      Coronary artery disease      S/P CABG x 1      S/P CABG (coronary artery bypass graft)              MEDICATIONS:  aspirin enteric coated 81 milliGRAM(s) Oral daily  enoxaparin Injectable 40 milliGRAM(s) SubCutaneous every 24 hours  losartan 25 milliGRAM(s) Oral daily  metoprolol succinate ER 25 milliGRAM(s) Oral daily    piperacillin/tazobactam IVPB.. 3.375 Gram(s) IV Intermittent every 8 hours  vancomycin  IVPB 1250 milliGRAM(s) IV Intermittent every 12 hours        FAMILY HISTORY:  No pertinent family history in first degree relatives    Family history of diabetes mellitus in mother        Non-contributory    SOCIAL HISTORY:    [ ] not a smoker  Allergies    No Known Allergies    Intolerances    	    REVIEW OF SYSTEMS:  CONSTITUTIONAL: No fever  EYES: No eye pain, visual disturbances, or discharge  ENMT:  No difficulty hearing, tinnitus  NECK: No pain or stiffness  RESPIRATORY: No cough, wheezing,  CARDIOVASCULAR: No chest pain, palpitations, passing out, dizziness, or leg swelling  GASTROINTESTINAL:  No nausea, vomiting, diarrhea or constipation. No melena.  GENITOURINARY: No dysuria, hematuria  NEUROLOGICAL: No stroke like symptoms  SKIN: No burning or lesions   ENDOCRINE: No heat or cold intolerance  MUSCULOSKELETAL: No joint pain or swelling  PSYCHIATRIC: No  anxiety, mood swings  HEME/LYMPH: No bleeding gums  ALLERGY AND IMMUNOLOGIC: No hives or eczema	    All other ROS negative    PHYSICAL EXAM:  T(C): 36.3 (11-20-24 @ 12:29), Max: 36.7 (11-19-24 @ 17:18)  HR: 72 (11-20-24 @ 12:55) (61 - 75)  BP: 180/73 (11-20-24 @ 12:55) (161/68 - 196/94)  RR: 18 (11-20-24 @ 12:29) (16 - 18)  SpO2: 100% (11-20-24 @ 12:55) (98% - 100%)  Wt(kg): --  I&O's Summary    20 Nov 2024 07:01  -  20 Nov 2024 16:21  --------------------------------------------------------  IN: 450 mL / OUT: 1 mL / NET: 449 mL        Appearance: Normal	  HEENT:   Normal oral mucosa, EOMI	  Cardiovascular:  S1 S2, No JVD,    Respiratory: Lungs clear to auscultation	  Psychiatry: Alert  Gastrointestinal:  Soft, Non-tender, + BS	  Skin: No rashes   Neurologic: Non-focal  Extremities:  No edema  Vascular: Peripheral pulses palpable    	    	  	  CARDIAC MARKERS:  Labs personally reviewed by me                                  12.6   7.36  )-----------( 167      ( 19 Nov 2024 22:11 )             36.7     11-19    138  |  103  |  25[H]  ----------------------------<  196[H]  5.2   |  22  |  0.97    Ca    9.3      19 Nov 2024 18:47    TPro  8.0  /  Alb  4.1  /  TBili  0.3  /  DBili  x   /  AST  51[H]  /  ALT  22  /  AlkPhos  71  11-19          EKG: Personally reviewed by me -   Radiology: Personally reviewed by me -       Assessment /Plan:     79M c hx CAD s/p ?1vCABG (15 yrs ago @ [facility]) c/b chronic b/l LE edema of unclear etiol, osteomyelitis s/p left 2nd toe partial amputation, denies HTN, denies DM2 (A1c 6.1 Nov '19, A1c 9.1 May '19), pw 1 day of left 4th toe redness, pain, swelling.    Problem/Plan #1:  Problem: Cardiac Risk Stratification  - Will review ECG, pending  - Reports excellent functional capacity  - No tachy malgorzata arrhythmia  - No severe valvular dysfunction; TTE 2023 at [facility] unremarkable   - Further risk stratification pending review of ECG    Problem/Plan #2:  Problem: CAD  - s/p CABG   - c/w ASA (Repatha as OP)  - Cath 2023 @ [facility] with LIMA graft to mLAD patent, SVG to RPDA patent with occlusion distal to graft, severe native LCx disease not grafted     Problem/Plan #3:  Problem: Hypertension  - c/w losartan 25mg PO daily  - c/w metoprolol 25mg PO daily    Problem/Plan #4:  Problem: LE Edema  - Check BNP  - TTE pending          Differential diagnosis and plan of care discussed with patient after the evaluation. Counseling on diet, nutritional counseling, weight management, exercise and medication compliance was done.   Advanced care planning/advanced directives discussed with patient/family. DNR status including forceful chest compressions to attempt to restart the heart, ventilator support/artificial breathing, electric shock, artificial nutrition, health care proxy, Molst form all discussed with pt. Pt wishes to consider. More than fifteen minutes spent on discussing advanced directives.       Rome Harmon DO [facility]  Cardiovascular Medicine  [address] [place] [address] Dr, [address]  Available for call or text via [employer]  Office [phone]   DATE OF SERVICE: 11-20-24 @ 16:21    CHIEF COMPLAINT:Patient is a 79y old  Male who presents with a chief complaint of left 4th toe redness, pain, swelling (20 Nov 2024 14:15)      HISTORY OF PRESENT ILLNESS:HPI:  79M c hx CAD s/p ?1vCABG (15 yrs ago @ [facility]) c/b chronic b/l LE edema of unclear etiol, osteomyelitis s/p left 2nd toe partial amputation, denies HTN, denies DM2 (A1c 6.1 Nov '19, A1c 9.1 May '19), pw 1 day of left 4th toe redness, pain, swelling.    pt is very functional, goes to gym 3 times a week. denies hx of peripheral neuropathy. Pt states being in usual state of health until yesterday, when pt noticed a red spot on left 4th toe. Pt thought it might be his toenail that cut into him, put bacitracin on it, went to sleep. Today, noticed that left 4th toe was significantly more red, swollen, and having sensation of needle sticking in his toe. (19 Nov 2024 23:55)      PAST MEDICAL & SURGICAL HISTORY:  Benign essential HTN      CAD S/P percutaneous coronary angioplasty      Diabetes      Hypertension      Coronary artery disease      S/P CABG x 1      S/P CABG (coronary artery bypass graft)              MEDICATIONS:  aspirin enteric coated 81 milliGRAM(s) Oral daily  enoxaparin Injectable 40 milliGRAM(s) SubCutaneous every 24 hours  losartan 25 milliGRAM(s) Oral daily  metoprolol succinate ER 25 milliGRAM(s) Oral daily    piperacillin/tazobactam IVPB.. 3.375 Gram(s) IV Intermittent every 8 hours  vancomycin  IVPB 1250 milliGRAM(s) IV Intermittent every 12 hours        FAMILY HISTORY:  No pertinent family history in first degree relatives    Family history of diabetes mellitus in mother        Non-contributory    SOCIAL HISTORY:    [ ] not a smoker  Allergies    No Known Allergies    Intolerances    	    REVIEW OF SYSTEMS:  CONSTITUTIONAL: No fever  EYES: No eye pain, visual disturbances, or discharge  ENMT:  No difficulty hearing, tinnitus  NECK: No pain or stiffness  RESPIRATORY: No cough, wheezing,  CARDIOVASCULAR: No chest pain, palpitations, passing out, dizziness, or leg swelling  GASTROINTESTINAL:  No nausea, vomiting, diarrhea or constipation. No melena.  GENITOURINARY: No dysuria, hematuria  NEUROLOGICAL: No stroke like symptoms  SKIN: No burning or lesions   ENDOCRINE: No heat or cold intolerance  MUSCULOSKELETAL: No joint pain or swelling  PSYCHIATRIC: No  anxiety, mood swings  HEME/LYMPH: No bleeding gums  ALLERGY AND IMMUNOLOGIC: No hives or eczema	    All other ROS negative    PHYSICAL EXAM:  T(C): 36.3 (11-20-24 @ 12:29), Max: 36.7 (11-19-24 @ 17:18)  HR: 72 (11-20-24 @ 12:55) (61 - 75)  BP: 180/73 (11-20-24 @ 12:55) (161/68 - 196/94)  RR: 18 (11-20-24 @ 12:29) (16 - 18)  SpO2: 100% (11-20-24 @ 12:55) (98% - 100%)  Wt(kg): --  I&O's Summary    20 Nov 2024 07:01  -  20 Nov 2024 16:21  --------------------------------------------------------  IN: 450 mL / OUT: 1 mL / NET: 449 mL        Appearance: Normal	  HEENT:   Normal oral mucosa, EOMI	  Cardiovascular:  S1 S2, No JVD,    Respiratory: Lungs clear to auscultation	  Psychiatry: Alert  Gastrointestinal:  Soft, Non-tender, + BS	  Skin: No rashes   Neurologic: Non-focal  Extremities:  No edema  Vascular: Peripheral pulses palpable    	    	  	  CARDIAC MARKERS:  Labs personally reviewed by me                                  12.6   7.36  )-----------( 167      ( 19 Nov 2024 22:11 )             36.7     11-19    138  |  103  |  25[H]  ----------------------------<  196[H]  5.2   |  22  |  0.97    Ca    9.3      19 Nov 2024 18:47    TPro  8.0  /  Alb  4.1  /  TBili  0.3  /  DBili  x   /  AST  51[H]  /  ALT  22  /  AlkPhos  71  11-19          EKG: Personally reviewed by me -   Radiology: Personally reviewed by me -       Assessment /Plan:     79M c hx CAD s/p ?1vCABG (15 yrs ago @ [facility]) c/b chronic b/l LE edema of unclear etiol, osteomyelitis s/p left 2nd toe partial amputation, denies HTN, denies DM2 (A1c 6.1 Nov '19, A1c 9.1 May '19), pw 1 day of left 4th toe redness, pain, swelling.    Problem/Plan #1:  Problem: Cardiac Risk Stratification  - Reports excellent functional capacity  - No tachy malgorzata arrhythmia  - No severe valvular dysfunction; TTE 2023 at [facility] unremarkable   - EKG NSR RBBB  - Ok to proceed with podiatric surgery, no cardiac contraindication    Problem/Plan #2:  Problem: CAD  - s/p CABG   - c/w ASA (Repatha as OP)  - Cath 2023 @ [facility] with LIMA graft to mLAD patent, SVG to RPDA patent with occlusion distal to graft, severe native LCx disease not grafted     Problem/Plan #3:  Problem: Hypertension  - c/w losartan 25mg PO daily  - c/w metoprolol 25mg PO daily    Problem/Plan #4:  Problem: LE Edema  - Check BNP  - TTE EF 62%, no rwma          Differential diagnosis and plan of care discussed with patient after the evaluation. Counseling on diet, nutritional counseling, weight management, exercise and medication compliance was done.   Advanced care planning/advanced directives discussed with patient/family. DNR status including forceful chest compressions to attempt to restart the heart, ventilator support/artificial breathing, electric shock, artificial nutrition, health care proxy, Molst form all discussed with pt. Pt wishes to consider. More than fifteen minutes spent on discussing advanced directives.       Rome Harmon DO [facility]  Cardiovascular Medicine  [address] [place] [address] Dr, [address]  Available for call or text via [employer]  Office [phone]

## 2024-11-20 NOTE — PROGRESS NOTE ADULT - PROBLEM SELECTOR PLAN 1
- c/w vancomycin and zosyn, started 11/19  - MRI pending  - LILIA/PVR pending  - LE duplex pending  - bcx sent 11/19  - podiatry following  - ID consulted, 11/19

## 2024-11-20 NOTE — CONSULT NOTE ADULT - ASSESSMENT
79 m with DM, HTN, CAD s/p CABG, previous toe osteomyelitis s/p 2nd toe partial amp and 6 week course of cefepime 2019 ( pseudomonas infection) now p/w left 4th toe redness, pain, swelling.  afebrile, no WBC  exam showed L  plantar-distal 4th digit wound to bone, dactylitis and erythema globally, blistering noted dorsally, 1 cc purulent drainage  xray showed Cortical osseous destruction of the distal fourth phalanx, consistent with osteomyelitis.  wound cx with GPC in pairs    diabetic foot infection with 4th plantar distal wound to bone and purulence, cx with GPC in pairs, cellulitis and dactylitis  xray with distal 4th toe osteomyelitis    * follow the final wound cx  * foot MRI  * c/w vanco, zosyn and will adjust as per culture (if no MRSA will stop vanco)  * follow with podiatry for surgical intervention  * monitor CMP and vanco trough    The above assessment and plan was discussed with the primary team    Uma Ureña MD  contact on teams  After 5pm and on weekends call [phone]

## 2024-11-20 NOTE — PROGRESS NOTE ADULT - SUBJECTIVE AND OBJECTIVE BOX
no complaints.    GENERAL: No fevers, no chills.  EYES: No blurry vision,  No photophobia  ENT: No sore throat.  No dysphagia  Cardiovascular: No chest pain, palpitations, orthopnea  Pulmonary: No cough, no wheezing. No shortness of breath  Gastrointestinal: No abdominal pain, no diarrhea, no constipation.    Musculoskeletal: No weakness.  No myalgias.  Dermatology:  No rashes.  Neuro: No Headache.  No vertigo.  No dizziness.  Psych: No anxiety, no depression.  Denies suicidal thoughts.    MEDICATIONS  (STANDING):  aspirin enteric coated 81 milliGRAM(s) Oral daily  enoxaparin Injectable 40 milliGRAM(s) SubCutaneous every 24 hours  metoprolol succinate ER 25 milliGRAM(s) Oral daily  piperacillin/tazobactam IVPB.. 3.375 Gram(s) IV Intermittent every 8 hours  vancomycin  IVPB 1250 milliGRAM(s) IV Intermittent every 12 hours    MEDICATIONS  (PRN):    Vital Signs Last 24 Hrs  T(C): 36.4 (20 Nov 2024 05:00), Max: 36.7 (19 Nov 2024 17:18)  T(F): 97.5 (20 Nov 2024 05:00), Max: 98.1 (19 Nov 2024 17:18)  HR: 61 (20 Nov 2024 05:00) (61 - 75)  BP: 161/68 (20 Nov 2024 05:00) (161/68 - 196/94)  BP(mean): --  RR: 18 (20 Nov 2024 05:00) (16 - 18)  SpO2: 99% (20 Nov 2024 05:00) (98% - 100%)    Parameters below as of 20 Nov 2024 05:00  Patient On (Oxygen Delivery Method): room air    GENERAL: NAD  HEAD:  Atraumatic, Normocephalic  EYES: EOMI, PERRLA, conjunctiva and sclera clear  ENT: Pharynx not erythematous  PULMONARY: Clear to auscultation bilaterally; No wheeze  CARDIOVASCULAR: Regular rate and rhythm; No murmurs, rubs, or gallops  ABDOMEN: Soft, Nontender, Nondistended; Bowel sounds present  EXTREMITIES:  2+ Peripheral Pulses, No clubbing, cyanosis, or edema  MUSCULOSKELETAL: No calf tenderness  SKIN: warm and dry, No rashes or lesions    .  LABS:                         12.6   7.36  )-----------( 167      ( 19 Nov 2024 22:11 )             36.7     11-19    138  |  103  |  25[H]  ----------------------------<  196[H]  5.2   |  22  |  0.97    Ca    9.3      19 Nov 2024 18:47    TPro  8.0  /  Alb  4.1  /  TBili  0.3  /  DBili  x   /  AST  51[H]  /  ALT  22  /  AlkPhos  71  11-19      Urinalysis Basic - ( 19 Nov 2024 18:47 )    Color: x / Appearance: x / SG: x / pH: x  Gluc: 196 mg/dL / Ketone: x  / Bili: x / Urobili: x   Blood: x / Protein: x / Nitrite: x   Leuk Esterase: x / RBC: x / WBC x   Sq Epi: x / Non Sq Epi: x / Bacteria: x            RADIOLOGY, EKG & ADDITIONAL TESTS: Reviewed.

## 2024-11-20 NOTE — PATIENT PROFILE ADULT - FALL HARM RISK - UNIVERSAL INTERVENTIONS
Bed in lowest position, wheels locked, appropriate side rails in place/Call bell, personal items and telephone in reach/Instruct patient to call for assistance before getting out of bed or chair/Non-slip footwear when patient is out of bed/Freedom to call system/Physically safe environment - no spills, clutter or unnecessary equipment/Purposeful Proactive Rounding/Room/bathroom lighting operational, light cord in reach

## 2024-11-21 ENCOUNTER — RESULT REVIEW (OUTPATIENT)
Age: 79
End: 2024-11-21

## 2024-11-21 LAB
ANION GAP SERPL CALC-SCNC: 14 MMOL/L — SIGNIFICANT CHANGE UP (ref 5–17)
BUN SERPL-MCNC: 16 MG/DL — SIGNIFICANT CHANGE UP (ref 7–23)
CALCIUM SERPL-MCNC: 8.8 MG/DL — SIGNIFICANT CHANGE UP (ref 8.4–10.5)
CHLORIDE SERPL-SCNC: 104 MMOL/L — SIGNIFICANT CHANGE UP (ref 96–108)
CO2 SERPL-SCNC: 22 MMOL/L — SIGNIFICANT CHANGE UP (ref 22–31)
CREAT SERPL-MCNC: 0.93 MG/DL — SIGNIFICANT CHANGE UP (ref 0.5–1.3)
EGFR: 84 ML/MIN/1.73M2 — SIGNIFICANT CHANGE UP
GLUCOSE SERPL-MCNC: 148 MG/DL — HIGH (ref 70–99)
HCT VFR BLD CALC: 39.2 % — SIGNIFICANT CHANGE UP (ref 39–50)
HGB BLD-MCNC: 13.4 G/DL — SIGNIFICANT CHANGE UP (ref 13–17)
MCHC RBC-ENTMCNC: 31.5 PG — SIGNIFICANT CHANGE UP (ref 27–34)
MCHC RBC-ENTMCNC: 34.2 G/DL — SIGNIFICANT CHANGE UP (ref 32–36)
MCV RBC AUTO: 92 FL — SIGNIFICANT CHANGE UP (ref 80–100)
NRBC # BLD: 0 /100 WBCS — SIGNIFICANT CHANGE UP (ref 0–0)
PLATELET # BLD AUTO: 203 K/UL — SIGNIFICANT CHANGE UP (ref 150–400)
POTASSIUM SERPL-MCNC: 3.9 MMOL/L — SIGNIFICANT CHANGE UP (ref 3.5–5.3)
POTASSIUM SERPL-SCNC: 3.9 MMOL/L — SIGNIFICANT CHANGE UP (ref 3.5–5.3)
RBC # BLD: 4.26 M/UL — SIGNIFICANT CHANGE UP (ref 4.2–5.8)
RBC # FLD: 13.1 % — SIGNIFICANT CHANGE UP (ref 10.3–14.5)
SODIUM SERPL-SCNC: 140 MMOL/L — SIGNIFICANT CHANGE UP (ref 135–145)
VANCOMYCIN TROUGH SERPL-MCNC: 10.9 UG/ML — SIGNIFICANT CHANGE UP (ref 10–20)
VANCOMYCIN TROUGH SERPL-MCNC: 12.1 UG/ML — SIGNIFICANT CHANGE UP (ref 10–20)
WBC # BLD: 7.26 K/UL — SIGNIFICANT CHANGE UP (ref 3.8–10.5)
WBC # FLD AUTO: 7.26 K/UL — SIGNIFICANT CHANGE UP (ref 3.8–10.5)

## 2024-11-21 PROCEDURE — 99233 SBSQ HOSP IP/OBS HIGH 50: CPT

## 2024-11-21 PROCEDURE — 93356 MYOCRD STRAIN IMG SPCKL TRCK: CPT

## 2024-11-21 PROCEDURE — 71045 X-RAY EXAM CHEST 1 VIEW: CPT | Mod: 26

## 2024-11-21 PROCEDURE — 93306 TTE W/DOPPLER COMPLETE: CPT | Mod: 26

## 2024-11-21 PROCEDURE — 99232 SBSQ HOSP IP/OBS MODERATE 35: CPT | Mod: GC

## 2024-11-21 RX ORDER — LOSARTAN POTASSIUM 100 MG/1
25 TABLET, FILM COATED ORAL ONCE
Refills: 0 | Status: COMPLETED | OUTPATIENT
Start: 2024-11-21 | End: 2024-11-21

## 2024-11-21 RX ORDER — LOSARTAN POTASSIUM 100 MG/1
50 TABLET, FILM COATED ORAL DAILY
Refills: 0 | Status: DISCONTINUED | OUTPATIENT
Start: 2024-11-22 | End: 2024-11-22

## 2024-11-21 RX ADMIN — Medication 166.67 MILLIGRAM(S): at 22:52

## 2024-11-21 RX ADMIN — PIPERACILLIN SODIUM AND TAZOBACTAM SODIUM 25 GRAM(S): 4; .5 INJECTION, POWDER, LYOPHILIZED, FOR SOLUTION INTRAVENOUS at 10:27

## 2024-11-21 RX ADMIN — PIPERACILLIN SODIUM AND TAZOBACTAM SODIUM 25 GRAM(S): 4; .5 INJECTION, POWDER, LYOPHILIZED, FOR SOLUTION INTRAVENOUS at 03:05

## 2024-11-21 RX ADMIN — Medication 166.67 MILLIGRAM(S): at 06:03

## 2024-11-21 RX ADMIN — LOSARTAN POTASSIUM 25 MILLIGRAM(S): 100 TABLET, FILM COATED ORAL at 14:01

## 2024-11-21 RX ADMIN — METOPROLOL TARTRATE 25 MILLIGRAM(S): 100 TABLET, FILM COATED ORAL at 06:03

## 2024-11-21 RX ADMIN — ENOXAPARIN SODIUM 40 MILLIGRAM(S): 30 INJECTION SUBCUTANEOUS at 06:04

## 2024-11-21 RX ADMIN — Medication 81 MILLIGRAM(S): at 10:27

## 2024-11-21 RX ADMIN — LOSARTAN POTASSIUM 25 MILLIGRAM(S): 100 TABLET, FILM COATED ORAL at 06:03

## 2024-11-21 RX ADMIN — PIPERACILLIN SODIUM AND TAZOBACTAM SODIUM 25 GRAM(S): 4; .5 INJECTION, POWDER, LYOPHILIZED, FOR SOLUTION INTRAVENOUS at 17:48

## 2024-11-21 NOTE — CONSULT NOTE ADULT - CONSULT REQUESTED BY NAME
Dr Hutchins notified of patient arrival, c/o pain, contractions every 5 minutes, VE of 2 cm. Orders received to admit.   [place]

## 2024-11-21 NOTE — PROGRESS NOTE ADULT - ASSESSMENT
79M presents for left foot 4th digit wound  - Pt was seen and evaluated  - Afebrile, no leukocytosis  - Left foot plantar-distal 4th digit wound to bone, dactylitis and erythema globally, blistering noted dorsally, 1 cc purulent drainage, no malodor, no fluctuance, right foot no open wounds or signs of infection.  - Left foot X-ray: OM 4th distal phalanx, no gas  - Left foot MRI: 4th distal phalanx OM  - Left foot wound culture: Staph lugdunensis  - LILIA/PVR: LILIA 1.06, RTBI 0.61, LTBI 0.58  - ID recs, appreciated  - Recomend vascular consult  - Pod Plan: Left foot partial 4th digit amputation on Friday 11/22 at 9:30 with Dr. Ferguson pending [facility] recs  - Please document medial and cardiac clearance for podiatric surgery under anesthesia  - Discussed with attending    79M presents for left foot 4th digit wound  - Pt was seen and evaluated  - Afebrile, no leukocytosis  - Left foot plantar-distal 4th digit wound to bone, dactylitis and erythema globally, blistering noted dorsally, 1 cc purulent drainage, no malodor, no fluctuance, right foot no open wounds or signs of infection.  - Left foot X-ray: OM 4th distal phalanx, no gas  - Left foot MRI: 4th distal phalanx OM  - Left foot wound culture: Staph lugdunensis  - LILIA/PVR: LILIA 1.06, RTBI 0.61, LTBI 0.58  - ID recs, appreciated  - Recomend vascular consult  - Pod Plan: Left foot partial 4th digit amputation on Friday 11/22 at 9:30 with Dr. Ferguson pending [facility] recs  - Please document medial and cardiac clearance for podiatric surgery under anesthesia  - NPO after midnight, PO meds with sip of water  - CBC, BMP, PT/PTT/INR, Type & Screen, ABO in AM  - Discussed with attending

## 2024-11-21 NOTE — PROGRESS NOTE ADULT - SUBJECTIVE AND OBJECTIVE BOX
Patient is a 79y old  Male who presents with a chief complaint of left 4th toe redness, pain, swelling (20 Nov 2024 16:20)       INTERVAL HPI/OVERNIGHT EVENTS:  Patient seen and evaluated at bedside.  Pt is resting comfortable in NAD. Denies N/V/F/C.    Allergies    No Known Allergies    Intolerances        Vital Signs Last 24 Hrs  T(C): 36.6 (21 Nov 2024 05:15), Max: 37.3 (20 Nov 2024 20:48)  T(F): 97.8 (21 Nov 2024 05:15), Max: 99.1 (20 Nov 2024 20:48)  HR: 59 (21 Nov 2024 05:15) (59 - 72)  BP: 151/72 (21 Nov 2024 05:15) (151/72 - 193/72)  BP(mean): --  RR: 18 (21 Nov 2024 05:15) (18 - 18)  SpO2: 98% (21 Nov 2024 05:15) (98% - 100%)    Parameters below as of 21 Nov 2024 05:15  Patient On (Oxygen Delivery Method): room air        LABS:                        12.6   7.36  )-----------( 167      ( 19 Nov 2024 22:11 )             36.7     11-21    140  |  104  |  16  ----------------------------<  148[H]  3.9   |  22  |  0.93    Ca    8.8      21 Nov 2024 07:22    TPro  8.0  /  Alb  4.1  /  TBili  0.3  /  DBili  x   /  AST  51[H]  /  ALT  22  /  AlkPhos  71  11-19      Urinalysis Basic - ( 21 Nov 2024 07:22 )    Color: x / Appearance: x / SG: x / pH: x  Gluc: 148 mg/dL / Ketone: x  / Bili: x / Urobili: x   Blood: x / Protein: x / Nitrite: x   Leuk Esterase: x / RBC: x / WBC x   Sq Epi: x / Non Sq Epi: x / Bacteria: x      CAPILLARY BLOOD GLUCOSE          Lower Extremity Physical Exam:  Vascular: DP/PT 2/4, B/L, CFT <3 seconds B/L, Temperature gradient warm to cool, B/L.   Neuro: Epicritic sensation diminished to the level of digits, B/L.  Musculoskeletal/Ortho: 2019 s/p bilateral 2nd digit amputations  Skin: Left foot plantar-distal 4th digit wound to bone, dactylitis, erythema globally, blistering noted dorsally, scant purulent drainage, no malodor, no fluctuance, right foot no open wounds or signs of infection    RADIOLOGY & ADDITIONAL TESTS:  < from: MR Foot w/wo IV Cont, Left (11.20.24 @ 16:50) >  CC: [id number] EXAM:  MR FOOT WAW IC LT   ORDERED BY:  DEMARCUS MANZO     PROCEDURE DATE:  11/20/2024          INTERPRETATION:  MRI of the left foot with and without contrast    INDICATION: Concern for osteomyelitis.    COMPARISON: Radiograph of the left foot performed 11/19/2024.    TECHNIQUE: Multiplanar multisequence MRI of the left foot with and   without contrast.    Contrast: Gadavist. Administered: 10 cc. Discarded: 0 cc.    FINDINGS:    There is amputation at the level of the second toe proximal phalanx.    There is ulceration at the distal fourth toe plantar and distal surface   (series 7, image 26) with adjacent soft tissue edema. There is underlying   high STIR weighted signal and intermediate to low T1-weighted signal   within thedistal phalanx of the fourth toe with an indistinct cortex   consistent with osteomyelitis in the setting of ulceration. There is no   abscess.    There are additional multifocal areas of subchondral cystic changes/edema   felt to be related to osteoarthritis with examples at the head of the   first metatarsal. There is hallux valgus.    There is edema at the plantar surface of the second, third, and fourth   toes distally. There is mild edema within the distal bellies of the right   foot/forefoot.    There is no evidence of tendon tear, tendinosis or tenosynovitis.    IMPRESSION:    MRI of the left forefoot demonstrate sequela of osteomyelitis at the   fourth toe distal phalanx in the setting of ulceration. No abscess.    --- End of Report ---             LAILA GUTIERRES MD; Attending Radiologist  This document has been electronically signed. Nov 20 2024  5:20PM    < end of copied text >

## 2024-11-21 NOTE — PROGRESS NOTE ADULT - ASSESSMENT
79M c hx CAD s/p ?1vCABG (15 yrs ago @ [facility]) c/b chronic b/l LE edema of unclear etiol, osteomyelitis s/p left 2nd toe partial amputation, denies HTN, denies DM2 (A1c 6.1 Nov '19, A1c 9.1 May '19), pw 1 day of left 4th toe redness, pain, swelling.

## 2024-11-21 NOTE — PROGRESS NOTE ADULT - SUBJECTIVE AND OBJECTIVE BOX
denies pain.    GENERAL: No fevers, no chills.  EYES: No blurry vision,  No photophobia  ENT: No sore throat.  No dysphagia  Cardiovascular: No chest pain, palpitations, orthopnea  Pulmonary: No cough, no wheezing. No shortness of breath  Gastrointestinal: No abdominal pain, no diarrhea, no constipation.    Musculoskeletal: No weakness.  No myalgias.  Dermatology:  No rashes.  Neuro: No Headache.  No vertigo.  No dizziness.  Psych: No anxiety, no depression.  Denies suicidal thoughts.    MEDICATIONS  (STANDING):  aspirin enteric coated 81 milliGRAM(s) Oral daily  enoxaparin Injectable 40 milliGRAM(s) SubCutaneous every 24 hours  losartan 25 milliGRAM(s) Oral once  metoprolol succinate ER 25 milliGRAM(s) Oral daily  piperacillin/tazobactam IVPB.. 3.375 Gram(s) IV Intermittent every 8 hours  vancomycin  IVPB 1250 milliGRAM(s) IV Intermittent every 12 hours    MEDICATIONS  (PRN):    Vital Signs Last 24 Hrs  T(C): 36.5 (21 Nov 2024 11:24), Max: 37.3 (20 Nov 2024 20:48)  T(F): 97.7 (21 Nov 2024 11:24), Max: 99.1 (20 Nov 2024 20:48)  HR: 61 (21 Nov 2024 11:24) (59 - 65)  BP: 162/76 (21 Nov 2024 11:24) (151/72 - 193/72)  BP(mean): --  RR: 17 (21 Nov 2024 11:24) (17 - 18)  SpO2: 96% (21 Nov 2024 11:24) (96% - 99%)    Parameters below as of 21 Nov 2024 11:24  Patient On (Oxygen Delivery Method): room air    Vital Signs Last 24 Hrs  T(C): 36.5 (21 Nov 2024 11:24), Max: 37.3 (20 Nov 2024 20:48)  T(F): 97.7 (21 Nov 2024 11:24), Max: 99.1 (20 Nov 2024 20:48)  HR: 61 (21 Nov 2024 11:24) (59 - 65)  BP: 162/76 (21 Nov 2024 11:24) (151/72 - 193/72)  BP(mean): --  RR: 17 (21 Nov 2024 11:24) (17 - 18)  SpO2: 96% (21 Nov 2024 11:24) (96% - 99%)    Parameters below as of 21 Nov 2024 11:24  Patient On (Oxygen Delivery Method): room air    GENERAL: NAD  HEAD:  Atraumatic, Normocephalic  EYES: EOMI, PERRLA, conjunctiva and sclera clear  ENT: Pharynx not erythematous  PULMONARY: Clear to auscultation bilaterally; No wheeze  CARDIOVASCULAR: Regular rate and rhythm; No murmurs, rubs, or gallops  ABDOMEN: Soft, Nontender, Nondistended; Bowel sounds present  EXTREMITIES:  2+ Peripheral Pulses, No clubbing, cyanosis, or edema  MUSCULOSKELETAL: No calf tenderness  SKIN: warm and dry, No rashes or lesions    .  LABS:                         13.4   7.26  )-----------( 203      ( 21 Nov 2024 11:45 )             39.2     11-21    140  |  104  |  16  ----------------------------<  148[H]  3.9   |  22  |  0.93    Ca    8.8      21 Nov 2024 07:22    TPro  8.0  /  Alb  4.1  /  TBili  0.3  /  DBili  x   /  AST  51[H]  /  ALT  22  /  AlkPhos  71  11-19      Urinalysis Basic - ( 21 Nov 2024 07:22 )    Color: x / Appearance: x / SG: x / pH: x  Gluc: 148 mg/dL / Ketone: x  / Bili: x / Urobili: x   Blood: x / Protein: x / Nitrite: x   Leuk Esterase: x / RBC: x / WBC x   Sq Epi: x / Non Sq Epi: x / Bacteria: x            RADIOLOGY, EKG & ADDITIONAL TESTS: Reviewed.

## 2024-11-21 NOTE — CONSULT NOTE ADULT - REASON FOR ADMISSION
left 4th toe redness, pain, swelling

## 2024-11-21 NOTE — PROGRESS NOTE ADULT - PROBLEM SELECTOR PLAN 1
- plan for OR 11/22 pending vascular consult and cardiology clearance  - RCRI:   - c/w vancomycin and zosyn, started 11/19  - MRI of the left forefoot demonstrate sequela of osteomyelitis at the fourth toe distal phalanx in the setting of ulceration. No abscess.  - LILIA/PVR- right LILIA of 0.61 and the left TBI of 0.58 are slightly abnormal, suggestive of disease affecting the small arteries of both feet-- vascular surgery consulted   - LE duplex- There is a 3.7 cm vascularized left inguinal lymph node.No evidence of deep venous thrombosis in either lower extremity. (node likely reactive from infection)   - bcx 11/19 ngtd   - podiatry following  - ID following - plan for OR 11/22 pending vascular consult and cardiology clearance  - RCRI: 1 point, 6.0%, risk of major cardiac event; cardiology to medically clear patient  - c/w vancomycin and zosyn, started 11/19  - MRI of the left forefoot demonstrate sequela of osteomyelitis at the fourth toe distal phalanx in the setting of ulceration. No abscess.  - LILIA/PVR- right LILIA of 0.61 and the left TBI of 0.58 are slightly abnormal, suggestive of disease affecting the small arteries of both feet-- vascular surgery consulted   - LE duplex- There is a 3.7 cm vascularized left inguinal lymph node. No evidence of deep venous thrombosis in either lower extremity. (node likely reactive from infection)   - bcx 11/19 ngtd   - podiatry following  - ID following - plan for OR 11/22 pending vascular consult and cardiology clearance  - RCRI: 1 point, 6.0%, risk of major cardiac event- no further w/u necessary pre operatively for medical clearance  - c/w vancomycin and zosyn, started 11/19  - MRI of the left forefoot demonstrate sequela of osteomyelitis at the fourth toe distal phalanx in the setting of ulceration. No abscess.  - LILIA/PVR- right LILIA of 0.61 and the left TBI of 0.58 are slightly abnormal, suggestive of disease affecting the small arteries of both feet-- vascular surgery consulted   - LE duplex- There is a 3.7 cm vascularized left inguinal lymph node. No evidence of deep venous thrombosis in either lower extremity. (node likely reactive from infection)   - bcx 11/19 ngtd   - podiatry following  - ID following

## 2024-11-21 NOTE — CONSULT NOTE ADULT - SUBJECTIVE AND OBJECTIVE BOX
Surgery Consult Note  Attending: Philipp  Service: Vascular Surgery  [id number]/ [id number]      HPI: [id number]      PAST MEDICAL HISTORY:  PAST MEDICAL & SURGICAL HISTORY:  Benign essential HTN      CAD S/P percutaneous coronary angioplasty      Diabetes      Hypertension      Coronary artery disease      S/P CABG x 1      S/P CABG (coronary artery bypass graft)          ALLERGIES:  Allergies    No Known Allergies    Intolerances        SOCIAL HISTORY: Negative for tobacco, etoh, or drug use    FAMILY HISTORY:  FAMILY HISTORY:  No pertinent family history in first degree relatives    Family history of diabetes mellitus in mother        PHYSICAL EXAM:  General: NAD, resting comfortably  Pulmonary: nonlabored respirations  Cardiovascular:  Abdominal: soft, ND/NT, no organomegaly, no guarding or rebound tenderness  Extremities: WWP, normal strength, no clubbing/cyanosis/edema    Vascular Pulse Exam:      VITAL SIGNS:  Vital Signs Last 24 Hrs  T(C): 36.5 (21 Nov 2024 11:24), Max: 37.3 (20 Nov 2024 20:48)  T(F): 97.7 (21 Nov 2024 11:24), Max: 99.1 (20 Nov 2024 20:48)  HR: 61 (21 Nov 2024 11:24) (59 - 65)  BP: 162/76 (21 Nov 2024 11:24) (151/72 - 193/72)  BP(mean): --  RR: 17 (21 Nov 2024 11:24) (17 - 18)  SpO2: 96% (21 Nov 2024 11:24) (96% - 99%)    Parameters below as of 21 Nov 2024 11:24  Patient On (Oxygen Delivery Method): room air        I&O's Summary    20 Nov 2024 07:01  -  21 Nov 2024 07:00  --------------------------------------------------------  IN: 690 mL / OUT: 1 mL / NET: 689 mL    21 Nov 2024 07:01  -  21 Nov 2024 18:42  --------------------------------------------------------  IN: 600 mL / OUT: 500 mL / NET: 100 mL        LABS:                        13.4   7.26  )-----------( 203      ( 21 Nov 2024 11:45 )             39.2     11-21    140  |  104  |  16  ----------------------------<  148[H]  3.9   |  22  |  0.93    Ca    8.8      21 Nov 2024 07:22    TPro  8.0  /  Alb  4.1  /  TBili  0.3  /  DBili  x   /  AST  51[H]  /  ALT  22  /  AlkPhos  71  11-19      Urinalysis Basic - ( 21 Nov 2024 07:22 )    Color: x / Appearance: x / SG: x / pH: x  Gluc: 148 mg/dL / Ketone: x  / Bili: x / Urobili: x   Blood: x / Protein: x / Nitrite: x   Leuk Esterase: x / RBC: x / WBC x   Sq Epi: x / Non Sq Epi: x / Bacteria: x      CAPILLARY BLOOD GLUCOSE        LIVER FUNCTIONS - ( 19 Nov 2024 18:47 )  Alb: 4.1 g/dL / Pro: 8.0 g/dL / ALK PHOS: 71 U/L / ALT: 22 U/L / AST: 51 U/L / GGT: x             CULTURES:  Culture Results:   Few Staphylococcus lugdunensis  Rare Staphylococcus epidermidis (11-19 @ 22:27)  Culture Results:   No growth at 24 hours (11-19 @ 22:05)  Culture Results:   No growth at 24 hours (11-19 @ 21:50)      RADIOLOGY & ADDITIONAL STUDIES:          ASSESSMENT:      Plan:  - LILIA/PVRs w/ toe pressure****  - C/w AC*****  - Local wound care****  - C/w aspirin/Statin****      Plan Discussed with Vascular Surgery Fellow on behalf of ___    Vascular Surgery   [id number]/ [id number]             Surgery Consult Note  Attending: Philipp  Service: Vascular Surgery      HPI:79M c hx CAD s/p ?1vCABG (15 yrs ago @ [facility]) c/b chronic b/l LE edema of unclear etiol, osteomyelitis s/p left 2nd toe partial amputation, denies HTN, denies DM2 (A1c 6.1 Nov '19, A1c 9.1 May '19), pw 1 day of left 4th toe redness, pain, swelling.    PAST MEDICAL HISTORY:  PAST MEDICAL & SURGICAL HISTORY:  Benign essential HTN      CAD S/P percutaneous coronary angioplasty      Diabetes      Hypertension      Coronary artery disease      S/P CABG x 1      S/P CABG (coronary artery bypass graft)      ALLERGIES:  Allergies    No Known Allergies    Intolerances        SOCIAL HISTORY: Negative for tobacco, etoh, or drug use    FAMILY HISTORY:  FAMILY HISTORY:  No pertinent family history in first degree relatives    Family history of diabetes mellitus in mother        PHYSICAL EXAM:  General: NAD, resting comfortably  Pulmonary: nonlabored respirations  Cardiovascular: pulse present   Abdominal: soft, ND/NT  Extremities: appears warm and well perfused, RLE DP/PT pulse, LLE DP pulse      VITAL SIGNS:  Vital Signs Last 24 Hrs  T(C): 36.5 (21 Nov 2024 11:24), Max: 37.3 (20 Nov 2024 20:48)  T(F): 97.7 (21 Nov 2024 11:24), Max: 99.1 (20 Nov 2024 20:48)  HR: 61 (21 Nov 2024 11:24) (59 - 65)  BP: 162/76 (21 Nov 2024 11:24) (151/72 - 193/72)  BP(mean): --  RR: 17 (21 Nov 2024 11:24) (17 - 18)  SpO2: 96% (21 Nov 2024 11:24) (96% - 99%)    Parameters below as of 21 Nov 2024 11:24  Patient On (Oxygen Delivery Method): room air        I&O's Summary    20 Nov 2024 07:01  -  21 Nov 2024 07:00  --------------------------------------------------------  IN: 690 mL / OUT: 1 mL / NET: 689 mL    21 Nov 2024 07:01  -  21 Nov 2024 18:42  --------------------------------------------------------  IN: 600 mL / OUT: 500 mL / NET: 100 mL        LABS:                        13.4   7.26  )-----------( 203      ( 21 Nov 2024 11:45 )             39.2     11-21    140  |  104  |  16  ----------------------------<  148[H]  3.9   |  22  |  0.93    Ca    8.8      21 Nov 2024 07:22    TPro  8.0  /  Alb  4.1  /  TBili  0.3  /  DBili  x   /  AST  51[H]  /  ALT  22  /  AlkPhos  71  11-19      Urinalysis Basic - ( 21 Nov 2024 07:22 )    Color: x / Appearance: x / SG: x / pH: x  Gluc: 148 mg/dL / Ketone: x  / Bili: x / Urobili: x   Blood: x / Protein: x / Nitrite: x   Leuk Esterase: x / RBC: x / WBC x   Sq Epi: x / Non Sq Epi: x / Bacteria: x      CAPILLARY BLOOD GLUCOSE        LIVER FUNCTIONS - ( 19 Nov 2024 18:47 )  Alb: 4.1 g/dL / Pro: 8.0 g/dL / ALK PHOS: 71 U/L / ALT: 22 U/L / AST: 51 U/L / GGT: x             CULTURES:  Culture Results:   Few Staphylococcus lugdunensis  Rare Staphylococcus epidermidis (11-19 @ 22:27)  Culture Results:   No growth at 24 hours (11-19 @ 22:05)  Culture Results:   No growth at 24 hours (11-19 @ 21:50)      RADIOLOGY & ADDITIONAL STUDIES:          ASSESSMENT:      Plan:  - LILIA/PVRs w/ toe pressure****  - C/w AC*****  - Local wound care****  - C/w aspirin/Statin****      Plan Discussed with Vascular Surgery Fellow on behalf of ___    Vascular Surgery   [id number]/ [id number]

## 2024-11-21 NOTE — CONSULT NOTE ADULT - CONSULT REASON
Cardiac Risk Stratification and Optimization, Cardiac Disease Mgmt: CAD, HTN, B/L LE Edema
foot wound
left foot wound
foot osteomyelitis
What Type Of Note Output Would You Prefer (Optional)?: Bullet Format
How Severe Is Your Skin Lesion?: mild
Has Your Skin Lesion Been Treated?: not been treated
Is This A New Presentation, Or A Follow-Up?: Skin Lesions

## 2024-11-21 NOTE — CONSULT NOTE ADULT - ASSESSMENT
INCOMPLETE NOTE  79M c hx CAD s/p ?1vCABG (15 yrs ago @ [facility]) c/b chronic b/l LE edema of unclear etiol, osteomyelitis s/p left 2nd toe partial amputation, presents with L 4th toe wound. Vascular surgery consulted for recommendations as podiatry is planning for 4th digit amputation 11/22.     Recommendations   - LILIA/PVR reviewed. No additional vascular surgery intervention needed prior to podiatry procedure  - pain control PRN  - continue ASA/statin   - local wound care     Plan discussed with fellow on behalf of attending Dr. Segal    Vascular Surgery   [phone]

## 2024-11-21 NOTE — PROGRESS NOTE ADULT - ASSESSMENT
79 m with DM, HTN, CAD s/p CABG, previous toe osteomyelitis s/p 2nd toe partial amp and 6 week course of cefepime 2019 ( pseudomonas infection) now p/w left 4th toe redness, pain, swelling.  afebrile, no WBC  exam showed L  plantar-distal 4th digit wound to bone, dactylitis and erythema globally, blistering noted dorsally, 1 cc purulent drainage  xray showed Cortical osseous destruction of the distal fourth phalanx, consistent with osteomyelitis.  wound cx with GPC in pairs    diabetic foot infection with 4th plantar distal wound to bone and purulence, cx with GPC in pairs, cellulitis and dactylitis  xray with distal 4th toe osteomyelitis  MRI: sequela of osteomyelitis at the fourth toe distal phalanx in the setting of ulceration. No abscess.    * follow the final wound cx  * c/w vanco, zosyn and will adjust as per culture   * follow with podiatry for surgical intervention, plan for Friday  * monitor CMP and vanco trough    The above assessment and plan was discussed with the primary team    Uma Ureña MD  contact on teams  After 5pm and on weekends call [phone]

## 2024-11-21 NOTE — PROGRESS NOTE ADULT - SUBJECTIVE AND OBJECTIVE BOX
Follow Up:  diabetic foot infection and osteo    Interval History/ROS: pt afebrile, no cough or vomiting, MRI also osteo of 4th digit, plan for surgery           Allergies  No Known Allergies        ANTIMICROBIALS:  piperacillin/tazobactam IVPB.. 3.375 every 8 hours  vancomycin  IVPB 1250 every 12 hours      OTHER MEDS:  aspirin enteric coated 81 milliGRAM(s) Oral daily  enoxaparin Injectable 40 milliGRAM(s) SubCutaneous every 24 hours  metoprolol succinate ER 25 milliGRAM(s) Oral daily      Vital Signs Last 24 Hrs  T(C): 36.5 (21 Nov 2024 11:24), Max: 37.3 (20 Nov 2024 20:48)  T(F): 97.7 (21 Nov 2024 11:24), Max: 99.1 (20 Nov 2024 20:48)  HR: 61 (21 Nov 2024 11:24) (59 - 65)  BP: 162/76 (21 Nov 2024 11:24) (151/72 - 193/72)  BP(mean): --  RR: 17 (21 Nov 2024 11:24) (17 - 18)  SpO2: 96% (21 Nov 2024 11:24) (96% - 99%)    Parameters below as of 21 Nov 2024 11:24  Patient On (Oxygen Delivery Method): room air        Physical Exam:  General:    NAD, non toxic  Respiratory:   comfortable on RA  abd:   soft,  not tender  :     no eduardo  Musculoskeletal :  s/p L 2nd toe amp, 4th toe erythema and edema, plantar wound, some erythema above the ankle  Skin:    no rash  vascular: no phlebitis                          13.4   7.26  )-----------( 203      ( 21 Nov 2024 11:45 )             39.2       11-21    140  |  104  |  16  ----------------------------<  148[H]  3.9   |  22  |  0.93    Ca    8.8      21 Nov 2024 07:22    TPro  8.0  /  Alb  4.1  /  TBili  0.3  /  DBili  x   /  AST  51[H]  /  ALT  22  /  AlkPhos  71  11-19      Urinalysis Basic - ( 21 Nov 2024 07:22 )    Color: x / Appearance: x / SG: x / pH: x  Gluc: 148 mg/dL / Ketone: x  / Bili: x / Urobili: x   Blood: x / Protein: x / Nitrite: x   Leuk Esterase: x / RBC: x / WBC x   Sq Epi: x / Non Sq Epi: x / Bacteria: x        MICROBIOLOGY:  Vancomycin Level, Trough: 10.9 ug/mL (11-21-24 @ 07:21)  v  .Abscess  11-19-24   Few Staphylococcus lugdunensis  Rare Staphylococcus epidermidis  --    No polymorphonuclear cells seen per low power field  Few Gram positive cocci in pairs per oil power field      .Blood BLOOD  11-19-24   No growth at 24 hours  --  --      .Blood BLOOD  11-19-24   No growth at 24 hours  --  --                RADIOLOGY:  Images independently visualized and reviewed personally, findings as below  < from: Xray Chest 1 View- PORTABLE-Urgent (Xray Chest 1 View- PORTABLE-Urgent .) (11.21.24 @ 15:23) >  IMPRESSION: Clear lungs.    < end of copied text >  < from: MR Foot w/wo IV Cont, Left (11.20.24 @ 16:50) >  IMPRESSION:    MRI of the left forefoot demonstrate sequela of osteomyelitis at the   fourth toe distal phalanx in the setting of ulceration. No abscess.      < end of copied text >

## 2024-11-21 NOTE — PROGRESS NOTE ADULT - NSPROGADDITIONALINFOA_GEN_ALL_CORE
disposition: imaging pending, ID pending  discussed with acp    Cammy Hoffman D.O.  Division of Hospital Medicine  Available on MS Teams disposition: OR 11/22 vascular pending, cards clearance pending  discussed with acp    Cammy Hoffman D.O.  Division of Hospital Medicine  Available on MS Teams

## 2024-11-21 NOTE — PROGRESS NOTE ADULT - SUBJECTIVE AND OBJECTIVE BOX
DATE OF SERVICE: 11-21-24 @ 21:48    Patient is a 79y old  Male who presents with a chief complaint of left 4th toe redness, pain, swelling (21 Nov 2024 18:41)      INTERVAL HISTORY: feels ok    TELEMETRY Personally reviewed: no events  	  MEDICATIONS:  metoprolol succinate ER 25 milliGRAM(s) Oral daily        PHYSICAL EXAM:  T(C): 36.8 (11-21-24 @ 20:24), Max: 36.8 (11-21-24 @ 20:24)  HR: 69 (11-21-24 @ 20:24) (59 - 69)  BP: 156/70 (11-21-24 @ 20:24) (151/72 - 162/76)  RR: 18 (11-21-24 @ 20:24) (17 - 18)  SpO2: 97% (11-21-24 @ 20:24) (96% - 98%)  Wt(kg): --  I&O's Summary    20 Nov 2024 07:01  -  21 Nov 2024 07:00  --------------------------------------------------------  IN: 690 mL / OUT: 1 mL / NET: 689 mL    21 Nov 2024 07:01  -  21 Nov 2024 21:48  --------------------------------------------------------  IN: 600 mL / OUT: 500 mL / NET: 100 mL          Appearance: In no distress	  HEENT:    PERRL, EOMI	  Cardiovascular:  S1 S2, No JVD  Respiratory: Lungs clear to auscultation	  Gastrointestinal:  Soft, Non-tender, + BS	  Vascularature:  No edema of LE  Psychiatric: Appropriate affect   Neuro: no acute focal deficits                               13.4   7.26  )-----------( 203      ( 21 Nov 2024 11:45 )             39.2     11-21    140  |  104  |  16  ----------------------------<  148[H]  3.9   |  22  |  0.93    Ca    8.8      21 Nov 2024 07:22          Labs personally reviewed      Assessment /Plan:     79M c hx CAD s/p ?1vCABG (15 yrs ago @ [facility]) c/b chronic b/l LE edema of unclear etiol, osteomyelitis s/p left 2nd toe partial amputation, denies HTN, denies DM2 (A1c 6.1 Nov '19, A1c 9.1 May '19), pw 1 day of left 4th toe redness, pain, swelling.    Problem/Plan #1:  Problem: Cardiac Risk Stratification  - Reports excellent functional capacity  - No tachy malgorzata arrhythmia  - No severe valvular dysfunction; TTE 2023 at [facility] unremarkable   - EKG NSR RBBB  - Ok to proceed with podiatric surgery, no cardiac contraindication    Problem/Plan #2:  Problem: CAD  - s/p CABG   - c/w ASA (Repatha as OP)  - Cath 2023 @ [facility] with LIMA graft to mLAD patent, SVG to RPDA patent with occlusion distal to graft, severe native LCx disease not grafted     Problem/Plan #3:  Problem: Hypertension  - c/w losartan 25mg PO daily  - c/w metoprolol 25mg PO daily    Problem/Plan #4:  Problem: LE Edema  - resolved  - TTE EF 62%, no rwma          Rome Harmon DO [facility]  Cardiovascular Medicine  [address] [place] [address], [address] [facility]  Office: [phone]  Available via Text/call on Microsoft Teams

## 2024-11-22 ENCOUNTER — TRANSCRIPTION ENCOUNTER (OUTPATIENT)
Age: 79
End: 2024-11-22

## 2024-11-22 DIAGNOSIS — I10 ESSENTIAL (PRIMARY) HYPERTENSION: ICD-10-CM

## 2024-11-22 DIAGNOSIS — E11.9 TYPE 2 DIABETES MELLITUS WITHOUT COMPLICATIONS: ICD-10-CM

## 2024-11-22 LAB
-  CLINDAMYCIN: SIGNIFICANT CHANGE UP
-  CLINDAMYCIN: SIGNIFICANT CHANGE UP
-  ERYTHROMYCIN: SIGNIFICANT CHANGE UP
-  ERYTHROMYCIN: SIGNIFICANT CHANGE UP
-  GENTAMICIN: SIGNIFICANT CHANGE UP
-  GENTAMICIN: SIGNIFICANT CHANGE UP
-  OXACILLIN: SIGNIFICANT CHANGE UP
-  OXACILLIN: SIGNIFICANT CHANGE UP
-  PENICILLIN: SIGNIFICANT CHANGE UP
-  RIFAMPIN: SIGNIFICANT CHANGE UP
-  RIFAMPIN: SIGNIFICANT CHANGE UP
-  TETRACYCLINE: SIGNIFICANT CHANGE UP
-  TETRACYCLINE: SIGNIFICANT CHANGE UP
-  TRIMETHOPRIM/SULFAMETHOXAZOLE: SIGNIFICANT CHANGE UP
-  TRIMETHOPRIM/SULFAMETHOXAZOLE: SIGNIFICANT CHANGE UP
-  VANCOMYCIN: SIGNIFICANT CHANGE UP
-  VANCOMYCIN: SIGNIFICANT CHANGE UP
A1C WITH ESTIMATED AVERAGE GLUCOSE RESULT: 6.8 % — HIGH (ref 4–5.6)
ALBUMIN SERPL ELPH-MCNC: 3.5 G/DL — SIGNIFICANT CHANGE UP (ref 3.3–5)
ALP SERPL-CCNC: 63 U/L — SIGNIFICANT CHANGE UP (ref 40–120)
ALT FLD-CCNC: 12 U/L — SIGNIFICANT CHANGE UP (ref 10–45)
ANION GAP SERPL CALC-SCNC: 10 MMOL/L — SIGNIFICANT CHANGE UP (ref 5–17)
APTT BLD: 28.8 SEC — SIGNIFICANT CHANGE UP (ref 24.5–35.6)
AST SERPL-CCNC: 14 U/L — SIGNIFICANT CHANGE UP (ref 10–40)
BILIRUB SERPL-MCNC: 0.4 MG/DL — SIGNIFICANT CHANGE UP (ref 0.2–1.2)
BLD GP AB SCN SERPL QL: NEGATIVE — SIGNIFICANT CHANGE UP
BUN SERPL-MCNC: 19 MG/DL — SIGNIFICANT CHANGE UP (ref 7–23)
CALCIUM SERPL-MCNC: 8.5 MG/DL — SIGNIFICANT CHANGE UP (ref 8.4–10.5)
CHLORIDE SERPL-SCNC: 104 MMOL/L — SIGNIFICANT CHANGE UP (ref 96–108)
CO2 SERPL-SCNC: 22 MMOL/L — SIGNIFICANT CHANGE UP (ref 22–31)
CREAT SERPL-MCNC: 0.98 MG/DL — SIGNIFICANT CHANGE UP (ref 0.5–1.3)
EGFR: 78 ML/MIN/1.73M2 — SIGNIFICANT CHANGE UP
ERYTHROCYTE [SEDIMENTATION RATE] IN BLOOD: 41 MM/HR — HIGH (ref 0–20)
ESTIMATED AVERAGE GLUCOSE: 148 MG/DL — HIGH (ref 68–114)
GLUCOSE SERPL-MCNC: 161 MG/DL — HIGH (ref 70–99)
HCT VFR BLD CALC: 38.6 % — LOW (ref 39–50)
HGB BLD-MCNC: 13 G/DL — SIGNIFICANT CHANGE UP (ref 13–17)
INR BLD: 1.02 RATIO — SIGNIFICANT CHANGE UP (ref 0.85–1.16)
MAGNESIUM SERPL-MCNC: 2.2 MG/DL — SIGNIFICANT CHANGE UP (ref 1.6–2.6)
MCHC RBC-ENTMCNC: 31.6 PG — SIGNIFICANT CHANGE UP (ref 27–34)
MCHC RBC-ENTMCNC: 33.7 G/DL — SIGNIFICANT CHANGE UP (ref 32–36)
MCV RBC AUTO: 93.9 FL — SIGNIFICANT CHANGE UP (ref 80–100)
METHOD TYPE: SIGNIFICANT CHANGE UP
METHOD TYPE: SIGNIFICANT CHANGE UP
NRBC # BLD: 0 /100 WBCS — SIGNIFICANT CHANGE UP (ref 0–0)
PHOSPHATE SERPL-MCNC: 2.8 MG/DL — SIGNIFICANT CHANGE UP (ref 2.5–4.5)
PLATELET # BLD AUTO: 175 K/UL — SIGNIFICANT CHANGE UP (ref 150–400)
POTASSIUM SERPL-MCNC: 3.9 MMOL/L — SIGNIFICANT CHANGE UP (ref 3.5–5.3)
POTASSIUM SERPL-SCNC: 3.9 MMOL/L — SIGNIFICANT CHANGE UP (ref 3.5–5.3)
PROT SERPL-MCNC: 6.6 G/DL — SIGNIFICANT CHANGE UP (ref 6–8.3)
PROTHROM AB SERPL-ACNC: 11.7 SEC — SIGNIFICANT CHANGE UP (ref 9.9–13.4)
RBC # BLD: 4.11 M/UL — LOW (ref 4.2–5.8)
RBC # FLD: 12.9 % — SIGNIFICANT CHANGE UP (ref 10.3–14.5)
RH IG SCN BLD-IMP: POSITIVE — SIGNIFICANT CHANGE UP
SODIUM SERPL-SCNC: 136 MMOL/L — SIGNIFICANT CHANGE UP (ref 135–145)
WBC # BLD: 6.66 K/UL — SIGNIFICANT CHANGE UP (ref 3.8–10.5)
WBC # FLD AUTO: 6.66 K/UL — SIGNIFICANT CHANGE UP (ref 3.8–10.5)

## 2024-11-22 PROCEDURE — 99232 SBSQ HOSP IP/OBS MODERATE 35: CPT

## 2024-11-22 PROCEDURE — 88311 DECALCIFY TISSUE: CPT | Mod: 26

## 2024-11-22 PROCEDURE — 88305 TISSUE EXAM BY PATHOLOGIST: CPT | Mod: 26

## 2024-11-22 PROCEDURE — 73630 X-RAY EXAM OF FOOT: CPT | Mod: 26,LT

## 2024-11-22 PROCEDURE — 99233 SBSQ HOSP IP/OBS HIGH 50: CPT

## 2024-11-22 PROCEDURE — 88312 SPECIAL STAINS GROUP 1: CPT | Mod: 26

## 2024-11-22 DEVICE — SURGICEL 2 X 14": Type: IMPLANTABLE DEVICE | Site: LEFT | Status: FUNCTIONAL

## 2024-11-22 RX ORDER — HYDROMORPHONE HYDROCHLORIDE 2 MG/1
0.5 TABLET ORAL EVERY 4 HOURS
Refills: 0 | Status: DISCONTINUED | OUTPATIENT
Start: 2024-11-22 | End: 2024-11-25

## 2024-11-22 RX ORDER — OXYCODONE AND ACETAMINOPHEN 5; 325 MG/1; MG/1
1 TABLET ORAL EVERY 4 HOURS
Refills: 0 | Status: DISCONTINUED | OUTPATIENT
Start: 2024-11-22 | End: 2024-11-25

## 2024-11-22 RX ORDER — ONDANSETRON HYDROCHLORIDE 4 MG/1
4 TABLET, FILM COATED ORAL ONCE
Refills: 0 | Status: DISCONTINUED | OUTPATIENT
Start: 2024-11-22 | End: 2024-11-22

## 2024-11-22 RX ORDER — PIPERACILLIN SODIUM AND TAZOBACTAM SODIUM 4; .5 G/20ML; G/20ML
3.38 INJECTION, POWDER, LYOPHILIZED, FOR SOLUTION INTRAVENOUS EVERY 8 HOURS
Refills: 0 | Status: DISCONTINUED | OUTPATIENT
Start: 2024-11-22 | End: 2024-11-22

## 2024-11-22 RX ORDER — AMPICILLIN AND SULBACTAM 1; .5 G/1; G/1
INJECTION, POWDER, FOR SOLUTION INTRAVENOUS
Refills: 0 | Status: DISCONTINUED | OUTPATIENT
Start: 2024-11-22 | End: 2024-11-25

## 2024-11-22 RX ORDER — METOPROLOL TARTRATE 100 MG/1
25 TABLET, FILM COATED ORAL DAILY
Refills: 0 | Status: DISCONTINUED | OUTPATIENT
Start: 2024-11-22 | End: 2024-11-24

## 2024-11-22 RX ORDER — FENTANYL 12 UG/H
25 PATCH, EXTENDED RELEASE TRANSDERMAL
Refills: 0 | Status: DISCONTINUED | OUTPATIENT
Start: 2024-11-22 | End: 2024-11-22

## 2024-11-22 RX ORDER — AMPICILLIN AND SULBACTAM 1; .5 G/1; G/1
3 INJECTION, POWDER, FOR SOLUTION INTRAVENOUS EVERY 6 HOURS
Refills: 0 | Status: DISCONTINUED | OUTPATIENT
Start: 2024-11-23 | End: 2024-11-25

## 2024-11-22 RX ORDER — AMPICILLIN AND SULBACTAM 1; .5 G/1; G/1
3 INJECTION, POWDER, FOR SOLUTION INTRAVENOUS ONCE
Refills: 0 | Status: COMPLETED | OUTPATIENT
Start: 2024-11-22 | End: 2024-11-22

## 2024-11-22 RX ORDER — ACETAMINOPHEN 500MG 500 MG/1
650 TABLET, COATED ORAL EVERY 6 HOURS
Refills: 0 | Status: DISCONTINUED | OUTPATIENT
Start: 2024-11-22 | End: 2024-11-25

## 2024-11-22 RX ADMIN — METOPROLOL TARTRATE 25 MILLIGRAM(S): 100 TABLET, FILM COATED ORAL at 05:48

## 2024-11-22 RX ADMIN — Medication 81 MILLIGRAM(S): at 16:34

## 2024-11-22 RX ADMIN — AMPICILLIN AND SULBACTAM 200 GRAM(S): 1; .5 INJECTION, POWDER, FOR SOLUTION INTRAVENOUS at 16:34

## 2024-11-22 RX ADMIN — LOSARTAN POTASSIUM 50 MILLIGRAM(S): 100 TABLET, FILM COATED ORAL at 05:49

## 2024-11-22 RX ADMIN — PIPERACILLIN SODIUM AND TAZOBACTAM SODIUM 25 GRAM(S): 4; .5 INJECTION, POWDER, LYOPHILIZED, FOR SOLUTION INTRAVENOUS at 03:37

## 2024-11-22 NOTE — BRIEF OPERATIVE NOTE - COMMENTS
Patient is s/p left foot incision and drainage down to the level of bone  Low concern for residual bone infection, bone at proximal resection was hard and of good quality  Low concern for viability, adequate intraop bleeding   Closed with 3.0 Nylon  Pod plan:   Patient is stable for discharge pending clean bone margins showing no growth for 48 hours and final ID recs.

## 2024-11-22 NOTE — BRIEF OPERATIVE NOTE - ESTIMATED BLOOD LOSS
PULMONARY MEDICINE REFERRAL    Route of referral: [facility]    Referring MD: Dr. Espinosa    Requested MD:  Referral lists Dr. Medeiros but pt saw Dr. Feliz in 2022 so he would like to stick with Dr. Feliz.    Diagnosis: Shortness of breath [R06.02]   Chronic obstructive pulmonary disease, unspecified COPD type  (CMD) [J44.9]     Status of referral: Routine    Testing completed:   Full PFT, CXR, and Echocardiogram, 6 min walk      He does not need a new referral for follow up.       Schedulers please schedule Dr Feliz next available.       CANDICE Engel     10

## 2024-11-22 NOTE — DISCHARGE NOTE PROVIDER - NSDCFUSCHEDAPPT_GEN_ALL_CORE_FT
Rey Matamoros  [facility]  ORTHOSURG [address]  Scheduled Appointment: 12/09/2024

## 2024-11-22 NOTE — PROGRESS NOTE ADULT - PROBLEM SELECTOR PLAN 1
- plan for OR 11/22   - c/w vancomycin and zosyn, started 11/19  - MRI of the left forefoot demonstrate sequela of osteomyelitis at the fourth toe distal phalanx in the setting of ulceration. No abscess.  - LILIA/PVR- right LILIA of 0.61 and the left TBI of 0.58 are slightly abnormal, suggestive of disease affecting the small arteries of both feet-- vascular surgery consulted   - LE duplex- There is a 3.7 cm vascularized left inguinal lymph node. No evidence of deep venous thrombosis in either lower extremity. (node likely reactive from infection)   - bcx 11/19 ngtd   - podiatry following  - ID following

## 2024-11-22 NOTE — DISCHARGE NOTE PROVIDER - HOSPITAL COURSE
HPI:  79M c hx CAD s/p ?1vCABG (15 yrs ago @ [facility]) c/b chronic b/l LE edema of unclear etiol, osteomyelitis s/p left 2nd toe partial amputation, denies HTN, denies DM2 (A1c 6.1 Nov '19, A1c 9.1 May '19), pw 1 day of left 4th toe redness, pain, swelling.    pt is very functional, goes to gym 3 times a week. denies hx of peripheral neuropathy. Pt states being in usual state of health until yesterday, when pt noticed a red spot on left 4th toe. Pt thought it might be his toenail that cut into him, put bacitracin on it, went to sleep. Today, noticed that left 4th toe was significantly more red, swollen, and having sensation of needle sticking in his toe. (19 Nov 2024 23:55)    Hospital Course: Patient was admitted for further medical management. Xray showed cortical osseous destruction of the distal fourth phalanx, consistent with osteomyelitis. MRI noted with sequela of osteomyelitis at the fourth toe distal phalanx in the setting of ulceration. No abscess. Initial wound culture grew staph lugdunensis s/p deep I&D and left foot partial 4th digit amputation on 11/22, no suspicion for residual osteo, OR cx negative. ID was consulted recommending unasyn while in the hospital,  and complete a 7 day course post op with augmentin 875 bid      Discharge/Dispo/Med rec discussed with attending  ____. Patient medically cleared for discharge ____ with outpatient follow up     Important Medication Changes and Reason:    Active or Pending Issues Requiring Follow-up:    Advanced Directives:   [ ] Full code  [ ] DNR  [ ] Hospice    Discharge Diagnoses:         HPI:  79M c hx CAD s/p ?1vCABG (15 yrs ago @ [facility]) c/b chronic b/l LE edema of unclear etiol, osteomyelitis s/p left 2nd toe partial amputation, denies HTN, denies DM2 (A1c 6.1 Nov '19, A1c 9.1 May '19), pw 1 day of left 4th toe redness, pain, swelling.    pt is very functional, goes to gym 3 times a week. denies hx of peripheral neuropathy. Pt states being in usual state of health until yesterday, when pt noticed a red spot on left 4th toe. Pt thought it might be his toenail that cut into him, put bacitracin on it, went to sleep. Today, noticed that left 4th toe was significantly more red, swollen, and having sensation of needle sticking in his toe. (19 Nov 2024 23:55)    Hospital Course: Patient was admitted for further medical management. Xray showed cortical osseous destruction of the distal fourth phalanx, consistent with osteomyelitis. MRI noted with sequela of osteomyelitis at the fourth toe distal phalanx in the setting of ulceration. No abscess. Initial wound culture grew staph lugdunensis s/p deep I&D and left foot partial 4th digit amputation on 11/22, no suspicion for residual osteo, OR cx negative. ID was consulted recommending unasyn while in the hospital,  and complete a 7 day course post op with augmentin 875 bid      Discharge/Dispo/Med rec discussed with attending Dr. Huffman. Patient medically cleared for discharge to home with outpatient follow up     Important Medication Changes and Reason:    Active or Pending Issues Requiring Follow-up:    Advanced Directives:   [x] Full code  [ ] DNR  [ ] Hospice    Discharge Diagnoses:  1. Left food OM             HPI:  79M c hx CAD s/p ?1vCABG (15 yrs ago @ [facility]) c/b chronic b/l LE edema of unclear etiol, osteomyelitis s/p left 2nd toe partial amputation, denies HTN, denies DM2 (A1c 6.1 Nov '19, A1c 9.1 May '19), pw 1 day of left 4th toe redness, pain, swelling.    pt is very functional, goes to gym 3 times a week. denies hx of peripheral neuropathy. Pt states being in usual state of health until yesterday, when pt noticed a red spot on left 4th toe. Pt thought it might be his toenail that cut into him, put bacitracin on it, went to sleep. Today, noticed that left 4th toe was significantly more red, swollen, and having sensation of needle sticking in his toe. (19 Nov 2024 23:55)    Hospital Course: Patient was admitted for further medical management. Xray showed cortical osseous destruction of the distal fourth phalanx, consistent with osteomyelitis. MRI noted with sequela of osteomyelitis at the fourth toe distal phalanx in the setting of ulceration. No abscess. Initial wound culture grew staph lugdunensis s/p deep I&D and left foot partial 4th digit amputation on 11/22, no suspicion for residual osteo, OR cx negative. ID was consulted recommending unasyn while in the hospital. Podiatry requested an additional 7 days of augmentin.      Discharge/Dispo/Med rec discussed with attending Dr. Huffman. Patient medically cleared for discharge to home with outpatient follow up     Important Medication Changes and Reason: losartan, augmentin    Active or Pending Issues Requiring Follow-up:    Advanced Directives:   [x] Full code  [ ] DNR  [ ] Hospice    Discharge Diagnoses:  1. Left food OM  2. Hypertension

## 2024-11-22 NOTE — BRIEF OPERATIVE NOTE - SPECIMENS
Pathology: 1. Left foot 4th toe 2. Clean bone margins of the left 4th proximal phalanx head. Microbiology: 1. Clean bone margins of the left 4th proximal phalanx head.

## 2024-11-22 NOTE — DISCHARGE NOTE PROVIDER - CARE PROVIDERS DIRECT ADDRESSES
,[email or web address]

## 2024-11-22 NOTE — PROGRESS NOTE ADULT - SUBJECTIVE AND OBJECTIVE BOX
Podiatry Pager #: [phone]    Patient is a 79y old  Male who presents with a chief complaint of left 4th toe redness, pain, swelling (21 Nov 2024 18:41)      INTERVAL HPI/OVERNIGHT EVENTS:   Pt is scheduled for left foot partial 4th ray resection with Dr. Ferguson at 9:30AM. Patient is aware of procedure and is NPO since midnight.    MEDICATIONS  (STANDING):  aspirin enteric coated 81 milliGRAM(s) Oral daily  enoxaparin Injectable 40 milliGRAM(s) SubCutaneous every 24 hours  losartan 50 milliGRAM(s) Oral daily  metoprolol succinate ER 25 milliGRAM(s) Oral daily  piperacillin/tazobactam IVPB.. 3.375 Gram(s) IV Intermittent every 8 hours  vancomycin  IVPB 1250 milliGRAM(s) IV Intermittent every 12 hours    MEDICATIONS  (PRN):      Allergies    No Known Allergies    Intolerances        Vital Signs Last 24 Hrs  T(C): 36.6 (22 Nov 2024 04:51), Max: 36.8 (21 Nov 2024 20:24)  T(F): 97.8 (22 Nov 2024 04:51), Max: 98.2 (21 Nov 2024 20:24)  HR: 71 (22 Nov 2024 04:51) (61 - 71)  BP: 151/78 (22 Nov 2024 04:51) (151/78 - 162/76)  BP(mean): --  RR: 18 (22 Nov 2024 04:51) (17 - 18)  SpO2: 96% (22 Nov 2024 04:51) (96% - 97%)    Parameters below as of 22 Nov 2024 04:51  Patient On (Oxygen Delivery Method): room air        LABS:                        13.4   7.26  )-----------( 203      ( 21 Nov 2024 11:45 )             39.2     11-21    140  |  104  |  16  ----------------------------<  148[H]  3.9   |  22  |  0.93    Ca    8.8      21 Nov 2024 07:22        Urinalysis Basic - ( 21 Nov 2024 07:22 )    Color: x / Appearance: x / SG: x / pH: x  Gluc: 148 mg/dL / Ketone: x  / Bili: x / Urobili: x   Blood: x / Protein: x / Nitrite: x   Leuk Esterase: x / RBC: x / WBC x   Sq Epi: x / Non Sq Epi: x / Bacteria: x      CAPILLARY BLOOD GLUCOSE          RADIOLOGY & ADDITIONAL TESTS:    Plan:   To OR today at 9:30AM with Dr. Ferguson for left foot partial 4th ray resection.   CXR on sunrise.  EKG on sunrise.  Medical clearance pending and not documented in chart.  Consent signed and in chart.  Procedure was explained to patient in detail. All alternatives, risks and complications were discussed. All questions answered. Podiatry Pager #: [phone]    Patient is a 79y old  Male who presents with a chief complaint of left 4th toe redness, pain, swelling (21 Nov 2024 18:41)      INTERVAL HPI/OVERNIGHT EVENTS:   Pt is scheduled for left foot partial 4th ray resection with Dr. Ferguson at 9:30AM. Patient is aware of procedure and is NPO since midnight.    MEDICATIONS  (STANDING):  aspirin enteric coated 81 milliGRAM(s) Oral daily  enoxaparin Injectable 40 milliGRAM(s) SubCutaneous every 24 hours  losartan 50 milliGRAM(s) Oral daily  metoprolol succinate ER 25 milliGRAM(s) Oral daily  piperacillin/tazobactam IVPB.. 3.375 Gram(s) IV Intermittent every 8 hours  vancomycin  IVPB 1250 milliGRAM(s) IV Intermittent every 12 hours    MEDICATIONS  (PRN):      Allergies    No Known Allergies    Intolerances        Vital Signs Last 24 Hrs  T(C): 36.6 (22 Nov 2024 04:51), Max: 36.8 (21 Nov 2024 20:24)  T(F): 97.8 (22 Nov 2024 04:51), Max: 98.2 (21 Nov 2024 20:24)  HR: 71 (22 Nov 2024 04:51) (61 - 71)  BP: 151/78 (22 Nov 2024 04:51) (151/78 - 162/76)  BP(mean): --  RR: 18 (22 Nov 2024 04:51) (17 - 18)  SpO2: 96% (22 Nov 2024 04:51) (96% - 97%)    Parameters below as of 22 Nov 2024 04:51  Patient On (Oxygen Delivery Method): room air        LABS:                        13.4   7.26  )-----------( 203      ( 21 Nov 2024 11:45 )             39.2     11-21    140  |  104  |  16  ----------------------------<  148[H]  3.9   |  22  |  0.93    Ca    8.8      21 Nov 2024 07:22        Urinalysis Basic - ( 21 Nov 2024 07:22 )    Color: x / Appearance: x / SG: x / pH: x  Gluc: 148 mg/dL / Ketone: x  / Bili: x / Urobili: x   Blood: x / Protein: x / Nitrite: x   Leuk Esterase: x / RBC: x / WBC x   Sq Epi: x / Non Sq Epi: x / Bacteria: x      CAPILLARY BLOOD GLUCOSE          RADIOLOGY & ADDITIONAL TESTS:    Plan:   To OR today at 9:30AM with Dr. Ferguson for left foot partial 4th ray resection.   CXR on sunrise.  EKG on sunrise.  Medical clearance since 11/22 and documented in chart.  Consent signed and in chart.  Procedure was explained to patient in detail. All alternatives, risks and complications were discussed. All questions answered. Podiatry Pager #: [phone]    Patient is a 79y old  Male who presents with a chief complaint of left 4th toe redness, pain, swelling (21 Nov 2024 18:41)      INTERVAL HPI/OVERNIGHT EVENTS:   Pt is scheduled for left foot partial 4th ray resection with Dr. Ferguson at 9:30AM. Patient is aware of procedure and is NPO since midnight.    MEDICATIONS  (STANDING):  aspirin enteric coated 81 milliGRAM(s) Oral daily  enoxaparin Injectable 40 milliGRAM(s) SubCutaneous every 24 hours  losartan 50 milliGRAM(s) Oral daily  metoprolol succinate ER 25 milliGRAM(s) Oral daily  piperacillin/tazobactam IVPB.. 3.375 Gram(s) IV Intermittent every 8 hours  vancomycin  IVPB 1250 milliGRAM(s) IV Intermittent every 12 hours    MEDICATIONS  (PRN):      Allergies    No Known Allergies    Intolerances        Vital Signs Last 24 Hrs  T(C): 36.6 (22 Nov 2024 04:51), Max: 36.8 (21 Nov 2024 20:24)  T(F): 97.8 (22 Nov 2024 04:51), Max: 98.2 (21 Nov 2024 20:24)  HR: 71 (22 Nov 2024 04:51) (61 - 71)  BP: 151/78 (22 Nov 2024 04:51) (151/78 - 162/76)  BP(mean): --  RR: 18 (22 Nov 2024 04:51) (17 - 18)  SpO2: 96% (22 Nov 2024 04:51) (96% - 97%)    Parameters below as of 22 Nov 2024 04:51  Patient On (Oxygen Delivery Method): room air        LABS:                        13.4   7.26  )-----------( 203      ( 21 Nov 2024 11:45 )             39.2     11-21    140  |  104  |  16  ----------------------------<  148[H]  3.9   |  22  |  0.93    Ca    8.8      21 Nov 2024 07:22        Urinalysis Basic - ( 21 Nov 2024 07:22 )    Color: x / Appearance: x / SG: x / pH: x  Gluc: 148 mg/dL / Ketone: x  / Bili: x / Urobili: x   Blood: x / Protein: x / Nitrite: x   Leuk Esterase: x / RBC: x / WBC x   Sq Epi: x / Non Sq Epi: x / Bacteria: x      CAPILLARY BLOOD GLUCOSE          RADIOLOGY & ADDITIONAL TESTS:    Plan:   To OR today at 9:30AM with Dr. Ferguson for left foot partial 4th ray resection.   CXR on sunrise.  EKG on sunrise.  Cardiac clearance since 11/21 Medical clearance since 11/22 and documented in chart.  Consent signed and in chart.  Procedure was explained to patient in detail. All alternatives, risks and complications were discussed. All questions answered.

## 2024-11-22 NOTE — PROGRESS NOTE ADULT - SUBJECTIVE AND OBJECTIVE BOX
Follow Up:  diabetic foot infection and osteo    Interval History/ROS: pt afebrile, no cough or vomiting, went for surgery today and no suspicion for residual bone infection          Allergies  No Known Allergies        ANTIMICROBIALS:  piperacillin/tazobactam IVPB.. 3.375 every 8 hours      OTHER MEDS:  acetaminophen     Tablet .. 650 milliGRAM(s) Oral every 6 hours PRN  aspirin enteric coated 81 milliGRAM(s) Oral daily  HYDROmorphone  Injectable 0.5 milliGRAM(s) IV Push every 4 hours PRN  metoprolol succinate ER 25 milliGRAM(s) Oral daily  oxycodone    5 mG/acetaminophen 325 mG 1 Tablet(s) Oral every 4 hours PRN      Vital Signs Last 24 Hrs  T(C): 36.6 (22 Nov 2024 12:30), Max: 36.8 (21 Nov 2024 20:24)  T(F): 97.9 (22 Nov 2024 12:30), Max: 98.2 (21 Nov 2024 20:24)  HR: 58 (22 Nov 2024 12:30) (58 - 71)  BP: 158/64 (22 Nov 2024 12:30) (135/65 - 165/77)  BP(mean): 99 (22 Nov 2024 11:40) (99 - 103)  RR: 16 (22 Nov 2024 12:30) (14 - 18)  SpO2: 99% (22 Nov 2024 12:30) (96% - 100%)    Parameters below as of 22 Nov 2024 12:30  Patient On (Oxygen Delivery Method): room air        Physical Exam:  General:    NAD, non toxic  Respiratory:   comfortable on RA  abd:   soft,  not tender  :     no eduardo  Musculoskeletal :  s/p L 2nd toe amp, 4th toe I&D with dressing  Skin:    no rash  vascular: no phlebitis                        13.0   6.66  )-----------( 175      ( 22 Nov 2024 07:03 )             38.6       11-22    136  |  104  |  19  ----------------------------<  161[H]  3.9   |  22  |  0.98    Ca    8.5      22 Nov 2024 07:03  Phos  2.8     11-22  Mg     2.2     11-22    TPro  6.6  /  Alb  3.5  /  TBili  0.4  /  DBili  x   /  AST  14  /  ALT  12  /  AlkPhos  63  11-22      Urinalysis Basic - ( 22 Nov 2024 07:03 )    Color: x / Appearance: x / SG: x / pH: x  Gluc: 161 mg/dL / Ketone: x  / Bili: x / Urobili: x   Blood: x / Protein: x / Nitrite: x   Leuk Esterase: x / RBC: x / WBC x   Sq Epi: x / Non Sq Epi: x / Bacteria: x        MICROBIOLOGY:  Vancomycin Level, Trough: 12.1 ug/mL (11-21-24 @ 19:17)  v  .Abscess  11-19-24   Few Staphylococcus lugdunensis  Rare Staphylococcus epidermidis  --  Staphylococcus lugdunensis  Staphylococcus epidermidis      .Blood BLOOD  11-19-24   No growth at 48 Hours  --  --      .Blood BLOOD  11-19-24   No growth at 48 Hours  --  --                RADIOLOGY:  Images independently visualized and reviewed personally, findings as below  < from: Xray Foot AP + Lateral + Oblique, Left (11.22.24 @ 11:53) >  IMPRESSION: Status post amputation of the distal aspect of the proximal   second phalanx as well as at the base of the fourth middle phalanx.   Moderate first metatarsophalangeal joint osteoarthritis. Vascular   calcifications are present. Soft tissue edema is seen surrounding fourth   digit possibly postsurgical versus secondary to ulcer. Clinical   correlation is advised.    < end of copied text >  < from: MR Foot w/wo IV Cont, Left (11.20.24 @ 16:50) >  IMPRESSION:    MRI of the left forefoot demonstrate sequela of osteomyelitis at the   fourth toe distal phalanx in the setting of ulceration. No abscess.    < end of copied text >

## 2024-11-22 NOTE — DISCHARGE NOTE PROVIDER - PROVIDER TOKENS
PROVIDER:[TOKEN:[[id number]],FOLLOWUP:[1 week]] PROVIDER:[TOKEN:[[id number]:[1 week]],PROVIDER:[TOKEN:[[id number]],FOLLOWUP:[1 week]]

## 2024-11-22 NOTE — PROGRESS NOTE ADULT - SUBJECTIVE AND OBJECTIVE BOX
denies pain.    GENERAL: No fevers, no chills.  EYES: No blurry vision,  No photophobia  ENT: No sore throat.  No dysphagia  Cardiovascular: No chest pain, palpitations, orthopnea  Pulmonary: No cough, no wheezing. No shortness of breath  Gastrointestinal: No abdominal pain, no diarrhea, no constipation.    Musculoskeletal: No weakness.  No myalgias.  Dermatology:  No rashes.  Neuro: No Headache.  No vertigo.  No dizziness.  Psych: No anxiety, no depression.  Denies suicidal thoughts.    MEDICATIONS  (STANDING):    MEDICATIONS  (PRN):  fentaNYL    Injectable 25 MICROGram(s) IV Push every 5 minutes PRN Moderate Pain (4 - 6)  ondansetron Injectable 4 milliGRAM(s) IV Push once PRN Nausea and/or Vomiting    Vital Signs Last 24 Hrs  T(C): 36.6 (22 Nov 2024 09:44), Max: 36.8 (21 Nov 2024 20:24)  T(F): 97.9 (22 Nov 2024 09:18), Max: 98.2 (21 Nov 2024 20:24)  HR: 59 (22 Nov 2024 09:44) (59 - 71)  BP: 159/58 (22 Nov 2024 09:44) (151/78 - 159/58)  BP(mean): --  RR: 16 (22 Nov 2024 09:44) (16 - 18)  SpO2: 99% (22 Nov 2024 09:44) (96% - 99%)    Parameters below as of 22 Nov 2024 09:18  Patient On (Oxygen Delivery Method): room air    GENERAL: NAD  HEAD:  Atraumatic, Normocephalic  EYES: EOMI, PERRLA, conjunctiva and sclera clear  ENT: Pharynx not erythematous  PULMONARY: Clear to auscultation bilaterally; No wheeze  CARDIOVASCULAR: Regular rate and rhythm; No murmurs, rubs, or gallops  ABDOMEN: Soft, Nontender, Nondistended; Bowel sounds present  EXTREMITIES:  2+ Peripheral Pulses, No clubbing, cyanosis, or edema  MUSCULOSKELETAL: No calf tenderness  SKIN: warm and dry, No rashes or lesions    .  LABS:                         13.0   6.66  )-----------( 175      ( 22 Nov 2024 07:03 )             38.6     11-22    136  |  104  |  19  ----------------------------<  161[H]  3.9   |  22  |  0.98    Ca    8.5      22 Nov 2024 07:03  Phos  2.8     11-22  Mg     2.2     11-22    TPro  6.6  /  Alb  3.5  /  TBili  0.4  /  DBili  x   /  AST  14  /  ALT  12  /  AlkPhos  63  11-22    PT/INR - ( 22 Nov 2024 07:03 )   PT: 11.7 sec;   INR: 1.02 ratio         PTT - ( 22 Nov 2024 07:03 )  PTT:28.8 sec  Urinalysis Basic - ( 22 Nov 2024 07:03 )    Color: x / Appearance: x / SG: x / pH: x  Gluc: 161 mg/dL / Ketone: x  / Bili: x / Urobili: x   Blood: x / Protein: x / Nitrite: x   Leuk Esterase: x / RBC: x / WBC x   Sq Epi: x / Non Sq Epi: x / Bacteria: x            RADIOLOGY, EKG & ADDITIONAL TESTS: Reviewed.

## 2024-11-22 NOTE — DISCHARGE NOTE PROVIDER - NSDCFUADDAPPT_GEN_ALL_CORE_FT
Podiatry Discharge Instructions:  Follow up: Please follow up with Dr. Ferguson within 1 week of discharge from the hospital, please call [phone] for appointment and discuss that you recently were seen in the hospital.  Wound Care: Please leave your dressing clean dry intact until your follow up appointment.  Weight bearing: Please weight bear as tolerated in a surgical shoe.  Antibiotics: Please continue as instructed. APPTS ARE READY TO BE MADE: [X] YES    Best Family or Patient Contact (if needed):    Additional Information about above appointments (if needed):    1:   2:   3:     Other comments or requests:    APPTS ARE READY TO BE MADE: [X] YES    Best Family or Patient Contact (if needed):    Additional Information about above appointments (if needed):    1: follow up with your PCP/cardiology to manage your blood pressure.   2:   3:     Other comments or requests:    APPTS ARE READY TO BE MADE: [X] YES    Best Family or Patient Contact (if needed):    Additional Information about above appointments (if needed):    1: follow up with your PCP/cardiology to manage your blood pressure.   2:   3:     Other comments or requests:   Patient was outreached but did not answer. A voicemail was left for the patient to return our call.

## 2024-11-22 NOTE — DISCHARGE NOTE PROVIDER - ATTENDING DISCHARGE PHYSICAL EXAMINATION:
Vital Signs Last 24 Hrs  T(C): 36.6 (25 Nov 2024 08:21), Max: 36.8 (24 Nov 2024 16:17)  T(F): 97.9 (25 Nov 2024 08:21), Max: 98.2 (24 Nov 2024 16:17)  HR: 58 (25 Nov 2024 08:21) (58 - 63)  BP: 177/78 (25 Nov 2024 08:21) (165/74 - 183/79)  BP(mean): --  RR: 18 (25 Nov 2024 08:21) (18 - 18)  SpO2: 97% (25 Nov 2024 08:21) (97% - 99%)    Parameters below as of 25 Nov 2024 08:21  Patient On (Oxygen Delivery Method): room air    GENERAL: NAD, well-developed  HEAD:  Atraumatic, Normocephalic  EYES: EOMI, PERRLA, conjunctiva and sclera clear  NECK: Supple, No JVD  CHEST/LUNG: Clear to auscultation bilaterally; No wheeze  HEART: Regular rate and rhythm; No murmurs, rubs, or gallops  ABDOMEN: Soft, Nontender, Nondistended; Bowel sounds present  EXTREMITIES:  2+ Peripheral Pulses, No clubbing, cyanosis, or edema  PSYCH: AAOx3, appropriate affect  NEUROLOGY: non-focal, bass  SKIN: No rashes or lesions

## 2024-11-22 NOTE — BRIEF OPERATIVE NOTE - OPERATION/FINDINGS
Patient is s/p left foot incision and drainage down to the level of bone  Low concern for residual bone infection, bone at proximal resection was hard and of good quality  Low concern for viability, adequate intraop bleeding   Closed with 3.0 Nylon

## 2024-11-22 NOTE — PROGRESS NOTE ADULT - NSPROGADDITIONALINFOA_GEN_ALL_CORE
disposition: OR 11/22  discussed with acp    Cammy Hoffman D.O.  Division of Hospital Medicine  Available on MS Teams

## 2024-11-22 NOTE — DISCHARGE NOTE PROVIDER - NSDCMRMEDTOKEN_GEN_ALL_CORE_FT
aspirin 81 mg oral delayed release tablet: 1 tab(s) orally once a day  metoprolol succinate 25 mg oral capsule, extended release: 1 cap(s) orally once a day  Repatha 140 mg/mL subcutaneous solution: 140 milligram(s) subcutaneously every 2 weeks (next dose due 11/20 per patient)   acetaminophen 325 mg oral tablet: 2 tab(s) orally every 6 hours As needed Mild Pain (1 - 3)  amoxicillin-clavulanate 875 mg-125 mg oral tablet: 875 milligram(s) orally 2 times a day  aspirin 81 mg oral delayed release tablet: 1 tab(s) orally once a day  losartan 100 mg oral tablet: 1 tab(s) orally once a day  metoprolol succinate 50 mg oral tablet, extended release: 1 tab(s) orally once a day  Repatha 140 mg/mL subcutaneous solution: 140 milligram(s) subcutaneously every 2 weeks (next dose due 11/20 per patient)   acetaminophen 325 mg oral tablet: 2 tab(s) orally every 6 hours As needed Mild Pain (1 - 3)  amoxicillin-clavulanate 875 mg-125 mg oral tablet: 1 tab(s) orally 2 times a day  aspirin 81 mg oral delayed release tablet: 1 tab(s) orally once a day  losartan 100 mg oral tablet: 1 tab(s) orally once a day  metoprolol succinate 50 mg oral tablet, extended release: 1 tab(s) orally once a day  Repatha 140 mg/mL subcutaneous solution: 140 milligram(s) subcutaneously every 2 weeks (next dose due 11/20 per patient)

## 2024-11-22 NOTE — PROGRESS NOTE ADULT - ASSESSMENT
79M c hx CAD s/p ?1vCABG (15 yrs ago @ [facility]) c/b chronic b/l LE edema of unclear etiol, osteomyelitis s/p left 2nd toe partial amputation, denies HTN, denies DM2 (A1c 6.1 Nov '19, A1c 9.1 May '19), pw 1 day of left 4th toe redness, pain, swelling.

## 2024-11-22 NOTE — PROGRESS NOTE ADULT - ASSESSMENT
79 m with DM, HTN, CAD s/p CABG, previous toe osteomyelitis s/p 2nd toe partial amp and 6 week course of cefepime 2019 ( pseudomonas infection) now p/w left 4th toe redness, pain, swelling.  afebrile, no WBC  exam showed L  plantar-distal 4th digit wound to bone, dactylitis and erythema globally, blistering noted dorsally, 1 cc purulent drainage  xray showed Cortical osseous destruction of the distal fourth phalanx, consistent with osteomyelitis.  wound cx with GPC in pairs    diabetic foot infection with 4th plantar distal wound to bone and purulence, cx with GPC in pairs, cellulitis and dactylitis  xray with distal 4th toe osteomyelitis  MRI: sequela of osteomyelitis at the fourth toe distal phalanx in the setting of ulceration. No abscess.  initial wound cx staph lugdunensis s/p deep I&D 11/22, no suspicion for residual osteo    * follow the OR cx and path  * switch to unasyn 3 q 6, if OR cx negative then will complete a short course and switch to augmentin 875 bid on discharge   * follow with podiatry      The above assessment and plan was discussed with the primary team    Uma Ureña MD  contact on teams  After 5pm and on weekends call [phone]

## 2024-11-22 NOTE — DISCHARGE NOTE PROVIDER - CARE PROVIDER_API CALL
Jorge Luis Ferguson  Podiatric Medicine and Surgery  [address] [place] [address]  Phone: [phone]  Fax: [phone]  Follow Up Time: 1 week   Jorge Luis Ferguson  Podiatric Medicine and Surgery  [address] [place] [address]  Phone: [phone]  Fax: [phone]  Follow Up Time: 1 week    Rome Harmon  Cardiovascular Disease  [address] [place] [address], [address]  [place] [address]  Phone: [phone]  Fax: [phone]  Follow Up Time: 1 week

## 2024-11-22 NOTE — DISCHARGE NOTE PROVIDER - NSDCFUADDINST_GEN_ALL_CORE_FT
Podiatry Discharge Instructions:  Follow up: Please follow up with Dr. Ferguson within 1 week of discharge from the hospital, please call [phone] for appointment and discuss that you recently were seen in the hospital.  Wound Care: Please leave your dressing clean dry intact until your follow up appointment.  Weight bearing: Please weight bear as tolerated in a surgical shoe.  Antibiotics: Please continue as instructed.

## 2024-11-22 NOTE — PROGRESS NOTE ADULT - SUBJECTIVE AND OBJECTIVE BOX
DATE OF SERVICE: 11-22-24 @ 10:13    Patient is a 79y old  Male who presents with a chief complaint of left 4th toe redness, pain, swelling (22 Nov 2024 07:05)      INTERVAL HISTORY: OR with podiatry today    REVIEW OF SYSTEMS:  CONSTITUTIONAL: No weakness  EYES/ENT: No visual changes;  No throat pain   NECK: No pain or stiffness  RESPIRATORY: No cough, wheezing; No shortness of breath  CARDIOVASCULAR: No chest pain or palpitations  GASTROINTESTINAL: No abdominal  pain. No nausea, vomiting, or hematemesis  GENITOURINARY: No dysuria, frequency or hematuria  NEUROLOGICAL: No stroke like symptoms  SKIN: No rashes    TELEMETRY Personally reviewed:  	  MEDICATIONS:  losartan 50 milliGRAM(s) Oral daily  metoprolol succinate ER 25 milliGRAM(s) Oral daily        PHYSICAL EXAM:  T(C): 36.6 (11-22-24 @ 09:44), Max: 36.8 (11-21-24 @ 20:24)  HR: 59 (11-22-24 @ 09:44) (59 - 71)  BP: 159/58 (11-22-24 @ 09:44) (151/78 - 162/76)  RR: 16 (11-22-24 @ 09:44) (16 - 18)  SpO2: 99% (11-22-24 @ 09:44) (96% - 99%)  Wt(kg): --  I&O's Summary    21 Nov 2024 07:01  -  22 Nov 2024 07:00  --------------------------------------------------------  IN: 600 mL / OUT: 1000 mL / NET: -400 mL      Height (cm): 177.8 (11-22 @ 09:44)  Weight (kg): 94.3 (11-22 @ 09:44)  BMI (kg/m2): 29.8 (11-22 @ 09:44)  BSA (m2): 2.12 (11-22 @ 09:44)    Appearance: In no distress	  HEENT:    PERRL, EOMI	  Cardiovascular:  S1 S2, No JVD  Respiratory: Lungs clear to auscultation	  Gastrointestinal:  Soft, Non-tender, + BS	  Vascularature:  No edema of LE  Psychiatric: Appropriate affect   Neuro: no acute focal deficits                               13.0   6.66  )-----------( 175      ( 22 Nov 2024 07:03 )             38.6     11-22    136  |  104  |  19  ----------------------------<  161[H]  3.9   |  22  |  0.98    Ca    8.5      22 Nov 2024 07:03  Phos  2.8     11-22  Mg     2.2     11-22    TPro  6.6  /  Alb  3.5  /  TBili  0.4  /  DBili  x   /  AST  14  /  ALT  12  /  AlkPhos  63  11-22        Labs personally reviewed      ASSESSMENT/PLAN: 	      79M c hx CAD s/p ?1vCABG (15 yrs ago @ [facility]) c/b chronic b/l LE edema of unclear etiol, osteomyelitis s/p left 2nd toe partial amputation, denies HTN, denies DM2 (A1c 6.1 Nov '19, A1c 9.1 May '19), pw 1 day of left 4th toe redness, pain, swelling.    Problem/Plan #1:  Problem: Cardiac Risk Stratification  - Reports excellent functional capacity  - No tachy malgorzata arrhythmia  - No severe valvular dysfunction; TTE 2023 at [facility] unremarkable   - EKG NSR RBBB  - Ok to proceed with podiatric surgery, no cardiac contraindication    Problem/Plan #2:  Problem: CAD  - s/p CABG   - c/w ASA (Repatha as OP)  - Cath 2023 @ [facility] with LIMA graft to mLAD patent, SVG to RPDA patent with occlusion distal to graft, severe native LCx disease not grafted     Problem/Plan #3:  Problem: Hypertension  - c/w losartan 25mg PO daily  - c/w metoprolol 25mg PO daily    Problem/Plan #4:  Problem: LE Edema  - resolved  - TTE EF 62%, no rwma          ALETA Markham DO [facility]  Cardiovascular Medicine  [address], [address]  Available through call or text on [employer] TE[employer]  Office: [phone]     DATE OF SERVICE: 11-22-24 @ 10:13    Patient is a 79y old  Male who presents with a chief complaint of left 4th toe redness, pain, swelling (22 Nov 2024 07:05)      INTERVAL HISTORY: OR with podiatry today    REVIEW OF SYSTEMS:  CONSTITUTIONAL: No weakness  EYES/ENT: No visual changes;  No throat pain   NECK: No pain or stiffness  RESPIRATORY: No cough, wheezing; No shortness of breath  CARDIOVASCULAR: No chest pain or palpitations  GASTROINTESTINAL: No abdominal  pain. No nausea, vomiting, or hematemesis  GENITOURINARY: No dysuria, frequency or hematuria  NEUROLOGICAL: No stroke like symptoms  SKIN: No rashes    TELEMETRY Personally reviewed:  	  MEDICATIONS:  losartan 50 milliGRAM(s) Oral daily  metoprolol succinate ER 25 milliGRAM(s) Oral daily        PHYSICAL EXAM:  T(C): 36.6 (11-22-24 @ 09:44), Max: 36.8 (11-21-24 @ 20:24)  HR: 59 (11-22-24 @ 09:44) (59 - 71)  BP: 159/58 (11-22-24 @ 09:44) (151/78 - 162/76)  RR: 16 (11-22-24 @ 09:44) (16 - 18)  SpO2: 99% (11-22-24 @ 09:44) (96% - 99%)  Wt(kg): --  I&O's Summary    21 Nov 2024 07:01  -  22 Nov 2024 07:00  --------------------------------------------------------  IN: 600 mL / OUT: 1000 mL / NET: -400 mL      Height (cm): 177.8 (11-22 @ 09:44)  Weight (kg): 94.3 (11-22 @ 09:44)  BMI (kg/m2): 29.8 (11-22 @ 09:44)  BSA (m2): 2.12 (11-22 @ 09:44)    Appearance: In no distress	  HEENT:    PERRL, EOMI	  Cardiovascular:  S1 S2, No JVD  Respiratory: Lungs clear to auscultation	  Gastrointestinal:  Soft, Non-tender, + BS	  Vascularature:  No edema of LE  Psychiatric: Appropriate affect   Neuro: no acute focal deficits                               13.0   6.66  )-----------( 175      ( 22 Nov 2024 07:03 )             38.6     11-22    136  |  104  |  19  ----------------------------<  161[H]  3.9   |  22  |  0.98    Ca    8.5      22 Nov 2024 07:03  Phos  2.8     11-22  Mg     2.2     11-22    TPro  6.6  /  Alb  3.5  /  TBili  0.4  /  DBili  x   /  AST  14  /  ALT  12  /  AlkPhos  63  11-22        Labs personally reviewed      ASSESSMENT/PLAN: 	    79M c hx CAD s/p ?1vCABG (15 yrs ago @ [facility]) c/b chronic b/l LE edema of unclear etiol, osteomyelitis s/p left 2nd toe partial amputation, denies HTN, denies DM2 (A1c 6.1 Nov '19, A1c 9.1 May '19), pw 1 day of left 4th toe redness, pain, swelling.    Problem/Plan #1:  Problem: Cardiac Risk Stratification  - Reports excellent functional capacity  - No tachy malgorzata arrhythmia  - No severe valvular dysfunction; TTE 2023 at [facility] unremarkable   - EKG NSR RBBB  - Ok to proceed with podiatric surgery, no cardiac contraindication    Problem/Plan #2:  Problem: CAD  - s/p CABG   - c/w ASA (Repatha as OP)  - Cath 2023 @ [facility] with LIMA graft to mLAD patent, SVG to RPDA patent with occlusion distal to graft, severe native LCx disease not grafted     Problem/Plan #3:  Problem: Hypertension  - c/w losartan 25mg PO daily  - c/w metoprolol 25mg PO daily    Problem/Plan #4:  Problem: LE Edema  - resolved  - TTE EF 62%, no rwma          ALETA Markham DO [facility]  Cardiovascular Medicine  [address], [address]  Available through call or text on [employer] TEAM[employer]  Office: [phone]

## 2024-11-22 NOTE — DISCHARGE NOTE PROVIDER - NSDCCPCAREPLAN_GEN_ALL_CORE_FT
PRINCIPAL DISCHARGE DIAGNOSIS  Diagnosis: Acute osteomyelitis  Assessment and Plan of Treatment: You had a partial ampitation of the left 4th toe on 11/22  Take all of your antibiotics as ordered. Follow podiatrist care instructions  Call your Health Care Provider within two days of arriving home to make a follow up appointment within one week.  If the affected cellulitic area increases in redness, warmth, pain or swelling call your Health Care Provider.  If you develop fever, chills, and/or malaise, call your Health Care Provider.       PRINCIPAL DISCHARGE DIAGNOSIS  Diagnosis: Acute osteomyelitis  Assessment and Plan of Treatment: You had a partial ampitation of the left 4th toe on 11/22  Take all of your antibiotics as ordered. Follow podiatrist care instructions  Call your Health Care Provider within two days of arriving home to make a follow up appointment within one week.  If the affected cellulitic area increases in redness, warmth, pain or swelling call your Health Care Provider.  If you develop fever, chills, and/or malaise, call your Health Care Provider.        SECONDARY DISCHARGE DIAGNOSES  Diagnosis: Hypertension  Assessment and Plan of Treatment: Your blood pressure is very high. We increased your losartan to 100 mg daily. Follow-up with your PCP or cardiologist in 3-7 days for further management of your blood pressure.

## 2024-11-23 LAB
ANION GAP SERPL CALC-SCNC: 12 MMOL/L — SIGNIFICANT CHANGE UP (ref 5–17)
BUN SERPL-MCNC: 19 MG/DL — SIGNIFICANT CHANGE UP (ref 7–23)
CALCIUM SERPL-MCNC: 8.4 MG/DL — SIGNIFICANT CHANGE UP (ref 8.4–10.5)
CHLORIDE SERPL-SCNC: 106 MMOL/L — SIGNIFICANT CHANGE UP (ref 96–108)
CO2 SERPL-SCNC: 24 MMOL/L — SIGNIFICANT CHANGE UP (ref 22–31)
CREAT SERPL-MCNC: 0.94 MG/DL — SIGNIFICANT CHANGE UP (ref 0.5–1.3)
EGFR: 82 ML/MIN/1.73M2 — SIGNIFICANT CHANGE UP
GLUCOSE SERPL-MCNC: 131 MG/DL — HIGH (ref 70–99)
HCT VFR BLD CALC: 37.7 % — LOW (ref 39–50)
HGB BLD-MCNC: 12.8 G/DL — LOW (ref 13–17)
MCHC RBC-ENTMCNC: 31.1 PG — SIGNIFICANT CHANGE UP (ref 27–34)
MCHC RBC-ENTMCNC: 34 G/DL — SIGNIFICANT CHANGE UP (ref 32–36)
MCV RBC AUTO: 91.7 FL — SIGNIFICANT CHANGE UP (ref 80–100)
NRBC # BLD: 0 /100 WBCS — SIGNIFICANT CHANGE UP (ref 0–0)
PLATELET # BLD AUTO: 186 K/UL — SIGNIFICANT CHANGE UP (ref 150–400)
POTASSIUM SERPL-MCNC: 3.8 MMOL/L — SIGNIFICANT CHANGE UP (ref 3.5–5.3)
POTASSIUM SERPL-SCNC: 3.8 MMOL/L — SIGNIFICANT CHANGE UP (ref 3.5–5.3)
RBC # BLD: 4.11 M/UL — LOW (ref 4.2–5.8)
RBC # FLD: 12.8 % — SIGNIFICANT CHANGE UP (ref 10.3–14.5)
SODIUM SERPL-SCNC: 142 MMOL/L — SIGNIFICANT CHANGE UP (ref 135–145)
WBC # BLD: 7.79 K/UL — SIGNIFICANT CHANGE UP (ref 3.8–10.5)
WBC # FLD AUTO: 7.79 K/UL — SIGNIFICANT CHANGE UP (ref 3.8–10.5)

## 2024-11-23 PROCEDURE — 99233 SBSQ HOSP IP/OBS HIGH 50: CPT

## 2024-11-23 RX ORDER — LOSARTAN POTASSIUM 100 MG/1
50 TABLET, FILM COATED ORAL ONCE
Refills: 0 | Status: COMPLETED | OUTPATIENT
Start: 2024-11-23 | End: 2024-11-23

## 2024-11-23 RX ORDER — LOSARTAN POTASSIUM 100 MG/1
50 TABLET, FILM COATED ORAL DAILY
Refills: 0 | Status: DISCONTINUED | OUTPATIENT
Start: 2024-11-23 | End: 2024-11-23

## 2024-11-23 RX ORDER — ENOXAPARIN SODIUM 30 MG/.3ML
40 INJECTION SUBCUTANEOUS EVERY 24 HOURS
Refills: 0 | Status: DISCONTINUED | OUTPATIENT
Start: 2024-11-23 | End: 2024-11-25

## 2024-11-23 RX ORDER — LOSARTAN POTASSIUM 100 MG/1
100 TABLET, FILM COATED ORAL DAILY
Refills: 0 | Status: DISCONTINUED | OUTPATIENT
Start: 2024-11-24 | End: 2024-11-25

## 2024-11-23 RX ADMIN — AMPICILLIN AND SULBACTAM 200 GRAM(S): 1; .5 INJECTION, POWDER, FOR SOLUTION INTRAVENOUS at 12:00

## 2024-11-23 RX ADMIN — Medication 81 MILLIGRAM(S): at 12:00

## 2024-11-23 RX ADMIN — AMPICILLIN AND SULBACTAM 200 GRAM(S): 1; .5 INJECTION, POWDER, FOR SOLUTION INTRAVENOUS at 23:34

## 2024-11-23 RX ADMIN — AMPICILLIN AND SULBACTAM 200 GRAM(S): 1; .5 INJECTION, POWDER, FOR SOLUTION INTRAVENOUS at 05:58

## 2024-11-23 RX ADMIN — METOPROLOL TARTRATE 25 MILLIGRAM(S): 100 TABLET, FILM COATED ORAL at 06:00

## 2024-11-23 RX ADMIN — AMPICILLIN AND SULBACTAM 200 GRAM(S): 1; .5 INJECTION, POWDER, FOR SOLUTION INTRAVENOUS at 00:14

## 2024-11-23 RX ADMIN — AMPICILLIN AND SULBACTAM 200 GRAM(S): 1; .5 INJECTION, POWDER, FOR SOLUTION INTRAVENOUS at 17:13

## 2024-11-23 RX ADMIN — LOSARTAN POTASSIUM 50 MILLIGRAM(S): 100 TABLET, FILM COATED ORAL at 08:20

## 2024-11-23 RX ADMIN — ENOXAPARIN SODIUM 40 MILLIGRAM(S): 30 INJECTION SUBCUTANEOUS at 08:20

## 2024-11-23 RX ADMIN — LOSARTAN POTASSIUM 50 MILLIGRAM(S): 100 TABLET, FILM COATED ORAL at 13:54

## 2024-11-23 NOTE — PROGRESS NOTE ADULT - ASSESSMENT
79M c hx CAD s/p ?1vCABG (15 yrs ago @ [facility]) c/b chronic b/l LE edema of unclear etiol, osteomyelitis s/p left 2nd toe partial amputation, denies HTN, denies DM2 (A1c 6.1 Nov '19, A1c 9.1 May '19), pw 1 day of left 4th toe redness, pain, swelling.

## 2024-11-23 NOTE — PHYSICAL THERAPY INITIAL EVALUATION ADULT - NS ASR WT BEARING DETAIL LLE
Per podiatry/weight-bearing as tolerated
Per podiatry heel weight bearing/weight-bearing as tolerated

## 2024-11-23 NOTE — PROGRESS NOTE ADULT - SUBJECTIVE AND OBJECTIVE BOX
DATE OF SERVICE: 11-23-24 @ 09:41    Patient is a 79y old  Male who presents with a chief complaint of left 4th toe redness, pain, swelling (22 Nov 2024 15:53)      INTERVAL HISTORY: Feels ok.     REVIEW OF SYSTEMS:  CONSTITUTIONAL: No weakness  EYES/ENT: No visual changes;  No throat pain   NECK: No pain or stiffness  RESPIRATORY: No cough, wheezing; No shortness of breath  CARDIOVASCULAR: No chest pain or palpitations  GASTROINTESTINAL: No abdominal  pain. No nausea, vomiting, or hematemesis  GENITOURINARY: No dysuria, frequency or hematuria  NEUROLOGICAL: No stroke like symptoms  SKIN: No rashes    	  MEDICATIONS:  losartan 50 milliGRAM(s) Oral daily  metoprolol succinate ER 25 milliGRAM(s) Oral daily        PHYSICAL EXAM:  T(C): 36.6 (11-23-24 @ 05:28), Max: 36.8 (11-23-24 @ 00:11)  HR: 67 (11-23-24 @ 08:10) (58 - 68)  BP: 171/77 (11-23-24 @ 08:10) (135/65 - 176/79)  RR: 18 (11-23-24 @ 05:28) (14 - 18)  SpO2: 98% (11-23-24 @ 05:28) (97% - 100%)  Wt(kg): --  I&O's Summary    22 Nov 2024 07:01  -  23 Nov 2024 07:00  --------------------------------------------------------  IN: 0 mL / OUT: 750 mL / NET: -750 mL      Height (cm): 177.8 (11-22 @ 09:44)  Weight (kg): 94.3 (11-22 @ 09:44)  BMI (kg/m2): 29.8 (11-22 @ 09:44)  BSA (m2): 2.12 (11-22 @ 09:44)    Appearance: In no distress	  HEENT:    PERRL, EOMI	  Cardiovascular:  S1 S2, No JVD  Respiratory: Lungs clear to auscultation	  Gastrointestinal:  Soft, Non-tender, + BS	  Vascularature:  No edema of LE  Psychiatric: Appropriate affect   Neuro: no acute focal deficits                               12.8   7.79  )-----------( 186      ( 23 Nov 2024 07:04 )             37.7     11-23    142  |  106  |  19  ----------------------------<  131[H]  3.8   |  24  |  0.94    Ca    8.4      23 Nov 2024 07:03  Phos  2.8     11-22  Mg     2.2     11-22    TPro  6.6  /  Alb  3.5  /  TBili  0.4  /  DBili  x   /  AST  14  /  ALT  12  /  AlkPhos  63  11-22        Labs personally reviewed      ASSESSMENT/PLAN: 	      79M c hx CAD s/p ?1vCABG (15 yrs ago @ [facility]) c/b chronic b/l LE edema of unclear etiol, osteomyelitis s/p left 2nd toe partial amputation, denies HTN, denies DM2 (A1c 6.1 Nov '19, A1c 9.1 May '19), pw 1 day of left 4th toe redness, pain, swelling.    Problem/Plan #1:  Problem: Cardiac Risk Stratification  - Reports excellent functional capacity  - No tachy malgorzata arrhythmia  - No severe valvular dysfunction; TTE 2023 at [facility] unremarkable   - EKG NSR RBBB  - Ok to proceed with podiatric surgery, no cardiac contraindication    Problem/Plan #2:  Problem: CAD  - s/p CABG   - c/w ASA (Repatha as OP)  - Cath 2023 @ [facility] with LIMA graft to mLAD patent, SVG to RPDA patent with occlusion distal to graft, severe native LCx disease not grafted     Problem/Plan #3:  Problem: Hypertension  - BP elevated. Will increase losartan to 50mg PO daily  - c/w metoprolol 25mg PO daily    Problem/Plan #4:  Problem: LE Edema  - resolved  - TTE EF 62%, no rwma        Yesenia Cavanaugh, AG-NP   Rome Harmon DO [facility]  Cardiovascular Medicine  [address], [address]  Available through call or text on [employer] TE[employer]  Office: [phone]   DATE OF SERVICE: 11-23-24 @ 09:41    Patient is a 79y old  Male who presents with a chief complaint of left 4th toe redness, pain, swelling (22 Nov 2024 15:53)      INTERVAL HISTORY: Feels ok.     REVIEW OF SYSTEMS:  CONSTITUTIONAL: No weakness  EYES/ENT: No visual changes;  No throat pain   NECK: No pain or stiffness  RESPIRATORY: No cough, wheezing; No shortness of breath  CARDIOVASCULAR: No chest pain or palpitations  GASTROINTESTINAL: No abdominal  pain. No nausea, vomiting, or hematemesis  GENITOURINARY: No dysuria, frequency or hematuria  NEUROLOGICAL: No stroke like symptoms  SKIN: No rashes    	  MEDICATIONS:  losartan 50 milliGRAM(s) Oral daily  metoprolol succinate ER 25 milliGRAM(s) Oral daily        PHYSICAL EXAM:  T(C): 36.6 (11-23-24 @ 05:28), Max: 36.8 (11-23-24 @ 00:11)  HR: 67 (11-23-24 @ 08:10) (58 - 68)  BP: 171/77 (11-23-24 @ 08:10) (135/65 - 176/79)  RR: 18 (11-23-24 @ 05:28) (14 - 18)  SpO2: 98% (11-23-24 @ 05:28) (97% - 100%)  Wt(kg): --  I&O's Summary    22 Nov 2024 07:01  -  23 Nov 2024 07:00  --------------------------------------------------------  IN: 0 mL / OUT: 750 mL / NET: -750 mL      Height (cm): 177.8 (11-22 @ 09:44)  Weight (kg): 94.3 (11-22 @ 09:44)  BMI (kg/m2): 29.8 (11-22 @ 09:44)  BSA (m2): 2.12 (11-22 @ 09:44)    Appearance: In no distress	  HEENT:    PERRL, EOMI	  Cardiovascular:  S1 S2, No JVD  Respiratory: Lungs clear to auscultation	  Gastrointestinal:  Soft, Non-tender, + BS	  Vascularature:  No edema of LE  Psychiatric: Appropriate affect   Neuro: no acute focal deficits                               12.8   7.79  )-----------( 186      ( 23 Nov 2024 07:04 )             37.7     11-23    142  |  106  |  19  ----------------------------<  131[H]  3.8   |  24  |  0.94    Ca    8.4      23 Nov 2024 07:03  Phos  2.8     11-22  Mg     2.2     11-22    TPro  6.6  /  Alb  3.5  /  TBili  0.4  /  DBili  x   /  AST  14  /  ALT  12  /  AlkPhos  63  11-22        Labs personally reviewed      ASSESSMENT/PLAN: 	    79M c hx CAD s/p ?1vCABG (15 yrs ago @ [facility]) c/b chronic b/l LE edema of unclear etiol, osteomyelitis s/p left 2nd toe partial amputation, denies HTN, denies DM2 (A1c 6.1 Nov '19, A1c 9.1 May '19), pw 1 day of left 4th toe redness, pain, swelling.    Problem/Plan #1:  Problem: Cardiac Risk Stratification  - Reports excellent functional capacity  - No tachy malgorzata arrhythmia  - No severe valvular dysfunction; TTE 2023 at [facility] unremarkable   - EKG NSR RBBB  - Ok to proceed with podiatric surgery, no cardiac contraindication    Problem/Plan #2:  Problem: CAD  - s/p CABG   - c/w ASA (Repatha as OP)  - Cath 2023 @ [facility] with LIMA graft to mLAD patent, SVG to RPDA patent with occlusion distal to graft, severe native LCx disease not grafted     Problem/Plan #3:  Problem: Hypertension  - BP elevated. Will increase losartan to 50mg PO daily  - c/w metoprolol 25mg PO daily    Problem/Plan #4:  Problem: LE Edema  - resolved  - TTE EF 62%, no rwma        Yesenia Cavanaugh, AG-NP   Rome Harmon DO [facility]  Cardiovascular Medicine  [address], [address]  Available through call or text on [employer] TEAM[employer]  Office: [phone]

## 2024-11-23 NOTE — PHYSICAL THERAPY INITIAL EVALUATION ADULT - PERTINENT HX OF CURRENT PROBLEM, REHAB EVAL
79M c hx CAD s/p ?1vCABG (15 yrs ago @ [facility]) c/b chronic b/l LE edema of unclear etiol, osteomyelitis s/p left 2nd toe partial amputation, denies HTN, denies DM2 (A1c 6.1 Nov '19, A1c 9.1 May '19), pw 1 day of left 4th toe redness, pain, swelling. Hosp course: XR L foot (11/19) Cortical osseous destruction of the distal fourth phalanx, consistent with osteomyelitis.
[de-identified] : Follow up abscess 
[FreeTextEntry6] : Manasa is a 16 y.o female presenting for f/u abscess and abnormal lab results i.e Ha1c. Abcess has been improving with less drainage, less tenderness  and no fever or other systemic symptoms. Still without an endocrinology appointment. 
79M c hx CAD s/p ?1vCABG (15 yrs ago @ [facility]) c/b chronic b/l LE edema of unclear etiol, osteomyelitis s/p left 2nd toe partial amputation, denies HTN, denies DM2 (A1c 6.1 Nov '19, A1c 9.1 May '19), pw 1 day of left 4th toe redness, pain, swelling. Hosp course: XR L foot (11/19) Cortical osseous destruction of the distal fourth phalanx, consistent with osteomyelitis. now s/p surgery mentioned above.

## 2024-11-23 NOTE — PROGRESS NOTE ADULT - SUBJECTIVE AND OBJECTIVE BOX
Patient is a 79y old  Male who presents with a chief complaint of left 4th toe redness, pain, swelling (23 Nov 2024 12:29)       INTERVAL HPI/OVERNIGHT EVENTS:  Patient seen and evaluated at bedside.  Pt is resting comfortable in NAD. Denies N/V/F/C.    Allergies    No Known Allergies    Intolerances        Vital Signs Last 24 Hrs  T(C): 36.7 (23 Nov 2024 11:05), Max: 36.8 (23 Nov 2024 00:11)  T(F): 98.1 (23 Nov 2024 11:05), Max: 98.3 (23 Nov 2024 00:11)  HR: 63 (23 Nov 2024 11:05) (58 - 67)  BP: 168/71 (23 Nov 2024 11:05) (161/72 - 176/79)  BP(mean): --  RR: 18 (23 Nov 2024 11:05) (18 - 18)  SpO2: 97% (23 Nov 2024 11:05) (97% - 98%)    Parameters below as of 23 Nov 2024 11:05  Patient On (Oxygen Delivery Method): room air        LABS:                        12.8   7.79  )-----------( 186      ( 23 Nov 2024 07:04 )             37.7     11-23    142  |  106  |  19  ----------------------------<  131[H]  3.8   |  24  |  0.94    Ca    8.4      23 Nov 2024 07:03  Phos  2.8     11-22  Mg     2.2     11-22    TPro  6.6  /  Alb  3.5  /  TBili  0.4  /  DBili  x   /  AST  14  /  ALT  12  /  AlkPhos  63  11-22    PT/INR - ( 22 Nov 2024 07:03 )   PT: 11.7 sec;   INR: 1.02 ratio         PTT - ( 22 Nov 2024 07:03 )  PTT:28.8 sec  Urinalysis Basic - ( 23 Nov 2024 07:03 )    Color: x / Appearance: x / SG: x / pH: x  Gluc: 131 mg/dL / Ketone: x  / Bili: x / Urobili: x   Blood: x / Protein: x / Nitrite: x   Leuk Esterase: x / RBC: x / WBC x   Sq Epi: x / Non Sq Epi: x / Bacteria: x      CAPILLARY BLOOD GLUCOSE          Lower Extremity Physical Exam:  Vascular: DP/PT 2/4, B/L, CFT <3 seconds B/L, Temperature gradient warm to cool, B/L.   Neuro: Epicritic sensation diminished to the level of digits, B/L.  Musculoskeletal/Ortho: 2019 s/p bilateral 2nd digit amputations  Skin: 11/22 s/p left foot partial 4th digit amputation, closed: sutures intact, mild periincision erythema, no dehiscence no underlying hematoma. Right foot no open wounds or signs of infection    RADIOLOGY & ADDITIONAL TESTS:

## 2024-11-23 NOTE — PROGRESS NOTE ADULT - SUBJECTIVE AND OBJECTIVE BOX
denies pain.    GENERAL: No fevers, no chills.  EYES: No blurry vision,  No photophobia  ENT: No sore throat.  No dysphagia  Cardiovascular: No chest pain, palpitations, orthopnea  Pulmonary: No cough, no wheezing. No shortness of breath  Gastrointestinal: No abdominal pain, no diarrhea, no constipation.    Musculoskeletal: No weakness.  No myalgias.  Dermatology:  No rashes.  Neuro: No Headache.  No vertigo.  No dizziness.  Psych: No anxiety, no depression.  Denies suicidal thoughts.    MEDICATIONS  (STANDING):  ampicillin/sulbactam  IVPB      ampicillin/sulbactam  IVPB 3 Gram(s) IV Intermittent every 6 hours  aspirin enteric coated 81 milliGRAM(s) Oral daily  enoxaparin Injectable 40 milliGRAM(s) SubCutaneous every 24 hours  losartan 50 milliGRAM(s) Oral daily  metoprolol succinate ER 25 milliGRAM(s) Oral daily    MEDICATIONS  (PRN):  acetaminophen     Tablet .. 650 milliGRAM(s) Oral every 6 hours PRN Mild Pain (1 - 3)  HYDROmorphone  Injectable 0.5 milliGRAM(s) IV Push every 4 hours PRN Severe Pain (7 - 10)  oxycodone    5 mG/acetaminophen 325 mG 1 Tablet(s) Oral every 4 hours PRN Moderate Pain (4 - 6)    Vital Signs Last 24 Hrs  T(C): 36.7 (23 Nov 2024 11:05), Max: 36.8 (23 Nov 2024 00:11)  T(F): 98.1 (23 Nov 2024 11:05), Max: 98.3 (23 Nov 2024 00:11)  HR: 63 (23 Nov 2024 11:05) (58 - 67)  BP: 168/71 (23 Nov 2024 11:05) (161/72 - 176/79)  BP(mean): --  RR: 18 (23 Nov 2024 11:05) (18 - 18)  SpO2: 97% (23 Nov 2024 11:05) (97% - 98%)    Parameters below as of 23 Nov 2024 11:05  Patient On (Oxygen Delivery Method): room air    GENERAL: NAD  HEAD:  Atraumatic, Normocephalic  EYES: EOMI, PERRLA, conjunctiva and sclera clear  ENT: Pharynx not erythematous  PULMONARY: Clear to auscultation bilaterally; No wheeze  CARDIOVASCULAR: Regular rate and rhythm; No murmurs, rubs, or gallops  ABDOMEN: Soft, Nontender, Nondistended; Bowel sounds present  EXTREMITIES:  2+ Peripheral Pulses, No clubbing, cyanosis, or edema  MUSCULOSKELETAL: No calf tenderness  SKIN: warm and dry, No rashes or lesions    .  LABS:                         12.8   7.79  )-----------( 186      ( 23 Nov 2024 07:04 )             37.7     11-23    142  |  106  |  19  ----------------------------<  131[H]  3.8   |  24  |  0.94    Ca    8.4      23 Nov 2024 07:03  Phos  2.8     11-22  Mg     2.2     11-22    TPro  6.6  /  Alb  3.5  /  TBili  0.4  /  DBili  x   /  AST  14  /  ALT  12  /  AlkPhos  63  11-22    PT/INR - ( 22 Nov 2024 07:03 )   PT: 11.7 sec;   INR: 1.02 ratio         PTT - ( 22 Nov 2024 07:03 )  PTT:28.8 sec  Urinalysis Basic - ( 23 Nov 2024 07:03 )    Color: x / Appearance: x / SG: x / pH: x  Gluc: 131 mg/dL / Ketone: x  / Bili: x / Urobili: x   Blood: x / Protein: x / Nitrite: x   Leuk Esterase: x / RBC: x / WBC x   Sq Epi: x / Non Sq Epi: x / Bacteria: x            RADIOLOGY, EKG & ADDITIONAL TESTS: Reviewed.

## 2024-11-23 NOTE — PROGRESS NOTE ADULT - ASSESSMENT
79M presents for left foot 4th digit wound  - Pt was seen and evaluated  - Afebrile, no leukocytosis  - 11/22 s/p left foot partial 4th digit amputation, closed: sutures intact, mild periincision erythema, no dehiscence no underlying hematoma. Right foot no open wounds or signs of infection  - Intraop findings: low concern for residual infection/ viability   - Recommend discharge 14 days of oral antibiotics if negative clean bone margin for soft tissue coverage, defer antibiotic choice to ID   - ID recs, appreciated   - Pod stable for discharge pending final ID recs  - Follow up information in discharge note provider   - Seen with attending

## 2024-11-23 NOTE — PROGRESS NOTE ADULT - NSPROGADDITIONALINFOA_GEN_ALL_CORE
disposition: pending OR cultures   discussed with acp    Cammy Hoffman D.O.  Division of Hospital Medicine  Available on MS Teams

## 2024-11-23 NOTE — PHYSICAL THERAPY INITIAL EVALUATION ADULT - ADDITIONAL COMMENTS
Pt resides with his spouse in a private home with +4 steps to enter. PTA, pt was independent with all mobility/ADLs without the use of DME. +
Pt resides with his spouse in a private home with +4 steps to enter. PTA, pt was independent with all mobility/ADLs without the use of DME. +

## 2024-11-23 NOTE — PROGRESS NOTE ADULT - PROBLEM SELECTOR PLAN 1
- s/p OR 11/22  - OR cultures pending    - started unasyn, 11/22 (s/p zosyn)-- may transition to augmentin at time of discharge pending OR cultures   - MRI of the left forefoot demonstrate sequela of osteomyelitis at the fourth toe distal phalanx in the setting of ulceration. No abscess.  - LILIA/PVR- right LILIA of 0.61 and the left TBI of 0.58 are slightly abnormal, suggestive of disease affecting the small arteries of both feet-- vascular surgery consulted   - LE duplex- There is a 3.7 cm vascularized left inguinal lymph node. No evidence of deep venous thrombosis in either lower extremity. (node likely reactive from infection)   - bcx 11/19 ngtd   - podiatry following  - ID following

## 2024-11-24 LAB
ANION GAP SERPL CALC-SCNC: 13 MMOL/L — SIGNIFICANT CHANGE UP (ref 5–17)
BUN SERPL-MCNC: 21 MG/DL — SIGNIFICANT CHANGE UP (ref 7–23)
CALCIUM SERPL-MCNC: 8.6 MG/DL — SIGNIFICANT CHANGE UP (ref 8.4–10.5)
CHLORIDE SERPL-SCNC: 106 MMOL/L — SIGNIFICANT CHANGE UP (ref 96–108)
CO2 SERPL-SCNC: 23 MMOL/L — SIGNIFICANT CHANGE UP (ref 22–31)
CREAT SERPL-MCNC: 0.91 MG/DL — SIGNIFICANT CHANGE UP (ref 0.5–1.3)
EGFR: 86 ML/MIN/1.73M2 — SIGNIFICANT CHANGE UP
GLUCOSE SERPL-MCNC: 128 MG/DL — HIGH (ref 70–99)
HCT VFR BLD CALC: 35.7 % — LOW (ref 39–50)
HGB BLD-MCNC: 12.4 G/DL — LOW (ref 13–17)
MAGNESIUM SERPL-MCNC: 2.2 MG/DL — SIGNIFICANT CHANGE UP (ref 1.6–2.6)
MCHC RBC-ENTMCNC: 31.5 PG — SIGNIFICANT CHANGE UP (ref 27–34)
MCHC RBC-ENTMCNC: 34.7 G/DL — SIGNIFICANT CHANGE UP (ref 32–36)
MCV RBC AUTO: 90.6 FL — SIGNIFICANT CHANGE UP (ref 80–100)
NRBC # BLD: 0 /100 WBCS — SIGNIFICANT CHANGE UP (ref 0–0)
PHOSPHATE SERPL-MCNC: 2.4 MG/DL — LOW (ref 2.5–4.5)
PLATELET # BLD AUTO: 176 K/UL — SIGNIFICANT CHANGE UP (ref 150–400)
POTASSIUM SERPL-MCNC: 3.6 MMOL/L — SIGNIFICANT CHANGE UP (ref 3.5–5.3)
POTASSIUM SERPL-SCNC: 3.6 MMOL/L — SIGNIFICANT CHANGE UP (ref 3.5–5.3)
RBC # BLD: 3.94 M/UL — LOW (ref 4.2–5.8)
RBC # FLD: 12.8 % — SIGNIFICANT CHANGE UP (ref 10.3–14.5)
SODIUM SERPL-SCNC: 142 MMOL/L — SIGNIFICANT CHANGE UP (ref 135–145)
WBC # BLD: 6.35 K/UL — SIGNIFICANT CHANGE UP (ref 3.8–10.5)
WBC # FLD AUTO: 6.35 K/UL — SIGNIFICANT CHANGE UP (ref 3.8–10.5)

## 2024-11-24 PROCEDURE — 99233 SBSQ HOSP IP/OBS HIGH 50: CPT

## 2024-11-24 RX ORDER — METOPROLOL TARTRATE 100 MG/1
25 TABLET, FILM COATED ORAL ONCE
Refills: 0 | Status: COMPLETED | OUTPATIENT
Start: 2024-11-24 | End: 2024-11-24

## 2024-11-24 RX ORDER — METOPROLOL TARTRATE 100 MG/1
50 TABLET, FILM COATED ORAL DAILY
Refills: 0 | Status: DISCONTINUED | OUTPATIENT
Start: 2024-11-24 | End: 2024-11-25

## 2024-11-24 RX ORDER — ACETAMINOPHEN, DIPHENHYDRAMINE HCL, PHENYLEPHRINE HCL 325; 25; 5 MG/1; MG/1; MG/1
3 TABLET ORAL AT BEDTIME
Refills: 0 | Status: DISCONTINUED | OUTPATIENT
Start: 2024-11-24 | End: 2024-11-25

## 2024-11-24 RX ORDER — SODIUM,POTASSIUM PHOSPHATES 278-250MG
1 POWDER IN PACKET (EA) ORAL ONCE
Refills: 0 | Status: COMPLETED | OUTPATIENT
Start: 2024-11-24 | End: 2024-11-24

## 2024-11-24 RX ADMIN — METOPROLOL TARTRATE 25 MILLIGRAM(S): 100 TABLET, FILM COATED ORAL at 08:28

## 2024-11-24 RX ADMIN — METOPROLOL TARTRATE 25 MILLIGRAM(S): 100 TABLET, FILM COATED ORAL at 05:49

## 2024-11-24 RX ADMIN — AMPICILLIN AND SULBACTAM 200 GRAM(S): 1; .5 INJECTION, POWDER, FOR SOLUTION INTRAVENOUS at 17:06

## 2024-11-24 RX ADMIN — AMPICILLIN AND SULBACTAM 200 GRAM(S): 1; .5 INJECTION, POWDER, FOR SOLUTION INTRAVENOUS at 22:52

## 2024-11-24 RX ADMIN — Medication 81 MILLIGRAM(S): at 11:18

## 2024-11-24 RX ADMIN — ENOXAPARIN SODIUM 40 MILLIGRAM(S): 30 INJECTION SUBCUTANEOUS at 08:29

## 2024-11-24 RX ADMIN — Medication 1 TABLET(S): at 12:41

## 2024-11-24 RX ADMIN — LOSARTAN POTASSIUM 100 MILLIGRAM(S): 100 TABLET, FILM COATED ORAL at 05:49

## 2024-11-24 RX ADMIN — AMPICILLIN AND SULBACTAM 200 GRAM(S): 1; .5 INJECTION, POWDER, FOR SOLUTION INTRAVENOUS at 05:50

## 2024-11-24 RX ADMIN — AMPICILLIN AND SULBACTAM 200 GRAM(S): 1; .5 INJECTION, POWDER, FOR SOLUTION INTRAVENOUS at 11:18

## 2024-11-24 NOTE — PROGRESS NOTE ADULT - ASSESSMENT
79M presents for left foot 4th digit wound  - Pt was seen and evaluated  - Afebrile, no leukocytosis  - 11/22 s/p left foot partial 4th digit amputation, closed: sutures intact, mild periincision erythema, no dehiscence no underlying hematoma. Right foot no open wounds or signs of infection  - Intraop findings: low concern for residual infection/ viability   - Recommend discharge 14 days of oral antibiotics if negative clean bone margin for soft tissue coverage, defer antibiotic choice to ID   - ID recs, appreciated   - Pod stable for discharge pending final ID recs  - Follow up information in discharge note provider   - Discussed with attending    79M presents for left foot 4th digit wound  - Pt was seen and evaluated  - Afebrile, no leukocytosis  - 11/22 s/p left foot partial 4th digit amputation, closed: sutures intact, mild periincision erythema, no dehiscence no underlying hematoma. Right foot no open wounds or signs of infection  - Intraop findings: low concern for residual infection/ viability   - Recommend discharge 14 days of oral antibiotics if negative clean bone margin for soft tissue coverage, defer antibiotic choice to ID   - ID recs, appreciated   - Pod stable for discharge pending final ID recs/ appearance   - Follow up information in discharge note provider   - Discussed with attending

## 2024-11-24 NOTE — PROGRESS NOTE ADULT - PROBLEM SELECTOR PLAN 1
- s/p OR 11/22  - OR cultures pending    - c/w unasyn, [id number] (s/p zosyn)-- transition to augmentin at time of discharge pending OR cultures   - per podiatry-- Intraop findings: low concern for residual infection/ viability. Recommend discharge 14 days of oral antibiotics if negative clean bone margin for soft tissue coverage  - MRI of the left forefoot demonstrate sequela of osteomyelitis at the fourth toe distal phalanx in the setting of ulceration. No abscess.  - LILIA/PVR- right LILIA of 0.61 and the left TBI of 0.58 are slightly abnormal, suggestive of disease affecting the small arteries of both feet-- vascular surgery consulted   - LE duplex- There is a 3.7 cm vascularized left inguinal lymph node. No evidence of deep venous thrombosis in either lower extremity. (node likely reactive from infection)   - bcx 11/19 ngtd   - podiatry following  - ID following, podiatry following  - no PT needs

## 2024-11-24 NOTE — PROGRESS NOTE ADULT - PROBLEM SELECTOR PLAN 4
- bp uncontrolled   - c/w losartan 100 mg po daily and increase metoprolol 50 mg po XR daily
- bp 150s  - c/w losartan 50 mg po daily and metoprolol 25 mg po daily
- bp uncontrolled   - increase losartan 100 mg po daily and metoprolol 25 mg po daily

## 2024-11-24 NOTE — PROGRESS NOTE ADULT - ASSESSMENT
79M c hx CAD s/p ?1vCABG (15 yrs ago @ [facility]) c/b chronic b/l LE edema of unclear etiol, osteomyelitis s/p left 2nd toe partial amputation, denies HTN, denies DM2 (A1c 6.1 Nov '19, A1c 9.1 May '19), pw 1 day of left 4th toe redness, pain, swelling.

## 2024-11-24 NOTE — PROGRESS NOTE ADULT - SUBJECTIVE AND OBJECTIVE BOX
denies pain.    GENERAL: No fevers, no chills.  EYES: No blurry vision,  No photophobia  ENT: No sore throat.  No dysphagia  Cardiovascular: No chest pain, palpitations, orthopnea  Pulmonary: No cough, no wheezing. No shortness of breath  Gastrointestinal: No abdominal pain, no diarrhea, no constipation.    Musculoskeletal: No weakness.  No myalgias.  Dermatology:  No rashes.  Neuro: No Headache.  No vertigo.  No dizziness.  Psych: No anxiety, no depression.  Denies suicidal thoughts.    MEDICATIONS  (STANDING):  ampicillin/sulbactam  IVPB      ampicillin/sulbactam  IVPB 3 Gram(s) IV Intermittent every 6 hours  aspirin enteric coated 81 milliGRAM(s) Oral daily  enoxaparin Injectable 40 milliGRAM(s) SubCutaneous every 24 hours  losartan 100 milliGRAM(s) Oral daily  metoprolol succinate ER 25 milliGRAM(s) Oral daily    MEDICATIONS  (PRN):  acetaminophen     Tablet .. 650 milliGRAM(s) Oral every 6 hours PRN Mild Pain (1 - 3)  HYDROmorphone  Injectable 0.5 milliGRAM(s) IV Push every 4 hours PRN Severe Pain (7 - 10)  oxycodone    5 mG/acetaminophen 325 mG 1 Tablet(s) Oral every 4 hours PRN Moderate Pain (4 - 6)    Vital Signs Last 24 Hrs  T(C): 36.7 (24 Nov 2024 05:08), Max: 36.8 (23 Nov 2024 20:20)  T(F): 98 (24 Nov 2024 05:08), Max: 98.2 (23 Nov 2024 20:20)  HR: 61 (24 Nov 2024 05:08) (61 - 77)  BP: 177/75 (24 Nov 2024 05:08) (152/65 - 188/76)  BP(mean): --  RR: 18 (24 Nov 2024 05:08) (18 - 18)  SpO2: 98% (24 Nov 2024 05:08) (97% - 98%)    Parameters below as of 24 Nov 2024 05:08  Patient On (Oxygen Delivery Method): room air    GENERAL: NAD  HEAD:  Atraumatic, Normocephalic  EYES: EOMI, PERRLA, conjunctiva and sclera clear  ENT: Pharynx not erythematous  PULMONARY: Clear to auscultation bilaterally; No wheeze  CARDIOVASCULAR: Regular rate and rhythm; No murmurs, rubs, or gallops  ABDOMEN: Soft, Nontender, Nondistended; Bowel sounds present  EXTREMITIES:  2+ Peripheral Pulses, No clubbing, cyanosis, or edema  MUSCULOSKELETAL: No calf tenderness  SKIN: warm and dry, No rashes or lesions    .  LABS:                         12.4   6.35  )-----------( 176      ( 24 Nov 2024 06:55 )             35.7     11-23    142  |  106  |  19  ----------------------------<  131[H]  3.8   |  24  |  0.94    Ca    8.4      23 Nov 2024 07:03        Urinalysis Basic - ( 23 Nov 2024 07:03 )    Color: x / Appearance: x / SG: x / pH: x  Gluc: 131 mg/dL / Ketone: x  / Bili: x / Urobili: x   Blood: x / Protein: x / Nitrite: x   Leuk Esterase: x / RBC: x / WBC x   Sq Epi: x / Non Sq Epi: x / Bacteria: x            RADIOLOGY, EKG & ADDITIONAL TESTS: Reviewed.

## 2024-11-24 NOTE — PROGRESS NOTE ADULT - TIME BILLING
plan of care, chart review

## 2024-11-24 NOTE — PROGRESS NOTE ADULT - SUBJECTIVE AND OBJECTIVE BOX
DATE OF SERVICE: 11-24-24 @ 12:15    Patient is a 79y old  Male who presents with a chief complaint of left 4th toe redness, pain, swelling (24 Nov 2024 09:42)      INTERVAL HISTORY: Feels ok.     REVIEW OF SYSTEMS:  CONSTITUTIONAL: No weakness  EYES/ENT: No visual changes;  No throat pain   NECK: No pain or stiffness  RESPIRATORY: No cough, wheezing; No shortness of breath  CARDIOVASCULAR: No chest pain or palpitations  GASTROINTESTINAL: No abdominal  pain. No nausea, vomiting, or hematemesis  GENITOURINARY: No dysuria, frequency or hematuria  NEUROLOGICAL: No stroke like symptoms  SKIN: No rashes    	  MEDICATIONS:  losartan 100 milliGRAM(s) Oral daily  metoprolol succinate ER 50 milliGRAM(s) Oral daily        PHYSICAL EXAM:  T(C): 36.6 (11-24-24 @ 11:07), Max: 36.8 (11-23-24 @ 20:20)  HR: 60 (11-24-24 @ 11:07) (60 - 77)  BP: 149/77 (11-24-24 @ 11:07) (149/77 - 188/76)  RR: 18 (11-24-24 @ 11:07) (18 - 18)  SpO2: 97% (11-24-24 @ 11:07) (97% - 99%)  Wt(kg): --  I&O's Summary    23 Nov 2024 07:01  -  24 Nov 2024 07:00  --------------------------------------------------------  IN: 426 mL / OUT: 0 mL / NET: 426 mL          Appearance: In no distress	  HEENT:    PERRL, EOMI	  Cardiovascular:  S1 S2, No JVD  Respiratory: Lungs clear to auscultation	  Gastrointestinal:  Soft, Non-tender, + BS	  Vascularature:  No edema of LE  Psychiatric: Appropriate affect   Neuro: no acute focal deficits                               12.4   6.35  )-----------( 176      ( 24 Nov 2024 06:55 )             35.7     11-24    142  |  106  |  21  ----------------------------<  128[H]  3.6   |  23  |  0.91    Ca    8.6      24 Nov 2024 06:54  Phos  2.4     11-24  Mg     2.2     11-24          Labs personally reviewed      ASSESSMENT/PLAN: 	        79M c hx CAD s/p ?1vCABG (15 yrs ago @ [facility]) c/b chronic b/l LE edema of unclear etiol, osteomyelitis s/p left 2nd toe partial amputation, denies HTN, denies DM2 (A1c 6.1 Nov '19, A1c 9.1 May '19), pw 1 day of left 4th toe redness, pain, swelling.    Problem/Plan #1:  Problem: Cardiac Risk Stratification  - Reports excellent functional capacity  - No tachy malgorzata arrhythmia  - No severe valvular dysfunction; TTE 2023 at [facility] unremarkable   - EKG NSR RBBB  - Ok to proceed with podiatric surgery, no cardiac contraindication    Problem/Plan #2:  Problem: CAD  - s/p CABG   - c/w ASA (Repatha as OP)  - Cath 2023 @ [facility] with LIMA graft to mLAD patent, SVG to RPDA patent with occlusion distal to graft, severe native LCx disease not grafted     Problem/Plan #3:  Problem: Hypertension  - BP elevated. Will increase losartan to 100mg PO daily  - c/w metoprolol 25mg PO daily    Problem/Plan #4:  Problem: LE Edema  - resolved  - TTE EF 62%, no rwma      Yesenia Cavanaugh, AG-NP   Rome Harmon DO [facility]  Cardiovascular Medicine  [address] [place] [address], [address]  Available through call or text on [employer] TE[employer]  Office: [phone]   DATE OF SERVICE: 11-24-24 @ 12:15    Patient is a 79y old  Male who presents with a chief complaint of left 4th toe redness, pain, swelling (24 Nov 2024 09:42)      INTERVAL HISTORY: Feels ok.     REVIEW OF SYSTEMS:  CONSTITUTIONAL: No weakness  EYES/ENT: No visual changes;  No throat pain   NECK: No pain or stiffness  RESPIRATORY: No cough, wheezing; No shortness of breath  CARDIOVASCULAR: No chest pain or palpitations  GASTROINTESTINAL: No abdominal  pain. No nausea, vomiting, or hematemesis  GENITOURINARY: No dysuria, frequency or hematuria  NEUROLOGICAL: No stroke like symptoms  SKIN: No rashes    	  MEDICATIONS:  losartan 100 milliGRAM(s) Oral daily  metoprolol succinate ER 50 milliGRAM(s) Oral daily        PHYSICAL EXAM:  T(C): 36.6 (11-24-24 @ 11:07), Max: 36.8 (11-23-24 @ 20:20)  HR: 60 (11-24-24 @ 11:07) (60 - 77)  BP: 149/77 (11-24-24 @ 11:07) (149/77 - 188/76)  RR: 18 (11-24-24 @ 11:07) (18 - 18)  SpO2: 97% (11-24-24 @ 11:07) (97% - 99%)  Wt(kg): --  I&O's Summary    23 Nov 2024 07:01  -  24 Nov 2024 07:00  --------------------------------------------------------  IN: 426 mL / OUT: 0 mL / NET: 426 mL          Appearance: In no distress	  HEENT:    PERRL, EOMI	  Cardiovascular:  S1 S2, No JVD  Respiratory: Lungs clear to auscultation	  Gastrointestinal:  Soft, Non-tender, + BS	  Vascularature:  No edema of LE  Psychiatric: Appropriate affect   Neuro: no acute focal deficits                               12.4   6.35  )-----------( 176      ( 24 Nov 2024 06:55 )             35.7     11-24    142  |  106  |  21  ----------------------------<  128[H]  3.6   |  23  |  0.91    Ca    8.6      24 Nov 2024 06:54  Phos  2.4     11-24  Mg     2.2     11-24          Labs personally reviewed      ASSESSMENT/PLAN: 	        79M c hx CAD s/p ?1vCABG (15 yrs ago @ [facility]) c/b chronic b/l LE edema of unclear etiol, osteomyelitis s/p left 2nd toe partial amputation, denies HTN, denies DM2 (A1c 6.1 Nov '19, A1c 9.1 May '19), pw 1 day of left 4th toe redness, pain, swelling.    Problem/Plan #1:  Problem: Cardiac Risk Stratification  - Reports excellent functional capacity  - No tachy malgorzata arrhythmia  - No severe valvular dysfunction; TTE 2023 at [facility] unremarkable   - EKG NSR RBBB  - Ok to proceed with podiatric surgery, no cardiac contraindication    Problem/Plan #2:  Problem: CAD  - s/p CABG   - c/w ASA (Repatha as OP)  - Cath 2023 @ [facility] with LIMA graft to mLAD patent, SVG to RPDA patent with occlusion distal to graft, severe native LCx disease not grafted     Problem/Plan #3:  Problem: Hypertension  - BP elevated. Losartan increased to 100mg PO daily  - c/w metoprolol 25mg PO daily    Problem/Plan #4:  Problem: LE Edema  - resolved  - TTE EF 62%, no rwma      Yesenia Cavanaugh, AG-NP   Rome Harmon DO [facility]  Cardiovascular Medicine  [address] [place] [address], [address]  Available through call or text on [employer] TEAMs  Office: [phone]   DATE OF SERVICE: 11-24-24 @ 12:15    Patient is a 79y old  Male who presents with a chief complaint of left 4th toe redness, pain, swelling (24 Nov 2024 09:42)      INTERVAL HISTORY: Feels ok.     REVIEW OF SYSTEMS:  CONSTITUTIONAL: No weakness  EYES/ENT: No visual changes;  No throat pain   NECK: No pain or stiffness  RESPIRATORY: No cough, wheezing; No shortness of breath  CARDIOVASCULAR: No chest pain or palpitations  GASTROINTESTINAL: No abdominal  pain. No nausea, vomiting, or hematemesis  GENITOURINARY: No dysuria, frequency or hematuria  NEUROLOGICAL: No stroke like symptoms  SKIN: No rashes    	  MEDICATIONS:  losartan 100 milliGRAM(s) Oral daily  metoprolol succinate ER 50 milliGRAM(s) Oral daily        PHYSICAL EXAM:  T(C): 36.6 (11-24-24 @ 11:07), Max: 36.8 (11-23-24 @ 20:20)  HR: 60 (11-24-24 @ 11:07) (60 - 77)  BP: 149/77 (11-24-24 @ 11:07) (149/77 - 188/76)  RR: 18 (11-24-24 @ 11:07) (18 - 18)  SpO2: 97% (11-24-24 @ 11:07) (97% - 99%)  Wt(kg): --  I&O's Summary    23 Nov 2024 07:01  -  24 Nov 2024 07:00  --------------------------------------------------------  IN: 426 mL / OUT: 0 mL / NET: 426 mL          Appearance: In no distress	  HEENT:    PERRL, EOMI	  Cardiovascular:  S1 S2, No JVD  Respiratory: Lungs clear to auscultation	  Gastrointestinal:  Soft, Non-tender, + BS	  Vascularature:  No edema of LE  Psychiatric: Appropriate affect   Neuro: no acute focal deficits                               12.4   6.35  )-----------( 176      ( 24 Nov 2024 06:55 )             35.7     11-24    142  |  106  |  21  ----------------------------<  128[H]  3.6   |  23  |  0.91    Ca    8.6      24 Nov 2024 06:54  Phos  2.4     11-24  Mg     2.2     11-24          Labs personally reviewed      ASSESSMENT/PLAN: 	        79M c hx CAD s/p ?1vCABG (15 yrs ago @ [facility]) c/b chronic b/l LE edema of unclear etiol, osteomyelitis s/p left 2nd toe partial amputation, denies HTN, denies DM2 (A1c 6.1 Nov '19, A1c 9.1 May '19), pw 1 day of left 4th toe redness, pain, swelling.    Problem/Plan #1:  Problem: Cardiac Risk Stratification  - Reports excellent functional capacity  - No tachy malgorzata arrhythmia  - No severe valvular dysfunction; TTE 2023 at [facility] unremarkable   - EKG NSR RBBB  - Ok to proceed with podiatric surgery, no cardiac contraindication    Problem/Plan #2:  Problem: CAD  - s/p CABG   - c/w ASA (Repatha as OP)  - Cath 2023 @ [facility] with LIMA graft to mLAD patent, SVG to RPDA patent with occlusion distal to graft, severe native LCx disease not grafted     Problem/Plan #3:  Problem: Hypertension  - BP elevated. Increase Losartan to 100mg PO daily  - c/w metoprolol 25mg PO daily    Problem/Plan #4:  Problem: LE Edema  - resolved  - TTE EF 62%, no rwma      Yesenia Cavanaugh, AG-NP   Rome Harmon DO [facility]  Cardiovascular Medicine  [address] [place] [address], [address]  Available through call or text on [employer] TEAMs  Office: [phone]

## 2024-11-24 NOTE — PROGRESS NOTE ADULT - PROBLEM SELECTOR PLAN 5
- fs controlled  - fs achs  - c/w sliding scale insulin  - hba1c 6.8

## 2024-11-24 NOTE — PROGRESS NOTE ADULT - NSPROGADDITIONALINFOA_GEN_ALL_CORE
disposition: pending OR cultures-- plan to transition to PO, likey 11/25- no PT needs   discussed with acp    Cammy Hoffman D.O.  Division of Hospital Medicine  Available on MS Teams

## 2024-11-24 NOTE — PROGRESS NOTE ADULT - PROBLEM SELECTOR PLAN 2
- LE duplex negative for dvt
- LE duplex negative for dvt
- LE duplex pending
- resolved   - LE duplex negative for dvt
- resolved   - LE duplex negative for dvt
unknown, patient confuse

## 2024-11-24 NOTE — PROGRESS NOTE ADULT - PROBLEM SELECTOR PLAN 3
- c/w asa
PAST SURGICAL HISTORY:  No significant past surgical history     
- c/w asa

## 2024-11-25 ENCOUNTER — TRANSCRIPTION ENCOUNTER (OUTPATIENT)
Age: 79
End: 2024-11-25

## 2024-11-25 VITALS — SYSTOLIC BLOOD PRESSURE: 189 MMHG | DIASTOLIC BLOOD PRESSURE: 79 MMHG

## 2024-11-25 LAB
CULTURE RESULTS: ABNORMAL
CULTURE RESULTS: SIGNIFICANT CHANGE UP
CULTURE RESULTS: SIGNIFICANT CHANGE UP
HCT VFR BLD CALC: 35.5 % — LOW (ref 39–50)
HGB BLD-MCNC: 12.4 G/DL — LOW (ref 13–17)
MCHC RBC-ENTMCNC: 32 PG — SIGNIFICANT CHANGE UP (ref 27–34)
MCHC RBC-ENTMCNC: 34.9 G/DL — SIGNIFICANT CHANGE UP (ref 32–36)
MCV RBC AUTO: 91.5 FL — SIGNIFICANT CHANGE UP (ref 80–100)
NRBC # BLD: 0 /100 WBCS — SIGNIFICANT CHANGE UP (ref 0–0)
ORGANISM # SPEC MICROSCOPIC CNT: ABNORMAL
PHOSPHATE SERPL-MCNC: 2.1 MG/DL — LOW (ref 2.5–4.5)
PLATELET # BLD AUTO: 182 K/UL — SIGNIFICANT CHANGE UP (ref 150–400)
RBC # BLD: 3.88 M/UL — LOW (ref 4.2–5.8)
RBC # FLD: 13 % — SIGNIFICANT CHANGE UP (ref 10.3–14.5)
SPECIMEN SOURCE: SIGNIFICANT CHANGE UP
WBC # BLD: 7.2 K/UL — SIGNIFICANT CHANGE UP (ref 3.8–10.5)
WBC # FLD AUTO: 7.2 K/UL — SIGNIFICANT CHANGE UP (ref 3.8–10.5)

## 2024-11-25 PROCEDURE — 86901 BLOOD TYPING SEROLOGIC RH(D): CPT

## 2024-11-25 PROCEDURE — 87206 SMEAR FLUORESCENT/ACID STAI: CPT

## 2024-11-25 PROCEDURE — 93970 EXTREMITY STUDY: CPT

## 2024-11-25 PROCEDURE — 85610 PROTHROMBIN TIME: CPT

## 2024-11-25 PROCEDURE — 88312 SPECIAL STAINS GROUP 1: CPT

## 2024-11-25 PROCEDURE — 93356 MYOCRD STRAIN IMG SPCKL TRCK: CPT

## 2024-11-25 PROCEDURE — 99285 EMERGENCY DEPT VISIT HI MDM: CPT

## 2024-11-25 PROCEDURE — 86140 C-REACTIVE PROTEIN: CPT

## 2024-11-25 PROCEDURE — 83735 ASSAY OF MAGNESIUM: CPT

## 2024-11-25 PROCEDURE — 87075 CULTR BACTERIA EXCEPT BLOOD: CPT

## 2024-11-25 PROCEDURE — 87040 BLOOD CULTURE FOR BACTERIA: CPT

## 2024-11-25 PROCEDURE — 88305 TISSUE EXAM BY PATHOLOGIST: CPT

## 2024-11-25 PROCEDURE — 36415 COLL VENOUS BLD VENIPUNCTURE: CPT

## 2024-11-25 PROCEDURE — A9585: CPT

## 2024-11-25 PROCEDURE — 87077 CULTURE AEROBIC IDENTIFY: CPT

## 2024-11-25 PROCEDURE — 87070 CULTURE OTHR SPECIMN AEROBIC: CPT

## 2024-11-25 PROCEDURE — 87205 SMEAR GRAM STAIN: CPT

## 2024-11-25 PROCEDURE — 84145 PROCALCITONIN (PCT): CPT

## 2024-11-25 PROCEDURE — 99239 HOSP IP/OBS DSCHRG MGMT >30: CPT

## 2024-11-25 PROCEDURE — 99232 SBSQ HOSP IP/OBS MODERATE 35: CPT

## 2024-11-25 PROCEDURE — 93306 TTE W/DOPPLER COMPLETE: CPT

## 2024-11-25 PROCEDURE — 80053 COMPREHEN METABOLIC PANEL: CPT

## 2024-11-25 PROCEDURE — 85652 RBC SED RATE AUTOMATED: CPT

## 2024-11-25 PROCEDURE — 96374 THER/PROPH/DIAG INJ IV PUSH: CPT

## 2024-11-25 PROCEDURE — 85730 THROMBOPLASTIN TIME PARTIAL: CPT

## 2024-11-25 PROCEDURE — 87015 SPECIMEN INFECT AGNT CONCNTJ: CPT

## 2024-11-25 PROCEDURE — 71045 X-RAY EXAM CHEST 1 VIEW: CPT

## 2024-11-25 PROCEDURE — 88311 DECALCIFY TISSUE: CPT

## 2024-11-25 PROCEDURE — 80202 ASSAY OF VANCOMYCIN: CPT

## 2024-11-25 PROCEDURE — 83036 HEMOGLOBIN GLYCOSYLATED A1C: CPT

## 2024-11-25 PROCEDURE — 85025 COMPLETE CBC W/AUTO DIFF WBC: CPT

## 2024-11-25 PROCEDURE — 97161 PT EVAL LOW COMPLEX 20 MIN: CPT

## 2024-11-25 PROCEDURE — 86850 RBC ANTIBODY SCREEN: CPT

## 2024-11-25 PROCEDURE — 93923 UPR/LXTR ART STDY 3+ LVLS: CPT

## 2024-11-25 PROCEDURE — 87102 FUNGUS ISOLATION CULTURE: CPT

## 2024-11-25 PROCEDURE — 84100 ASSAY OF PHOSPHORUS: CPT

## 2024-11-25 PROCEDURE — 87186 SC STD MICRODIL/AGAR DIL: CPT

## 2024-11-25 PROCEDURE — 97164 PT RE-EVAL EST PLAN CARE: CPT

## 2024-11-25 PROCEDURE — 87116 MYCOBACTERIA CULTURE: CPT

## 2024-11-25 PROCEDURE — 86900 BLOOD TYPING SEROLOGIC ABO: CPT

## 2024-11-25 PROCEDURE — 80048 BASIC METABOLIC PNL TOTAL CA: CPT

## 2024-11-25 PROCEDURE — 85027 COMPLETE CBC AUTOMATED: CPT

## 2024-11-25 PROCEDURE — 73720 MRI LWR EXTREMITY W/O&W/DYE: CPT | Mod: MC

## 2024-11-25 PROCEDURE — 73630 X-RAY EXAM OF FOOT: CPT

## 2024-11-25 RX ORDER — METOPROLOL TARTRATE 100 MG/1
1 TABLET, FILM COATED ORAL
Refills: 0 | DISCHARGE

## 2024-11-25 RX ORDER — METOPROLOL TARTRATE 100 MG/1
1 TABLET, FILM COATED ORAL
Qty: 30 | Refills: 0
Start: 2024-11-25 | End: 2024-12-24

## 2024-11-25 RX ORDER — ACETAMINOPHEN 500MG 500 MG/1
2 TABLET, COATED ORAL
Qty: 0 | Refills: 0 | DISCHARGE
Start: 2024-11-25

## 2024-11-25 RX ORDER — AMOXICILLIN/POTASSIUM CLAV 250-125 MG
875 TABLET ORAL
Qty: 6 | Refills: 0
Start: 2024-11-25 | End: 2024-11-27

## 2024-11-25 RX ORDER — LOSARTAN POTASSIUM 100 MG/1
1 TABLET, FILM COATED ORAL
Qty: 30 | Refills: 0
Start: 2024-11-25 | End: 2024-12-24

## 2024-11-25 RX ORDER — AMOXICILLIN/POTASSIUM CLAV 250-125 MG
1 TABLET ORAL
Qty: 14 | Refills: 0
Start: 2024-11-25 | End: 2024-12-01

## 2024-11-25 RX ADMIN — AMPICILLIN AND SULBACTAM 200 GRAM(S): 1; .5 INJECTION, POWDER, FOR SOLUTION INTRAVENOUS at 11:32

## 2024-11-25 RX ADMIN — Medication 81 MILLIGRAM(S): at 11:31

## 2024-11-25 RX ADMIN — METOPROLOL TARTRATE 50 MILLIGRAM(S): 100 TABLET, FILM COATED ORAL at 05:36

## 2024-11-25 RX ADMIN — AMPICILLIN AND SULBACTAM 200 GRAM(S): 1; .5 INJECTION, POWDER, FOR SOLUTION INTRAVENOUS at 05:35

## 2024-11-25 RX ADMIN — LOSARTAN POTASSIUM 100 MILLIGRAM(S): 100 TABLET, FILM COATED ORAL at 05:36

## 2024-11-25 RX ADMIN — ENOXAPARIN SODIUM 40 MILLIGRAM(S): 30 INJECTION SUBCUTANEOUS at 08:25

## 2024-11-25 NOTE — DISCHARGE NOTE NURSING/CASE MANAGEMENT/SOCIAL WORK - NSDCPEFALRISK_GEN_ALL_CORE
For information on Fall & Injury Prevention, visit: [email or web address] OR  [email or web address] OR  https://www.cdc.gov/steadi/patient.html

## 2024-11-25 NOTE — DISCHARGE NOTE NURSING/CASE MANAGEMENT/SOCIAL WORK - NSDCFUADDAPPT_GEN_ALL_CORE_FT
APPTS ARE READY TO BE MADE: [X] YES    Best Family or Patient Contact (if needed):    Additional Information about above appointments (if needed):    1: follow up with your PCP/cardiology to manage your blood pressure.   2:   3:     Other comments or requests:

## 2024-11-25 NOTE — PROGRESS NOTE ADULT - ASSESSMENT
79M presents for left foot 4th digit wound  - Pt was seen and evaluated  - Afebrile, no leukocytosis  - 11/22 s/p left foot partial 4th digit amputation, closed: sutures intact, mild periincision erythema, no dehiscence no underlying hematoma. Right foot no open wounds or signs of infection  - Intraop findings: low concern for residual infection/ viability   - Recommend discharge 14 days of oral antibiotics if negative clean bone margin for soft tissue coverage, defer antibiotic choice to ID   - ID recs, appreciated   - Pod stable for discharge pending final ID recs/ appearance   - Follow up information in discharge note provider   - Discussed with attending

## 2024-11-25 NOTE — PROGRESS NOTE ADULT - PROVIDER SPECIALTY LIST ADULT
Hospitalist
Podiatry
Cardiology
Infectious Disease
Infectious Disease
Podiatry
Hospitalist
Infectious Disease
Hospitalist

## 2024-11-25 NOTE — PROGRESS NOTE ADULT - ASSESSMENT
79 m with DM, HTN, CAD s/p CABG, previous toe osteomyelitis s/p 2nd toe partial amp and 6 week course of cefepime 2019 ( pseudomonas infection) now p/w left 4th toe redness, pain, swelling.  afebrile, no WBC  exam showed L  plantar-distal 4th digit wound to bone, dactylitis and erythema globally, blistering noted dorsally, 1 cc purulent drainage  xray showed Cortical osseous destruction of the distal fourth phalanx, consistent with osteomyelitis.  wound cx with GPC in pairs    diabetic foot infection with 4th plantar distal wound to bone and purulence, cx with GPC in pairs, cellulitis and dactylitis  xray with distal 4th toe osteomyelitis  MRI: sequela of osteomyelitis at the fourth toe distal phalanx in the setting of ulceration. No abscess.  initial wound cx staph lugdunensis s/p deep I&D and partial 4th amp 11/22, no suspicion for residual osteo, OR cx negative    * c/w unasyn 3 q 6, while in the hospital, now day 4 post op  * will complete a 7 day course post op  * on discharge switch to augmentin 875 bid to finish the course  * follow with podiatry      The above assessment and plan was discussed with the primary team    Uma Ureña MD  contact on teams  After 5pm and on weekends call [phone]

## 2024-11-25 NOTE — PROGRESS NOTE ADULT - REASON FOR ADMISSION
left 4th toe redness, pain, swelling

## 2024-11-25 NOTE — PROGRESS NOTE ADULT - NS ATTEND AMEND GEN_ALL_CORE FT
Patient care and plan discussed and reviewed with Advanced Care Provider. Plan as outlined above edited by me to reflect our discussion. Thirty five minutes spent on encounter, of which more than fifty percent of the encounter was spent on counseling and/or coordinating care by the attending physician.

## 2024-11-25 NOTE — PROGRESS NOTE ADULT - SUBJECTIVE AND OBJECTIVE BOX
Patient is a 79y old  Male who presents with a chief complaint of left 4th toe redness, pain, swelling (24 Nov 2024 12:15)       INTERVAL HPI/OVERNIGHT EVENTS:  Patient seen and evaluated at bedside.  Pt is resting comfortable in NAD. Denies N/V/F/C.    Allergies    No Known Allergies    Intolerances        Vital Signs Last 24 Hrs  T(C): 36.6 (25 Nov 2024 08:21), Max: 36.8 (24 Nov 2024 16:17)  T(F): 97.9 (25 Nov 2024 08:21), Max: 98.2 (24 Nov 2024 16:17)  HR: 58 (25 Nov 2024 08:21) (58 - 63)  BP: 177/78 (25 Nov 2024 08:21) (149/77 - 183/79)  BP(mean): --  RR: 18 (25 Nov 2024 08:21) (18 - 18)  SpO2: 97% (25 Nov 2024 08:21) (97% - 99%)    Parameters below as of 25 Nov 2024 08:21  Patient On (Oxygen Delivery Method): room air        LABS:                        12.4   7.20  )-----------( 182      ( 25 Nov 2024 07:31 )             35.5     11-24    142  |  106  |  21  ----------------------------<  128[H]  3.6   |  23  |  0.91    Ca    8.6      24 Nov 2024 06:54  Phos  2.1     11-25  Mg     2.2     11-24        Urinalysis Basic - ( 24 Nov 2024 06:54 )    Color: x / Appearance: x / SG: x / pH: x  Gluc: 128 mg/dL / Ketone: x  / Bili: x / Urobili: x   Blood: x / Protein: x / Nitrite: x   Leuk Esterase: x / RBC: x / WBC x   Sq Epi: x / Non Sq Epi: x / Bacteria: x      CAPILLARY BLOOD GLUCOSE          Lower Extremity Physical Exam:  Vascular: DP/PT 2/4, B/L, CFT <3 seconds B/L, Temperature gradient warm to cool, B/L.   Neuro: Epicritic sensation diminished to the level of digits, B/L.  Musculoskeletal/Ortho: 2019 s/p bilateral 2nd digit amputations  Skin: 11/22 s/p left foot partial 4th digit amputation, closed: sutures intact, mild periincision erythema, no dehiscence no underlying hematoma. Right foot no open wounds or signs of infection    RADIOLOGY & ADDITIONAL TESTS:

## 2024-11-25 NOTE — PROGRESS NOTE ADULT - SUBJECTIVE AND OBJECTIVE BOX
Follow Up:  diabetic foot infection and osteo    Interval History/ROS: pt afebrile, no cough or vomiting, s/p I&D 11/22 with no suspicion for residual bone infection and OR cx negative        Allergies  No Known Allergies        ANTIMICROBIALS:  ampicillin/sulbactam  IVPB    ampicillin/sulbactam  IVPB 3 every 6 hours      OTHER MEDS:  acetaminophen     Tablet .. 650 milliGRAM(s) Oral every 6 hours PRN  aspirin enteric coated 81 milliGRAM(s) Oral daily  enoxaparin Injectable 40 milliGRAM(s) SubCutaneous every 24 hours  HYDROmorphone  Injectable 0.5 milliGRAM(s) IV Push every 4 hours PRN  losartan 100 milliGRAM(s) Oral daily  melatonin 3 milliGRAM(s) Oral at bedtime PRN  metoprolol succinate ER 50 milliGRAM(s) Oral daily  oxycodone    5 mG/acetaminophen 325 mG 1 Tablet(s) Oral every 4 hours PRN      Vital Signs Last 24 Hrs  T(C): 36.6 (25 Nov 2024 08:21), Max: 36.8 (24 Nov 2024 16:17)  T(F): 97.9 (25 Nov 2024 08:21), Max: 98.2 (24 Nov 2024 16:17)  HR: 58 (25 Nov 2024 08:21) (58 - 63)  BP: 177/78 (25 Nov 2024 08:21) (149/77 - 183/79)  BP(mean): --  RR: 18 (25 Nov 2024 08:21) (18 - 18)  SpO2: 97% (25 Nov 2024 08:21) (97% - 99%)    Parameters below as of 25 Nov 2024 08:21  Patient On (Oxygen Delivery Method): room air        Physical Exam:  General:    NAD, non toxic  Respiratory:   comfortable on RA  abd:   soft,  not tender  :     no eduardo  Musculoskeletal :  s/p L 2nd toe amp, 4th toe I&D with dressing  Skin:    no rash  vascular: no phlebitis                          12.4   7.20  )-----------( 182      ( 25 Nov 2024 07:31 )             35.5       11-24    142  |  106  |  21  ----------------------------<  128[H]  3.6   |  23  |  0.91    Ca    8.6      24 Nov 2024 06:54  Phos  2.1     11-25  Mg     2.2     11-24        Urinalysis Basic - ( 24 Nov 2024 06:54 )    Color: x / Appearance: x / SG: x / pH: x  Gluc: 128 mg/dL / Ketone: x  / Bili: x / Urobili: x   Blood: x / Protein: x / Nitrite: x   Leuk Esterase: x / RBC: x / WBC x   Sq Epi: x / Non Sq Epi: x / Bacteria: x        MICROBIOLOGY:  v  Tissue  11-22-24   No growth to date.  --    No polymorphonuclear leukocytes seen per low power field  No organisms seen per oil power field      .Abscess  11-19-24   Few Staphylococcus lugdunensis  Rare Staphylococcus epidermidis  --  Staphylococcus lugdunensis  Staphylococcus epidermidis      .Blood BLOOD  11-19-24   No growth at 5 days  --  --      .Blood BLOOD  11-19-24   No growth at 5 days  --  --                RADIOLOGY:  Images independently visualized and reviewed personally, findings as below  < from: Xray Foot AP + Lateral + Oblique, Left (11.22.24 @ 11:53) >    IMPRESSION: Status post amputation of the distal aspect of the proximal   second phalanx as well as at the base of the fourth middle phalanx.   Moderate first metatarsophalangeal joint osteoarthritis. Vascular   calcifications are present. Soft tissue edema is seen surrounding fourth   digit possibly postsurgical versus secondary to ulcer. Clinical   correlation is advised.    < end of copied text >  < from: MR Foot w/wo IV Cont, Left (11.20.24 @ 16:50) >  IMPRESSION:    MRI of the left forefoot demonstrate sequela of osteomyelitis at the   fourth toe distal phalanx in the setting of ulceration. No abscess.      < end of copied text >

## 2024-11-25 NOTE — DISCHARGE NOTE NURSING/CASE MANAGEMENT/SOCIAL WORK - PATIENT PORTAL LINK FT
You can access the FollowMyHealth Patient Portal offered by [facility] by registering at the following website: [email or web address]. By joining [employer]’s FollowMyHealth portal, you will also be able to view your health information using other applications (apps) compatible with our system.

## 2024-11-25 NOTE — DISCHARGE NOTE NURSING/CASE MANAGEMENT/SOCIAL WORK - FINANCIAL ASSISTANCE
[facility] provides services at a reduced cost to those who are determined to be eligible through [facility]’s financial assistance program. Information regarding [facility]’s financial assistance program can be found by going to [email or web address] or by calling [phone].

## 2024-11-25 NOTE — CHART NOTE - NSCHARTNOTEFT_GEN_A_CORE
Pt stable for discharge as discussed with podiatry and ID. See d/c note for further details.    ID recommended continuing augmentin for 3 additional days; podiatry requested 7 additional days. Will discharge with 7 additional days of augmentin. Pt stable for discharge as discussed with podiatry and ID. See d/c note for further details.    ID recommended continuing augmentin for 3 additional days; podiatry requested 7 additional days. Will discharge with 7 additional days of augmentin.    Pt hypertensive but asymptomatic on discharge. Losartan increased yesterday so his BP may not be reflecting new dose yet. Pt wants to go home, will follow-up with cardiologist within a week for further titration of BP meds. Risks of untreated HTN discussed with patient; patient accepts risks.

## 2024-12-04 NOTE — CHART NOTE - NSCHARTNOTEFT_GEN_A_CORE
Patient was outreached but did not answer. A voicemail was left for the patient to return our call. [employer]

## 2024-12-09 ENCOUNTER — APPOINTMENT (OUTPATIENT)
Dept: ORTHOPEDIC SURGERY | Facility: CLINIC | Age: 79
End: 2024-12-09

## 2024-12-10 LAB — SURGICAL PATHOLOGY STUDY: SIGNIFICANT CHANGE UP

## 2025-02-10 NOTE — PHYSICAL THERAPY INITIAL EVALUATION ADULT - HEALTH SCREEN CRITERIA
Alert-The patient is alert, awake and responds to voice. The patient is oriented to time, place, and person. The triage nurse is able to obtain subjective information.
yes
yes

## (undated) DEVICE — FOLEY TRAY 16FR 5CC LTX UMETER CLOSED

## (undated) DEVICE — POSITIONER FOAM EGG CRATE ULNAR 2PCS (PINK)

## (undated) DEVICE — STAPLER SKIN VISI-STAT 35 WIDE

## (undated) DEVICE — PACKING GAUZE PLAIN 0.5"

## (undated) DEVICE — NDL HYPO REGULAR BEVEL 25G X 1.5" (BLUE)

## (undated) DEVICE — SAW BLADE MICROAIRE SAGITTAL 5.8X25.4X0.6 MM

## (undated) DEVICE — DRSG STERISTRIPS 0.5 X 4"

## (undated) DEVICE — DRSG ADAPTIC CURITY OIL EMULSION 3 X 8"

## (undated) DEVICE — WARMING BLANKET UPPER ADULT

## (undated) DEVICE — BLADE SCALPEL SAFETYLOCK #15

## (undated) DEVICE — PACKING GAUZE PLAIN 2"

## (undated) DEVICE — DRAPE ISOLATION BAG 20X20"

## (undated) DEVICE — DRAPE MAGNETIC INSTRUMENT MEDIUM

## (undated) DEVICE — BUR STRYKER OVAL SOLID CARBIDE MED 4MM

## (undated) DEVICE — STRYKER PULSE LAVAGE WITH HIGH FLOW TIP

## (undated) DEVICE — DRAPE TOWEL BLUE 17" X 24"

## (undated) DEVICE — SAW BLADE MICROAIRE OSCILATING 25.4MM X 90MM X 1.27MM

## (undated) DEVICE — PACK EXTREMITY

## (undated) DEVICE — DRAPE INSTRUMENT POUCH 6.75" X 11"

## (undated) DEVICE — DRSG STOCKINETTE IMPERVIOUS XL 12 X 48"

## (undated) DEVICE — SAW BLADE MICROAIRE SAGITTAL 9.4MMX25.4MMX0.6MM

## (undated) DEVICE — DRAPE 3/4 SHEET W REINFORCEMENT 56X77"

## (undated) DEVICE — SUT PLAIN GUT 4-0 18" P-12

## (undated) DEVICE — DRSG XEROFORM 1 X 8"

## (undated) DEVICE — MEDICATION LABELS W MARKER

## (undated) DEVICE — MARKING PEN W RULER

## (undated) DEVICE — SOL IRR POUR NS 0.9% 500ML

## (undated) DEVICE — LAP PAD 18 X 18"

## (undated) DEVICE — DRSG COMBINE 5 X 9"

## (undated) DEVICE — SUT POLYSORB 2-0 30" GS-21 UNDYED

## (undated) DEVICE — DRSG ACE BANDAGE 6"

## (undated) DEVICE — SPECIMEN CONTAINER 100ML

## (undated) DEVICE — DRAPE LIGHT HANDLE COVER (BLUE)

## (undated) DEVICE — DRSG STOCKINETTE IMPERVIOUS MED 8 X 38"

## (undated) DEVICE — PREP BETADINE KIT

## (undated) DEVICE — SOL IRR POUR H2O 250ML

## (undated) DEVICE — DRAPE 1/2 SHEET 40X57"

## (undated) DEVICE — PACKING GAUZE IODOFORM 2"

## (undated) DEVICE — DRSG KLING 4"

## (undated) DEVICE — SYR LUER LOK 10CC

## (undated) DEVICE — VENODYNE/SCD SLEEVE CALF MEDIUM

## (undated) DEVICE — GLV 8 PROTEXIS (WHITE)